# Patient Record
Sex: MALE | Race: BLACK OR AFRICAN AMERICAN | NOT HISPANIC OR LATINO | Employment: OTHER | URBAN - METROPOLITAN AREA
[De-identification: names, ages, dates, MRNs, and addresses within clinical notes are randomized per-mention and may not be internally consistent; named-entity substitution may affect disease eponyms.]

---

## 2024-03-26 ENCOUNTER — HOSPITAL ENCOUNTER (INPATIENT)
Facility: HOSPITAL | Age: 74
LOS: 35 days | Discharge: NURSING, REHAB OR LONG-TERM CARE | DRG: 955 | End: 2024-04-30
Attending: STUDENT IN AN ORGANIZED HEALTH CARE EDUCATION/TRAINING PROGRAM | Admitting: SURGERY
Payer: MEDICARE

## 2024-03-26 DIAGNOSIS — R60.9 SWELLING: ICD-10-CM

## 2024-03-26 DIAGNOSIS — S06.5XAA SUBDURAL HEMATOMA: ICD-10-CM

## 2024-03-26 DIAGNOSIS — S42.001A CLOSED DISPLACED FRACTURE OF RIGHT CLAVICLE, UNSPECIFIED PART OF CLAVICLE, INITIAL ENCOUNTER: ICD-10-CM

## 2024-03-26 DIAGNOSIS — S32.009A CLOSED FRACTURE OF TRANSVERSE PROCESS OF LUMBAR VERTEBRA, INITIAL ENCOUNTER: ICD-10-CM

## 2024-03-26 DIAGNOSIS — R00.0 TACHYCARDIA: ICD-10-CM

## 2024-03-26 DIAGNOSIS — E27.49 ADRENAL HEMORRHAGE: ICD-10-CM

## 2024-03-26 DIAGNOSIS — R79.89 ELEVATED TROPONIN: ICD-10-CM

## 2024-03-26 DIAGNOSIS — I62.00 SUBDURAL BLEEDING: ICD-10-CM

## 2024-03-26 DIAGNOSIS — S42.017A CLOSED NONDISPLACED FRACTURE OF STERNAL END OF RIGHT CLAVICLE, INITIAL ENCOUNTER: Primary | ICD-10-CM

## 2024-03-26 DIAGNOSIS — S22.41XA CLOSED FRACTURE OF MULTIPLE RIBS OF RIGHT SIDE, INITIAL ENCOUNTER: ICD-10-CM

## 2024-03-26 DIAGNOSIS — I10 HTN (HYPERTENSION): ICD-10-CM

## 2024-03-26 DIAGNOSIS — I60.9 SAH (SUBARACHNOID HEMORRHAGE): ICD-10-CM

## 2024-03-26 DIAGNOSIS — S06.5X0A TRAUMATIC SUBDURAL HEMATOMA WITHOUT LOSS OF CONSCIOUSNESS, INITIAL ENCOUNTER: ICD-10-CM

## 2024-03-26 DIAGNOSIS — S06.9XAA TBI (TRAUMATIC BRAIN INJURY): ICD-10-CM

## 2024-03-26 DIAGNOSIS — J93.9 PNEUMOTHORAX, UNSPECIFIED TYPE: ICD-10-CM

## 2024-03-26 LAB
ALBUMIN SERPL-MCNC: 3.9 G/DL (ref 3.4–4.8)
ALBUMIN/GLOB SERPL: 1.2 RATIO (ref 1.1–2)
ALP SERPL-CCNC: 65 UNIT/L (ref 40–150)
ALT SERPL-CCNC: 98 UNIT/L (ref 0–55)
AMPHET UR QL SCN: NEGATIVE
APPEARANCE UR: CLEAR
APTT PPP: 27.5 SECONDS (ref 23.2–33.7)
AST SERPL-CCNC: 163 UNIT/L (ref 5–34)
BACTERIA #/AREA URNS AUTO: ABNORMAL /HPF
BARBITURATE SCN PRESENT UR: NEGATIVE
BASOPHILS # BLD AUTO: 0.08 X10(3)/MCL
BASOPHILS NFR BLD AUTO: 0.5 %
BENZODIAZ UR QL SCN: NEGATIVE
BILIRUB SERPL-MCNC: 0.4 MG/DL
BILIRUB UR QL STRIP.AUTO: NEGATIVE
BUN SERPL-MCNC: 21.2 MG/DL (ref 8.4–25.7)
CALCIUM SERPL-MCNC: 9.2 MG/DL (ref 8.8–10)
CANNABINOIDS UR QL SCN: NEGATIVE
CHLORIDE SERPL-SCNC: 106 MMOL/L (ref 98–107)
CO2 SERPL-SCNC: 21 MMOL/L (ref 23–31)
COCAINE UR QL SCN: NEGATIVE
COLOR UR AUTO: ABNORMAL
CREAT SERPL-MCNC: 0.86 MG/DL (ref 0.73–1.18)
EOSINOPHIL # BLD AUTO: 0.05 X10(3)/MCL (ref 0–0.9)
EOSINOPHIL NFR BLD AUTO: 0.3 %
ERYTHROCYTE [DISTWIDTH] IN BLOOD BY AUTOMATED COUNT: 14.7 % (ref 11.5–17)
ETHANOL SERPL-MCNC: <10 MG/DL
FENTANYL UR QL SCN: POSITIVE
GFR SERPLBLD CREATININE-BSD FMLA CKD-EPI: >60 MLS/MIN/1.73/M2
GLOBULIN SER-MCNC: 3.3 GM/DL (ref 2.4–3.5)
GLUCOSE SERPL-MCNC: 160 MG/DL (ref 82–115)
GLUCOSE UR QL STRIP.AUTO: NORMAL
HCT VFR BLD AUTO: 39.1 % (ref 42–52)
HGB BLD-MCNC: 12.4 G/DL (ref 14–18)
IMM GRANULOCYTES # BLD AUTO: 0.08 X10(3)/MCL (ref 0–0.04)
IMM GRANULOCYTES NFR BLD AUTO: 0.5 %
INR PPP: 1.2
KETONES UR QL STRIP.AUTO: NEGATIVE
LACTATE SERPL-SCNC: 2.7 MMOL/L (ref 0.5–2.2)
LEUKOCYTE ESTERASE UR QL STRIP.AUTO: NEGATIVE
LYMPHOCYTES # BLD AUTO: 3.31 X10(3)/MCL (ref 0.6–4.6)
LYMPHOCYTES NFR BLD AUTO: 21.8 %
MCH RBC QN AUTO: 29.7 PG (ref 27–31)
MCHC RBC AUTO-ENTMCNC: 31.7 G/DL (ref 33–36)
MCV RBC AUTO: 93.8 FL (ref 80–94)
MDMA UR QL SCN: NEGATIVE
MONOCYTES # BLD AUTO: 1.08 X10(3)/MCL (ref 0.1–1.3)
MONOCYTES NFR BLD AUTO: 7.1 %
MUCOUS THREADS URNS QL MICRO: ABNORMAL /LPF
NEUTROPHILS # BLD AUTO: 10.55 X10(3)/MCL (ref 2.1–9.2)
NEUTROPHILS NFR BLD AUTO: 69.8 %
NITRITE UR QL STRIP.AUTO: NEGATIVE
NRBC BLD AUTO-RTO: 0 %
OPIATES UR QL SCN: POSITIVE
PCP UR QL: NEGATIVE
PH UR STRIP.AUTO: 5.5 [PH]
PH UR: 5.5 [PH] (ref 3–11)
PLATELET # BLD AUTO: 236 X10(3)/MCL (ref 130–400)
PMV BLD AUTO: 9.4 FL (ref 7.4–10.4)
POTASSIUM SERPL-SCNC: 3.3 MMOL/L (ref 3.5–5.1)
PROT SERPL-MCNC: 7.2 GM/DL (ref 5.8–7.6)
PROT UR QL STRIP.AUTO: ABNORMAL
PROTHROMBIN TIME: 15.5 SECONDS (ref 12.5–14.5)
RBC # BLD AUTO: 4.17 X10(6)/MCL (ref 4.7–6.1)
RBC #/AREA URNS AUTO: ABNORMAL /HPF
RBC UR QL AUTO: ABNORMAL
SODIUM SERPL-SCNC: 139 MMOL/L (ref 136–145)
SP GR UR STRIP.AUTO: 1.03 (ref 1–1.03)
SPECIFIC GRAVITY, URINE AUTO (.000) (OHS): 1.03 (ref 1–1.03)
SQUAMOUS #/AREA URNS LPF: ABNORMAL /HPF
UROBILINOGEN UR STRIP-ACNC: NORMAL
WBC # SPEC AUTO: 15.15 X10(3)/MCL (ref 4.5–11.5)
WBC #/AREA URNS AUTO: ABNORMAL /HPF

## 2024-03-26 PROCEDURE — 63600175 PHARM REV CODE 636 W HCPCS: Performed by: SURGERY

## 2024-03-26 PROCEDURE — 63600175 PHARM REV CODE 636 W HCPCS

## 2024-03-26 PROCEDURE — 80053 COMPREHEN METABOLIC PANEL: CPT | Performed by: SURGERY

## 2024-03-26 PROCEDURE — 25000003 PHARM REV CODE 250: Performed by: SURGERY

## 2024-03-26 PROCEDURE — 0HQ0XZZ REPAIR SCALP SKIN, EXTERNAL APPROACH: ICD-10-PCS | Performed by: SURGERY

## 2024-03-26 PROCEDURE — G0390 TRAUMA RESPONS W/HOSP CRITI: HCPCS

## 2024-03-26 PROCEDURE — 25500020 PHARM REV CODE 255: Performed by: SURGERY

## 2024-03-26 PROCEDURE — 85610 PROTHROMBIN TIME: CPT | Performed by: SURGERY

## 2024-03-26 PROCEDURE — 25000003 PHARM REV CODE 250: Performed by: NURSE PRACTITIONER

## 2024-03-26 PROCEDURE — 96375 TX/PRO/DX INJ NEW DRUG ADDON: CPT

## 2024-03-26 PROCEDURE — 82077 ASSAY SPEC XCP UR&BREATH IA: CPT | Performed by: SURGERY

## 2024-03-26 PROCEDURE — 81001 URINALYSIS AUTO W/SCOPE: CPT | Mod: XB | Performed by: SURGERY

## 2024-03-26 PROCEDURE — 3E0234Z INTRODUCTION OF SERUM, TOXOID AND VACCINE INTO MUSCLE, PERCUTANEOUS APPROACH: ICD-10-PCS | Performed by: SURGERY

## 2024-03-26 PROCEDURE — 51702 INSERT TEMP BLADDER CATH: CPT

## 2024-03-26 PROCEDURE — 90715 TDAP VACCINE 7 YRS/> IM: CPT | Performed by: SURGERY

## 2024-03-26 PROCEDURE — 90471 IMMUNIZATION ADMIN: CPT | Performed by: SURGERY

## 2024-03-26 PROCEDURE — 85730 THROMBOPLASTIN TIME PARTIAL: CPT | Performed by: SURGERY

## 2024-03-26 PROCEDURE — 63600175 PHARM REV CODE 636 W HCPCS: Performed by: NURSE PRACTITIONER

## 2024-03-26 PROCEDURE — 80307 DRUG TEST PRSMV CHEM ANLYZR: CPT | Performed by: SURGERY

## 2024-03-26 PROCEDURE — 85025 COMPLETE CBC W/AUTO DIFF WBC: CPT | Performed by: SURGERY

## 2024-03-26 PROCEDURE — 93005 ELECTROCARDIOGRAM TRACING: CPT

## 2024-03-26 PROCEDURE — 20800000 HC ICU TRAUMA

## 2024-03-26 PROCEDURE — 86850 RBC ANTIBODY SCREEN: CPT | Performed by: SURGERY

## 2024-03-26 PROCEDURE — 83605 ASSAY OF LACTIC ACID: CPT | Performed by: SURGERY

## 2024-03-26 PROCEDURE — 99285 EMERGENCY DEPT VISIT HI MDM: CPT | Mod: 25

## 2024-03-26 PROCEDURE — 96374 THER/PROPH/DIAG INJ IV PUSH: CPT

## 2024-03-26 PROCEDURE — 25000003 PHARM REV CODE 250

## 2024-03-26 RX ORDER — BISACODYL 10 MG/1
10 SUPPOSITORY RECTAL DAILY PRN
Status: DISCONTINUED | OUTPATIENT
Start: 2024-03-26 | End: 2024-04-30 | Stop reason: HOSPADM

## 2024-03-26 RX ORDER — ACETAMINOPHEN 325 MG/1
650 TABLET ORAL EVERY 4 HOURS
Status: DISPENSED | OUTPATIENT
Start: 2024-03-26 | End: 2024-03-31

## 2024-03-26 RX ORDER — LABETALOL HYDROCHLORIDE 5 MG/ML
10 INJECTION, SOLUTION INTRAVENOUS EVERY 6 HOURS PRN
Status: DISCONTINUED | OUTPATIENT
Start: 2024-03-26 | End: 2024-04-30 | Stop reason: HOSPADM

## 2024-03-26 RX ORDER — POLYETHYLENE GLYCOL 3350 17 G/17G
17 POWDER, FOR SOLUTION ORAL 2 TIMES DAILY
Status: DISCONTINUED | OUTPATIENT
Start: 2024-03-26 | End: 2024-04-01

## 2024-03-26 RX ORDER — LEVETIRACETAM 10 MG/ML
INJECTION INTRAVASCULAR
Status: COMPLETED
Start: 2024-03-26 | End: 2024-03-26

## 2024-03-26 RX ORDER — LEVETIRACETAM 500 MG/1
500 TABLET ORAL 2 TIMES DAILY
Status: DISCONTINUED | OUTPATIENT
Start: 2024-03-27 | End: 2024-03-26

## 2024-03-26 RX ORDER — FAMOTIDINE 10 MG/ML
20 INJECTION INTRAVENOUS 2 TIMES DAILY
Status: DISCONTINUED | OUTPATIENT
Start: 2024-03-27 | End: 2024-04-03

## 2024-03-26 RX ORDER — CEFAZOLIN SODIUM 1 G/3ML
INJECTION, POWDER, FOR SOLUTION INTRAMUSCULAR; INTRAVENOUS
Status: COMPLETED
Start: 2024-03-26 | End: 2024-03-26

## 2024-03-26 RX ORDER — LEVETIRACETAM 10 MG/ML
1000 INJECTION INTRAVASCULAR ONCE
Status: COMPLETED | OUTPATIENT
Start: 2024-03-26 | End: 2024-03-26

## 2024-03-26 RX ORDER — DOCUSATE SODIUM 100 MG/1
100 CAPSULE, LIQUID FILLED ORAL 2 TIMES DAILY
Status: DISCONTINUED | OUTPATIENT
Start: 2024-03-26 | End: 2024-04-01

## 2024-03-26 RX ORDER — MORPHINE SULFATE 4 MG/ML
2 INJECTION, SOLUTION INTRAMUSCULAR; INTRAVENOUS
Status: ACTIVE | OUTPATIENT
Start: 2024-03-26 | End: 2024-03-29

## 2024-03-26 RX ORDER — METHOCARBAMOL 100 MG/ML
500 INJECTION, SOLUTION INTRAMUSCULAR; INTRAVENOUS EVERY 8 HOURS
Status: DISCONTINUED | OUTPATIENT
Start: 2024-03-27 | End: 2024-03-26

## 2024-03-26 RX ORDER — FAMOTIDINE 20 MG/1
20 TABLET, FILM COATED ORAL 2 TIMES DAILY
Status: DISCONTINUED | OUTPATIENT
Start: 2024-03-26 | End: 2024-03-26

## 2024-03-26 RX ORDER — HYDRALAZINE HYDROCHLORIDE 20 MG/ML
10 INJECTION INTRAMUSCULAR; INTRAVENOUS EVERY 6 HOURS PRN
Status: DISCONTINUED | OUTPATIENT
Start: 2024-03-26 | End: 2024-04-30 | Stop reason: HOSPADM

## 2024-03-26 RX ORDER — ONDANSETRON HYDROCHLORIDE 2 MG/ML
INJECTION, SOLUTION INTRAVENOUS CODE/TRAUMA/SEDATION MEDICATION
Status: COMPLETED | OUTPATIENT
Start: 2024-03-26 | End: 2024-03-26

## 2024-03-26 RX ORDER — ONDANSETRON HYDROCHLORIDE 2 MG/ML
INJECTION, SOLUTION INTRAVENOUS
Status: DISPENSED
Start: 2024-03-26 | End: 2024-03-27

## 2024-03-26 RX ORDER — METHOCARBAMOL 500 MG/1
500 TABLET, FILM COATED ORAL EVERY 8 HOURS
Status: DISCONTINUED | OUTPATIENT
Start: 2024-03-26 | End: 2024-03-26

## 2024-03-26 RX ORDER — OXYCODONE HYDROCHLORIDE 5 MG/1
5 TABLET ORAL EVERY 4 HOURS PRN
Status: DISCONTINUED | OUTPATIENT
Start: 2024-03-26 | End: 2024-03-28

## 2024-03-26 RX ORDER — METHOCARBAMOL 100 MG/ML
500 INJECTION, SOLUTION INTRAMUSCULAR; INTRAVENOUS EVERY 8 HOURS
Status: COMPLETED | OUTPATIENT
Start: 2024-03-26 | End: 2024-03-29

## 2024-03-26 RX ORDER — TALC
6 POWDER (GRAM) TOPICAL NIGHTLY PRN
Status: DISCONTINUED | OUTPATIENT
Start: 2024-03-26 | End: 2024-04-01

## 2024-03-26 RX ORDER — MUPIROCIN 20 MG/G
OINTMENT TOPICAL 2 TIMES DAILY
Status: COMPLETED | OUTPATIENT
Start: 2024-03-26 | End: 2024-03-31

## 2024-03-26 RX ORDER — SODIUM CHLORIDE 9 MG/ML
INJECTION, SOLUTION INTRAVENOUS CONTINUOUS
Status: DISCONTINUED | OUTPATIENT
Start: 2024-03-26 | End: 2024-03-27

## 2024-03-26 RX ADMIN — IOPAMIDOL 100 ML: 755 INJECTION, SOLUTION INTRAVENOUS at 10:03

## 2024-03-26 RX ADMIN — LEVETIRACETAM INJECTION 1000 MG: 10 INJECTION INTRAVENOUS at 09:03

## 2024-03-26 RX ADMIN — SODIUM CHLORIDE: 9 INJECTION, SOLUTION INTRAVENOUS at 10:03

## 2024-03-26 RX ADMIN — ONDANSETRON 4 MG: 2 INJECTION INTRAMUSCULAR; INTRAVENOUS at 09:03

## 2024-03-26 RX ADMIN — METHOCARBAMOL 500 MG: 100 INJECTION INTRAMUSCULAR; INTRAVENOUS at 11:03

## 2024-03-26 RX ADMIN — MUPIROCIN: 20 OINTMENT TOPICAL at 10:03

## 2024-03-26 RX ADMIN — TETANUS TOXOID, REDUCED DIPHTHERIA TOXOID AND ACELLULAR PERTUSSIS VACCINE, ADSORBED 0.5 ML: 5; 2.5; 8; 8; 2.5 SUSPENSION INTRAMUSCULAR at 09:03

## 2024-03-26 RX ADMIN — CEFAZOLIN 2 G: 330 INJECTION, POWDER, FOR SOLUTION INTRAMUSCULAR; INTRAVENOUS at 09:03

## 2024-03-26 RX ADMIN — LEVETIRACETAM 1000 MG: 10 INJECTION INTRAVASCULAR at 09:03

## 2024-03-26 RX ADMIN — OXYCODONE HYDROCHLORIDE 5 MG: 5 TABLET ORAL at 11:03

## 2024-03-26 NOTE — LETTER
__________ accompanied their Brother/ Father/ Grandfather to the emergency department on 3/26/2024. They may return to work on 3/29/2024.    If you have any questions or concerns, please don't hesitate to call.    Trauma ICU RN  [phone]

## 2024-03-26 NOTE — LETTER
March 29, 2024                 [facility] [address] [place] [address]  Phone: [phone] March 29, 2024     Patient: Anayeli Taylor   YOB: 1950       To Whom It May Concern:    Anayeli Taylor was admitted to the hospital on 3/26/2024  9:14 PM and is currently still a patient as of 3/29/2024.  If you have any questions or concerns, please don't hesitate to call at [phone].      Sincerely,        Indira Beauchamp, RN  [facility]   ICU

## 2024-03-26 NOTE — LETTER
March 29, 2024                 [facility] [address] [place] [address]  Phone: [phone]   March 29, 2024     Patient: Anayeli Taylor   YOB: 1950       To Whom It May Concern:    Anayeli Taylor was admitted to the hospital on 3/26/2024  9:14 PM and is still currently a patient as of 3/29/2024.  If you have any questions or concerns, please don't hesitate to call at [phone].      Sincerely,        Indira Beauchamp, RN  [facility] [place] [facility]  ICU

## 2024-03-26 NOTE — Clinical Note
Diagnosis: TBI (traumatic brain injury) [996105]   Future Attending Provider: AURORA MARSH [[id number]]   Admit to which facility:: [facility] [[id number]]   Requested Bed Type: Bariatric [4]   Reason for IP Medical Treatment  (Clinical interventions that can only be accomplished in the IP setting? ) :: TBI   I certify that Inpatient services for greater than or equal to 2 midnights are medically necessary:: Yes   Plans for Post-Acute care--if anticipated (pick the single best option):: A. No post acute care anticipated at this time

## 2024-03-27 ENCOUNTER — ANESTHESIA EVENT (OUTPATIENT)
Dept: SURGERY | Facility: HOSPITAL | Age: 74
DRG: 955 | End: 2024-03-27
Payer: MEDICARE

## 2024-03-27 ENCOUNTER — ANESTHESIA (OUTPATIENT)
Dept: SURGERY | Facility: HOSPITAL | Age: 74
DRG: 955 | End: 2024-03-27
Payer: MEDICARE

## 2024-03-27 PROBLEM — S06.5XAA TRAUMATIC SUBDURAL HEMATOMA (SDH): Status: ACTIVE | Noted: 2024-03-27

## 2024-03-27 PROBLEM — S42.017A CLOSED NONDISPLACED FRACTURE OF STERNAL END OF RIGHT CLAVICLE: Status: ACTIVE | Noted: 2024-03-27

## 2024-03-27 PROBLEM — S22.49XA CLOSED FRACTURE OF MULTIPLE RIBS: Status: ACTIVE | Noted: 2024-03-27

## 2024-03-27 PROBLEM — J96.01 ACUTE HYPOXEMIC RESPIRATORY FAILURE: Status: ACTIVE | Noted: 2024-03-27

## 2024-03-27 PROBLEM — J69.0 ASPIRATION PNEUMONIA DUE TO GASTRIC SECRETIONS: Status: ACTIVE | Noted: 2024-03-27

## 2024-03-27 LAB
ABO + RH BLD: NORMAL
ABO + RH BLD: NORMAL
ABORH RETYPE: NORMAL
ALBUMIN SERPL-MCNC: 3.5 G/DL (ref 3.4–4.8)
ALBUMIN SERPL-MCNC: 3.7 G/DL (ref 3.4–4.8)
ALBUMIN/GLOB SERPL: 1.3 RATIO (ref 1.1–2)
ALBUMIN/GLOB SERPL: 1.3 RATIO (ref 1.1–2)
ALLENS TEST BLOOD GAS (OHS): ABNORMAL
ALLENS TEST BLOOD GAS (OHS): YES
ALP SERPL-CCNC: 58 UNIT/L (ref 40–150)
ALP SERPL-CCNC: 61 UNIT/L (ref 40–150)
ALT SERPL-CCNC: 66 UNIT/L (ref 0–55)
ALT SERPL-CCNC: 90 UNIT/L (ref 0–55)
AST SERPL-CCNC: 135 UNIT/L (ref 5–34)
AST SERPL-CCNC: 99 UNIT/L (ref 5–34)
BASE EXCESS BLD CALC-SCNC: -0.8 MMOL/L (ref -2–2)
BASE EXCESS BLD CALC-SCNC: 2.2 MMOL/L (ref -2–2)
BASOPHILS # BLD AUTO: 0.01 X10(3)/MCL
BASOPHILS # BLD AUTO: 0.04 X10(3)/MCL
BASOPHILS NFR BLD AUTO: 0.1 %
BASOPHILS NFR BLD AUTO: 0.3 %
BILIRUB SERPL-MCNC: 0.4 MG/DL
BILIRUB SERPL-MCNC: 0.4 MG/DL
BLD PROD TYP BPU: NORMAL
BLD PROD TYP BPU: NORMAL
BLOOD GAS SAMPLE TYPE (OHS): ABNORMAL
BLOOD GAS SAMPLE TYPE (OHS): ABNORMAL
BLOOD UNIT EXPIRATION DATE: NORMAL
BLOOD UNIT EXPIRATION DATE: NORMAL
BLOOD UNIT TYPE CODE: 6200
BLOOD UNIT TYPE CODE: 6200
BUN SERPL-MCNC: 19.2 MG/DL (ref 8.4–25.7)
BUN SERPL-MCNC: 20.4 MG/DL (ref 8.4–25.7)
CA-I BLD-SCNC: 1 MMOL/L (ref 1.12–1.23)
CA-I BLD-SCNC: 1.09 MMOL/L (ref 1.12–1.23)
CALCIUM SERPL-MCNC: 8.6 MG/DL (ref 8.8–10)
CALCIUM SERPL-MCNC: 8.9 MG/DL (ref 8.8–10)
CHLORIDE SERPL-SCNC: 104 MMOL/L (ref 98–107)
CHLORIDE SERPL-SCNC: 109 MMOL/L (ref 98–107)
CO2 BLDA-SCNC: 25.6 MMOL/L
CO2 BLDA-SCNC: 26.3 MMOL/L
CO2 SERPL-SCNC: 20 MMOL/L (ref 23–31)
CO2 SERPL-SCNC: 23 MMOL/L (ref 23–31)
COHGB MFR BLDA: 1.1 % (ref 0.5–1.5)
CREAT SERPL-MCNC: 0.84 MG/DL (ref 0.73–1.18)
CREAT SERPL-MCNC: 0.87 MG/DL (ref 0.73–1.18)
CROSSMATCH INTERPRETATION: NORMAL
CROSSMATCH INTERPRETATION: NORMAL
DISPENSE STATUS: NORMAL
DISPENSE STATUS: NORMAL
DRAWN BY BLOOD GAS (OHS): ABNORMAL
DRAWN BY BLOOD GAS (OHS): ABNORMAL
EOSINOPHIL # BLD AUTO: 0 X10(3)/MCL (ref 0–0.9)
EOSINOPHIL # BLD AUTO: 0.01 X10(3)/MCL (ref 0–0.9)
EOSINOPHIL NFR BLD AUTO: 0 %
EOSINOPHIL NFR BLD AUTO: 0.1 %
ERYTHROCYTE [DISTWIDTH] IN BLOOD BY AUTOMATED COUNT: 14.6 % (ref 11.5–17)
ERYTHROCYTE [DISTWIDTH] IN BLOOD BY AUTOMATED COUNT: 14.7 % (ref 11.5–17)
GFR SERPLBLD CREATININE-BSD FMLA CKD-EPI: >60 MLS/MIN/1.73/M2
GFR SERPLBLD CREATININE-BSD FMLA CKD-EPI: >60 MLS/MIN/1.73/M2
GLOBULIN SER-MCNC: 2.7 GM/DL (ref 2.4–3.5)
GLOBULIN SER-MCNC: 2.9 GM/DL (ref 2.4–3.5)
GLUCOSE SERPL-MCNC: 166 MG/DL (ref 82–115)
GLUCOSE SERPL-MCNC: 212 MG/DL (ref 82–115)
GROUP & RH: NORMAL
HCO3 BLDA-SCNC: 24.3 MMOL/L (ref 22–26)
HCO3 BLDA-SCNC: 25.3 MMOL/L (ref 22–26)
HCT VFR BLD AUTO: 29.1 % (ref 42–52)
HCT VFR BLD AUTO: 34.9 % (ref 42–52)
HGB BLD-MCNC: 11.7 G/DL (ref 14–18)
HGB BLD-MCNC: 9.4 G/DL (ref 14–18)
IMM GRANULOCYTES # BLD AUTO: 0.07 X10(3)/MCL (ref 0–0.04)
IMM GRANULOCYTES # BLD AUTO: 0.09 X10(3)/MCL (ref 0–0.04)
IMM GRANULOCYTES NFR BLD AUTO: 0.4 %
IMM GRANULOCYTES NFR BLD AUTO: 0.6 %
INDIRECT COOMBS: NORMAL
INHALED O2 CONCENTRATION: 50 %
INHALED O2 CONCENTRATION: 80 %
INR PPP: 1.2
ITIME (SEC) (OHS): 0.7 SEC
LACTATE SERPL-SCNC: 1.3 MMOL/L (ref 0.5–2.2)
LACTATE SERPL-SCNC: 2.1 MMOL/L (ref 0.5–2.2)
LACTATE SERPL-SCNC: 2.5 MMOL/L (ref 0.5–2.2)
LACTATE SERPL-SCNC: 3.1 MMOL/L (ref 0.5–2.2)
LYMPHOCYTES # BLD AUTO: 1.04 X10(3)/MCL (ref 0.6–4.6)
LYMPHOCYTES # BLD AUTO: 1.08 X10(3)/MCL (ref 0.6–4.6)
LYMPHOCYTES NFR BLD AUTO: 6.2 %
LYMPHOCYTES NFR BLD AUTO: 6.8 %
MCH RBC QN AUTO: 29.7 PG (ref 27–31)
MCH RBC QN AUTO: 30.5 PG (ref 27–31)
MCHC RBC AUTO-ENTMCNC: 32.3 G/DL (ref 33–36)
MCHC RBC AUTO-ENTMCNC: 33.5 G/DL (ref 33–36)
MCV RBC AUTO: 91.1 FL (ref 80–94)
MCV RBC AUTO: 92.1 FL (ref 80–94)
MECH RR (OHS): 24 B/MIN
MECH RR (OHS): 26 B/MIN
METHGB MFR BLDA: 1.3 % (ref 0.4–1.5)
MODE (OHS): AC
MODE (OHS): AC
MONOCYTES # BLD AUTO: 0.91 X10(3)/MCL (ref 0.1–1.3)
MONOCYTES # BLD AUTO: 1.19 X10(3)/MCL (ref 0.1–1.3)
MONOCYTES NFR BLD AUTO: 5.8 %
MONOCYTES NFR BLD AUTO: 7.1 %
NEUTROPHILS # BLD AUTO: 13.7 X10(3)/MCL (ref 2.1–9.2)
NEUTROPHILS # BLD AUTO: 14.38 X10(3)/MCL (ref 2.1–9.2)
NEUTROPHILS NFR BLD AUTO: 86.1 %
NEUTROPHILS NFR BLD AUTO: 86.5 %
NRBC BLD AUTO-RTO: 0 %
NRBC BLD AUTO-RTO: 0 %
O2 HB BLOOD GAS (OHS): 97.4 % (ref 94–97)
OHS QRS DURATION: 88 MS
OHS QTC CALCULATION: 462 MS
OXYGEN DEVICE BLOOD GAS (OHS): ABNORMAL
OXYGEN DEVICE BLOOD GAS (OHS): ABNORMAL
OXYHGB MFR BLDA: 9.9 G/DL (ref 12–16)
PCO2 BLDA: 34 MMHG (ref 35–45)
PCO2 BLDA: 41 MMHG (ref 35–45)
PEEP RESPIRATORY: 5 CMH2O
PEEP RESPIRATORY: 6 CMH2O
PH BLDA: 7.38 [PH] (ref 7.35–7.45)
PH BLDA: 7.48 [PH] (ref 7.35–7.45)
PLATELET # BLD AUTO: 210 X10(3)/MCL (ref 130–400)
PLATELET # BLD AUTO: 244 X10(3)/MCL (ref 130–400)
PMV BLD AUTO: 9.3 FL (ref 7.4–10.4)
PMV BLD AUTO: 9.6 FL (ref 7.4–10.4)
PO2 BLDA: 214 MMHG (ref 80–100)
PO2 BLDA: 284 MMHG (ref 80–100)
POTASSIUM BLOOD GAS (OHS): 3.1 MMOL/L (ref 3.5–5)
POTASSIUM BLOOD GAS (OHS): 3.6 MMOL/L (ref 3.5–5)
POTASSIUM SERPL-SCNC: 3.2 MMOL/L (ref 3.5–5.1)
POTASSIUM SERPL-SCNC: 3.6 MMOL/L (ref 3.5–5.1)
PROT SERPL-MCNC: 6.2 GM/DL (ref 5.8–7.6)
PROT SERPL-MCNC: 6.6 GM/DL (ref 5.8–7.6)
PROTHROMBIN TIME: 15.4 SECONDS (ref 12.5–14.5)
RBC # BLD AUTO: 3.16 X10(6)/MCL (ref 4.7–6.1)
RBC # BLD AUTO: 3.83 X10(6)/MCL (ref 4.7–6.1)
SAMPLE SITE BLOOD GAS (OHS): ABNORMAL
SAMPLE SITE BLOOD GAS (OHS): ABNORMAL
SAO2 % BLDA: 99.8 %
SAO2 % BLDA: 99.9 %
SODIUM BLOOD GAS (OHS): 133 MMOL/L (ref 137–145)
SODIUM BLOOD GAS (OHS): 141 MMOL/L (ref 137–145)
SODIUM SERPL-SCNC: 138 MMOL/L (ref 136–145)
SODIUM SERPL-SCNC: 140 MMOL/L (ref 136–145)
SODIUM SERPL-SCNC: 140 MMOL/L (ref 136–145)
SODIUM SERPL-SCNC: 145 MMOL/L (ref 136–145)
SODIUM SERPL-SCNC: 147 MMOL/L (ref 136–145)
SPECIMEN OUTDATE: NORMAL
SPONT+MECH VT ON VENT: 450 ML
SPONT+MECH VT ON VENT: 450 ML
UNIT NUMBER: NORMAL
UNIT NUMBER: NORMAL
WBC # SPEC AUTO: 15.82 X10(3)/MCL (ref 4.5–11.5)
WBC # SPEC AUTO: 16.7 X10(3)/MCL (ref 4.5–11.5)

## 2024-03-27 PROCEDURE — D9220A PRA ANESTHESIA: Mod: CRNA,,,

## 2024-03-27 PROCEDURE — 99900035 HC TECH TIME PER 15 MIN (STAT)

## 2024-03-27 PROCEDURE — 61312 CRNEC/CRNOT STTL XDRL/SDRL: CPT | Mod: ,,, | Performed by: STUDENT IN AN ORGANIZED HEALTH CARE EDUCATION/TRAINING PROGRAM

## 2024-03-27 PROCEDURE — 80053 COMPREHEN METABOLIC PANEL: CPT | Performed by: NURSE PRACTITIONER

## 2024-03-27 PROCEDURE — 00C40ZZ EXTIRPATION OF MATTER FROM INTRACRANIAL SUBDURAL SPACE, OPEN APPROACH: ICD-10-PCS | Performed by: STUDENT IN AN ORGANIZED HEALTH CARE EDUCATION/TRAINING PROGRAM

## 2024-03-27 PROCEDURE — 37000008 HC ANESTHESIA 1ST 15 MINUTES: Performed by: STUDENT IN AN ORGANIZED HEALTH CARE EDUCATION/TRAINING PROGRAM

## 2024-03-27 PROCEDURE — 25000003 PHARM REV CODE 250: Performed by: STUDENT IN AN ORGANIZED HEALTH CARE EDUCATION/TRAINING PROGRAM

## 2024-03-27 PROCEDURE — 85610 PROTHROMBIN TIME: CPT | Performed by: NURSE PRACTITIONER

## 2024-03-27 PROCEDURE — 25000003 PHARM REV CODE 250: Performed by: SURGERY

## 2024-03-27 PROCEDURE — 83605 ASSAY OF LACTIC ACID: CPT | Performed by: SURGERY

## 2024-03-27 PROCEDURE — 85025 COMPLETE CBC W/AUTO DIFF WBC: CPT | Performed by: NURSE PRACTITIONER

## 2024-03-27 PROCEDURE — 37000009 HC ANESTHESIA EA ADD 15 MINS: Performed by: STUDENT IN AN ORGANIZED HEALTH CARE EDUCATION/TRAINING PROGRAM

## 2024-03-27 PROCEDURE — 36000711: Performed by: STUDENT IN AN ORGANIZED HEALTH CARE EDUCATION/TRAINING PROGRAM

## 2024-03-27 PROCEDURE — 36430 TRANSFUSION BLD/BLD COMPNT: CPT

## 2024-03-27 PROCEDURE — 63600175 PHARM REV CODE 636 W HCPCS

## 2024-03-27 PROCEDURE — 27100171 HC OXYGEN HIGH FLOW UP TO 24 HOURS

## 2024-03-27 PROCEDURE — 63600175 PHARM REV CODE 636 W HCPCS: Performed by: NURSE PRACTITIONER

## 2024-03-27 PROCEDURE — D9220A PRA ANESTHESIA: Mod: ANES,,, | Performed by: ANESTHESIOLOGY

## 2024-03-27 PROCEDURE — 36556 INSERT NON-TUNNEL CV CATH: CPT | Mod: 59,,, | Performed by: ANESTHESIOLOGY

## 2024-03-27 PROCEDURE — 63600175 PHARM REV CODE 636 W HCPCS: Performed by: SURGERY

## 2024-03-27 PROCEDURE — 99223 1ST HOSP IP/OBS HIGH 75: CPT | Mod: ,,, | Performed by: STUDENT IN AN ORGANIZED HEALTH CARE EDUCATION/TRAINING PROGRAM

## 2024-03-27 PROCEDURE — 83605 ASSAY OF LACTIC ACID: CPT | Performed by: NURSE PRACTITIONER

## 2024-03-27 PROCEDURE — 20800000 HC ICU TRAUMA

## 2024-03-27 PROCEDURE — C1713 ANCHOR/SCREW BN/BN,TIS/BN: HCPCS | Performed by: STUDENT IN AN ORGANIZED HEALTH CARE EDUCATION/TRAINING PROGRAM

## 2024-03-27 PROCEDURE — 76937 US GUIDE VASCULAR ACCESS: CPT | Mod: 26,,, | Performed by: ANESTHESIOLOGY

## 2024-03-27 PROCEDURE — 94003 VENT MGMT INPAT SUBQ DAY: CPT

## 2024-03-27 PROCEDURE — 30233R1 TRANSFUSION OF NONAUTOLOGOUS PLATELETS INTO PERIPHERAL VEIN, PERCUTANEOUS APPROACH: ICD-10-PCS | Performed by: SURGERY

## 2024-03-27 PROCEDURE — 25500020 PHARM REV CODE 255: Performed by: NURSE PRACTITIONER

## 2024-03-27 PROCEDURE — 99223 1ST HOSP IP/OBS HIGH 75: CPT | Mod: ,,, | Performed by: NURSE PRACTITIONER

## 2024-03-27 PROCEDURE — 02HV33Z INSERTION OF INFUSION DEVICE INTO SUPERIOR VENA CAVA, PERCUTANEOUS APPROACH: ICD-10-PCS | Performed by: STUDENT IN AN ORGANIZED HEALTH CARE EDUCATION/TRAINING PROGRAM

## 2024-03-27 PROCEDURE — 36600 WITHDRAWAL OF ARTERIAL BLOOD: CPT

## 2024-03-27 PROCEDURE — 25000003 PHARM REV CODE 250

## 2024-03-27 PROCEDURE — 84295 ASSAY OF SERUM SODIUM: CPT | Performed by: NURSE PRACTITIONER

## 2024-03-27 PROCEDURE — 36000710: Performed by: STUDENT IN AN ORGANIZED HEALTH CARE EDUCATION/TRAINING PROGRAM

## 2024-03-27 PROCEDURE — 63600175 PHARM REV CODE 636 W HCPCS: Performed by: STUDENT IN AN ORGANIZED HEALTH CARE EDUCATION/TRAINING PROGRAM

## 2024-03-27 PROCEDURE — C1762 CONN TISS, HUMAN(INC FASCIA): HCPCS | Performed by: STUDENT IN AN ORGANIZED HEALTH CARE EDUCATION/TRAINING PROGRAM

## 2024-03-27 PROCEDURE — 36620 INSERTION CATHETER ARTERY: CPT | Mod: 59,,, | Performed by: ANESTHESIOLOGY

## 2024-03-27 PROCEDURE — 99900026 HC AIRWAY MAINTENANCE (STAT)

## 2024-03-27 PROCEDURE — 82803 BLOOD GASES ANY COMBINATION: CPT

## 2024-03-27 PROCEDURE — 99900031 HC PATIENT EDUCATION (STAT)

## 2024-03-27 PROCEDURE — P9035 PLATELET PHERES LEUKOREDUCED: HCPCS | Performed by: NURSE PRACTITIONER

## 2024-03-27 PROCEDURE — 00U20KZ SUPPLEMENT DURA MATER WITH NONAUTOLOGOUS TISSUE SUBSTITUTE, OPEN APPROACH: ICD-10-PCS | Performed by: STUDENT IN AN ORGANIZED HEALTH CARE EDUCATION/TRAINING PROGRAM

## 2024-03-27 PROCEDURE — 94760 N-INVAS EAR/PLS OXIMETRY 1: CPT | Mod: XB

## 2024-03-27 PROCEDURE — 27200966 HC CLOSED SUCTION SYSTEM

## 2024-03-27 PROCEDURE — 37799 UNLISTED PX VASCULAR SURGERY: CPT

## 2024-03-27 PROCEDURE — 25000003 PHARM REV CODE 250: Performed by: NURSE PRACTITIONER

## 2024-03-27 PROCEDURE — A4216 STERILE WATER/SALINE, 10 ML: HCPCS | Performed by: NURSE PRACTITIONER

## 2024-03-27 PROCEDURE — 27201423 OPTIME MED/SURG SUP & DEVICES STERILE SUPPLY: Performed by: STUDENT IN AN ORGANIZED HEALTH CARE EDUCATION/TRAINING PROGRAM

## 2024-03-27 PROCEDURE — 5A1945Z RESPIRATORY VENTILATION, 24-96 CONSECUTIVE HOURS: ICD-10-PCS | Performed by: SURGERY

## 2024-03-27 PROCEDURE — 0BH17EZ INSERTION OF ENDOTRACHEAL AIRWAY INTO TRACHEA, VIA NATURAL OR ARTIFICIAL OPENING: ICD-10-PCS | Performed by: SURGERY

## 2024-03-27 DEVICE — SCREW SD 1.5X4MM TI CROSS PIN: Type: IMPLANTABLE DEVICE | Site: PARIETAL | Status: FUNCTIONAL

## 2024-03-27 DEVICE — COVER UN3 BURRHOLE 14MM TAB: Type: IMPLANTABLE DEVICE | Site: PARIETAL | Status: FUNCTIONAL

## 2024-03-27 DEVICE — COLLAGEN DURAL REGENERATION MEMBRANE 5IN X 7IN (12.5CM X 17.5CM)
Type: IMPLANTABLE DEVICE | Site: PARIETAL | Status: FUNCTIONAL
Brand: DURAMATRIX-ONLAY PLUS

## 2024-03-27 RX ORDER — CEFAZOLIN SODIUM 1 G/3ML
INJECTION, POWDER, FOR SOLUTION INTRAMUSCULAR; INTRAVENOUS
Status: DISCONTINUED | OUTPATIENT
Start: 2024-03-27 | End: 2024-03-27 | Stop reason: HOSPADM

## 2024-03-27 RX ORDER — CEFAZOLIN SODIUM 2 G/50ML
2 SOLUTION INTRAVENOUS
Status: DISCONTINUED | OUTPATIENT
Start: 2024-03-27 | End: 2024-03-27

## 2024-03-27 RX ORDER — PROPOFOL 10 MG/ML
0-50 INJECTION, EMULSION INTRAVENOUS CONTINUOUS
Status: DISCONTINUED | OUTPATIENT
Start: 2024-03-27 | End: 2024-03-27

## 2024-03-27 RX ORDER — 3% SODIUM CHLORIDE 3 G/100ML
50 INJECTION, SOLUTION INTRAVENOUS CONTINUOUS
Status: DISCONTINUED | OUTPATIENT
Start: 2024-03-27 | End: 2024-03-28

## 2024-03-27 RX ORDER — BUPIVACAINE HYDROCHLORIDE AND EPINEPHRINE 2.5; 5 MG/ML; UG/ML
INJECTION, SOLUTION EPIDURAL; INFILTRATION; INTRACAUDAL; PERINEURAL
Status: DISCONTINUED | OUTPATIENT
Start: 2024-03-27 | End: 2024-03-27 | Stop reason: HOSPADM

## 2024-03-27 RX ORDER — HYDROCODONE BITARTRATE AND ACETAMINOPHEN 7.5; 325 MG/1; MG/1
1 TABLET ORAL EVERY 6 HOURS PRN
Status: ON HOLD | COMMUNITY
End: 2024-04-30

## 2024-03-27 RX ORDER — LIDOCAINE HYDROCHLORIDE 20 MG/ML
INJECTION, SOLUTION EPIDURAL; INFILTRATION; INTRACAUDAL; PERINEURAL
Status: DISCONTINUED | OUTPATIENT
Start: 2024-03-27 | End: 2024-03-27

## 2024-03-27 RX ORDER — HYDROCODONE BITARTRATE AND ACETAMINOPHEN 500; 5 MG/1; MG/1
TABLET ORAL
Status: DISCONTINUED | OUTPATIENT
Start: 2024-03-27 | End: 2024-03-30

## 2024-03-27 RX ORDER — PROPOFOL 10 MG/ML
VIAL (ML) INTRAVENOUS
Status: DISCONTINUED | OUTPATIENT
Start: 2024-03-27 | End: 2024-03-27

## 2024-03-27 RX ORDER — MANNITOL 250 MG/ML
25 INJECTION, SOLUTION INTRAVENOUS ONCE
Status: COMPLETED | OUTPATIENT
Start: 2024-03-27 | End: 2024-03-27

## 2024-03-27 RX ORDER — ONDANSETRON HYDROCHLORIDE 2 MG/ML
4 INJECTION, SOLUTION INTRAVENOUS EVERY 6 HOURS PRN
Status: DISCONTINUED | OUTPATIENT
Start: 2024-03-27 | End: 2024-04-30 | Stop reason: HOSPADM

## 2024-03-27 RX ORDER — FENTANYL CITRATE 50 UG/ML
50 INJECTION, SOLUTION INTRAMUSCULAR; INTRAVENOUS
Status: DISCONTINUED | OUTPATIENT
Start: 2024-03-27 | End: 2024-03-27

## 2024-03-27 RX ORDER — SENNOSIDES 8.6 MG/1
8.6 TABLET ORAL DAILY
Status: DISCONTINUED | OUTPATIENT
Start: 2024-03-27 | End: 2024-04-01

## 2024-03-27 RX ORDER — SODIUM CHLORIDE 0.9 % (FLUSH) 0.9 %
10 SYRINGE (ML) INJECTION EVERY 6 HOURS
Status: DISCONTINUED | OUTPATIENT
Start: 2024-03-27 | End: 2024-03-29

## 2024-03-27 RX ORDER — ONDANSETRON HYDROCHLORIDE 2 MG/ML
INJECTION, SOLUTION INTRAVENOUS
Status: DISCONTINUED | OUTPATIENT
Start: 2024-03-27 | End: 2024-03-27

## 2024-03-27 RX ORDER — ROCURONIUM BROMIDE 10 MG/ML
INJECTION, SOLUTION INTRAVENOUS
Status: DISCONTINUED | OUTPATIENT
Start: 2024-03-27 | End: 2024-03-27

## 2024-03-27 RX ORDER — PROPOFOL 10 MG/ML
INJECTION, EMULSION INTRAVENOUS
Status: COMPLETED
Start: 2024-03-27 | End: 2024-03-27

## 2024-03-27 RX ORDER — BACITRACIN 500 [USP'U]/G
OINTMENT TOPICAL 3 TIMES DAILY
Status: DISCONTINUED | OUTPATIENT
Start: 2024-03-27 | End: 2024-04-17

## 2024-03-27 RX ORDER — SODIUM CHLORIDE 0.9 % (FLUSH) 0.9 %
10 SYRINGE (ML) INJECTION
Status: DISCONTINUED | OUTPATIENT
Start: 2024-03-27 | End: 2024-03-29

## 2024-03-27 RX ORDER — BACITRACIN 500 [USP'U]/G
OINTMENT TOPICAL
Status: DISCONTINUED | OUTPATIENT
Start: 2024-03-27 | End: 2024-03-27 | Stop reason: HOSPADM

## 2024-03-27 RX ORDER — LIDOCAINE 50 MG/G
1 PATCH TOPICAL
Status: DISCONTINUED | OUTPATIENT
Start: 2024-03-27 | End: 2024-03-29

## 2024-03-27 RX ORDER — ETOMIDATE 2 MG/ML
INJECTION INTRAVENOUS CODE/TRAUMA/SEDATION MEDICATION
Status: DISCONTINUED | OUTPATIENT
Start: 2024-03-27 | End: 2024-03-31

## 2024-03-27 RX ORDER — FENTANYL CITRATE 50 UG/ML
INJECTION, SOLUTION INTRAMUSCULAR; INTRAVENOUS CODE/TRAUMA/SEDATION MEDICATION
Status: DISCONTINUED | OUTPATIENT
Start: 2024-03-27 | End: 2024-03-31

## 2024-03-27 RX ORDER — FENTANYL CITRATE 50 UG/ML
50 INJECTION, SOLUTION INTRAMUSCULAR; INTRAVENOUS
Status: DISCONTINUED | OUTPATIENT
Start: 2024-03-27 | End: 2024-03-29

## 2024-03-27 RX ORDER — FENTANYL CITRATE-0.9 % NACL/PF 10 MCG/ML
0-250 PLASTIC BAG, INJECTION (ML) INTRAVENOUS CONTINUOUS
Status: DISCONTINUED | OUTPATIENT
Start: 2024-03-27 | End: 2024-03-29

## 2024-03-27 RX ORDER — SILDENAFIL 50 MG/1
50 TABLET, FILM COATED ORAL DAILY PRN
Status: ON HOLD | COMMUNITY
End: 2024-04-30

## 2024-03-27 RX ORDER — PHENYLEPHRINE HCL IN 0.9% NACL 1 MG/10 ML
SYRINGE (ML) INTRAVENOUS
Status: DISCONTINUED | OUTPATIENT
Start: 2024-03-27 | End: 2024-03-27

## 2024-03-27 RX ORDER — CEFAZOLIN SODIUM 2 G/50ML
2 SOLUTION INTRAVENOUS
Status: COMPLETED | OUTPATIENT
Start: 2024-03-27 | End: 2024-03-28

## 2024-03-27 RX ORDER — LEVETIRACETAM 10 MG/ML
1000 INJECTION INTRAVASCULAR ONCE
Status: COMPLETED | OUTPATIENT
Start: 2024-03-27 | End: 2024-03-27

## 2024-03-27 RX ORDER — DEXAMETHASONE SODIUM PHOSPHATE 4 MG/ML
INJECTION, SOLUTION INTRA-ARTICULAR; INTRALESIONAL; INTRAMUSCULAR; INTRAVENOUS; SOFT TISSUE
Status: DISCONTINUED | OUTPATIENT
Start: 2024-03-27 | End: 2024-03-27

## 2024-03-27 RX ORDER — CEFAZOLIN SODIUM 1 G/3ML
INJECTION, POWDER, FOR SOLUTION INTRAMUSCULAR; INTRAVENOUS
Status: DISCONTINUED | OUTPATIENT
Start: 2024-03-27 | End: 2024-03-27

## 2024-03-27 RX ORDER — HYDROMORPHONE HYDROCHLORIDE 2 MG/ML
INJECTION, SOLUTION INTRAMUSCULAR; INTRAVENOUS; SUBCUTANEOUS
Status: DISCONTINUED | OUTPATIENT
Start: 2024-03-27 | End: 2024-03-27

## 2024-03-27 RX ORDER — DEXMEDETOMIDINE HYDROCHLORIDE 4 UG/ML
0-1.4 INJECTION, SOLUTION INTRAVENOUS CONTINUOUS
Status: DISCONTINUED | OUTPATIENT
Start: 2024-03-27 | End: 2024-04-02

## 2024-03-27 RX ORDER — OLMESARTAN MEDOXOMIL AND HYDROCHLOROTHIAZIDE 20/12.5 20; 12.5 MG/1; MG/1
1 TABLET ORAL DAILY
Status: ON HOLD | COMMUNITY
End: 2024-04-30

## 2024-03-27 RX ORDER — SODIUM CHLORIDE 9 MG/ML
INJECTION, SOLUTION INTRAVENOUS CONTINUOUS
Status: DISCONTINUED | OUTPATIENT
Start: 2024-03-27 | End: 2024-03-29

## 2024-03-27 RX ORDER — FENTANYL CITRATE 50 UG/ML
INJECTION, SOLUTION INTRAMUSCULAR; INTRAVENOUS
Status: COMPLETED
Start: 2024-03-27 | End: 2024-03-27

## 2024-03-27 RX ADMIN — MANNITOL 25 G: 250 INJECTION, SOLUTION INTRAVENOUS at 01:03

## 2024-03-27 RX ADMIN — LEVETIRACETAM 500 MG: 100 INJECTION, SOLUTION INTRAVENOUS at 08:03

## 2024-03-27 RX ADMIN — FAMOTIDINE 20 MG: 10 INJECTION, SOLUTION INTRAVENOUS at 08:03

## 2024-03-27 RX ADMIN — LEVETIRACETAM INJECTION 1000 MG: 10 INJECTION INTRAVENOUS at 01:03

## 2024-03-27 RX ADMIN — SODIUM CHLORIDE: 9 INJECTION, SOLUTION INTRAVENOUS at 02:03

## 2024-03-27 RX ADMIN — MUPIROCIN: 20 OINTMENT TOPICAL at 08:03

## 2024-03-27 RX ADMIN — CEFAZOLIN SODIUM 2 G: 2 SOLUTION INTRAVENOUS at 04:03

## 2024-03-27 RX ADMIN — SODIUM CHLORIDE: 9 INJECTION, SOLUTION INTRAVENOUS at 05:03

## 2024-03-27 RX ADMIN — IOPAMIDOL 60 ML: 755 INJECTION, SOLUTION INTRAVENOUS at 01:03

## 2024-03-27 RX ADMIN — POLYETHYLENE GLYCOL 3350 17 G: 17 POWDER, FOR SOLUTION ORAL at 08:03

## 2024-03-27 RX ADMIN — DEXMEDETOMIDINE HYDROCHLORIDE 1.4 MCG/KG/HR: 400 INJECTION INTRAVENOUS at 03:03

## 2024-03-27 RX ADMIN — HYDRALAZINE HYDROCHLORIDE 10 MG: 20 INJECTION INTRAMUSCULAR; INTRAVENOUS at 05:03

## 2024-03-27 RX ADMIN — ROCURONIUM BROMIDE 50 MG: 10 SOLUTION INTRAVENOUS at 02:03

## 2024-03-27 RX ADMIN — FENTANYL CITRATE 100 MCG: 50 INJECTION, SOLUTION INTRAMUSCULAR; INTRAVENOUS at 01:03

## 2024-03-27 RX ADMIN — LIDOCAINE HYDROCHLORIDE 40 MG: 20 INJECTION, SOLUTION EPIDURAL; INFILTRATION; INTRACAUDAL; PERINEURAL at 02:03

## 2024-03-27 RX ADMIN — SODIUM CHLORIDE 50 ML/HR: 3 INJECTION, SOLUTION INTRAVENOUS at 12:03

## 2024-03-27 RX ADMIN — METHOCARBAMOL 500 MG: 100 INJECTION INTRAMUSCULAR; INTRAVENOUS at 09:03

## 2024-03-27 RX ADMIN — METHOCARBAMOL 500 MG: 100 INJECTION INTRAMUSCULAR; INTRAVENOUS at 05:03

## 2024-03-27 RX ADMIN — FENTANYL CITRATE 50 MCG: 50 INJECTION, SOLUTION INTRAMUSCULAR; INTRAVENOUS at 07:03

## 2024-03-27 RX ADMIN — ROCURONIUM BROMIDE 20 MG: 10 SOLUTION INTRAVENOUS at 03:03

## 2024-03-27 RX ADMIN — Medication 25 MCG/HR: at 08:03

## 2024-03-27 RX ADMIN — HYDROMORPHONE HYDROCHLORIDE 1 MG: 2 INJECTION, SOLUTION INTRAMUSCULAR; INTRAVENOUS; SUBCUTANEOUS at 02:03

## 2024-03-27 RX ADMIN — FENTANYL CITRATE 50 MCG: 50 INJECTION, SOLUTION INTRAMUSCULAR; INTRAVENOUS at 04:03

## 2024-03-27 RX ADMIN — BACITRACIN: 500 OINTMENT TOPICAL at 02:03

## 2024-03-27 RX ADMIN — LIDOCAINE 1 PATCH: 50 PATCH CUTANEOUS at 12:03

## 2024-03-27 RX ADMIN — LEVETIRACETAM 500 MG: 100 INJECTION, SOLUTION INTRAVENOUS at 02:03

## 2024-03-27 RX ADMIN — HYDROMORPHONE HYDROCHLORIDE 1 MG: 2 INJECTION, SOLUTION INTRAMUSCULAR; INTRAVENOUS; SUBCUTANEOUS at 03:03

## 2024-03-27 RX ADMIN — CEFAZOLIN SODIUM 2 G: 2 SOLUTION INTRAVENOUS at 09:03

## 2024-03-27 RX ADMIN — DOCUSATE SODIUM 100 MG: 100 CAPSULE, LIQUID FILLED ORAL at 08:03

## 2024-03-27 RX ADMIN — ACETAMINOPHEN 650 MG: 325 TABLET, FILM COATED ORAL at 09:03

## 2024-03-27 RX ADMIN — ONDANSETRON 4 MG: 2 INJECTION INTRAMUSCULAR; INTRAVENOUS at 03:03

## 2024-03-27 RX ADMIN — ETOMIDATE 20 MG: 2 INJECTION INTRAVENOUS at 01:03

## 2024-03-27 RX ADMIN — ACETAMINOPHEN 650 MG: 325 TABLET, FILM COATED ORAL at 05:03

## 2024-03-27 RX ADMIN — MUPIROCIN: 20 OINTMENT TOPICAL at 09:03

## 2024-03-27 RX ADMIN — BACITRACIN: 500 OINTMENT TOPICAL at 08:03

## 2024-03-27 RX ADMIN — BACITRACIN: 500 OINTMENT TOPICAL at 12:03

## 2024-03-27 RX ADMIN — Medication 10 ML: at 12:03

## 2024-03-27 RX ADMIN — METHOCARBAMOL 500 MG: 100 INJECTION INTRAMUSCULAR; INTRAVENOUS at 01:03

## 2024-03-27 RX ADMIN — PHENYLEPHRINE HYDROCHLORIDE 40 MCG/MIN: 10 INJECTION INTRAVENOUS at 03:03

## 2024-03-27 RX ADMIN — Medication 10 ML: at 05:03

## 2024-03-27 RX ADMIN — CEFAZOLIN 2 G: 330 INJECTION, POWDER, FOR SOLUTION INTRAMUSCULAR; INTRAVENOUS at 02:03

## 2024-03-27 RX ADMIN — DEXAMETHASONE SODIUM PHOSPHATE 4 MG: 4 INJECTION, SOLUTION INTRA-ARTICULAR; INTRALESIONAL; INTRAMUSCULAR; INTRAVENOUS; SOFT TISSUE at 02:03

## 2024-03-27 RX ADMIN — Medication 100 MCG: at 02:03

## 2024-03-27 RX ADMIN — PROPOFOL 50 MG: 10 INJECTION, EMULSION INTRAVENOUS at 02:03

## 2024-03-27 RX ADMIN — SODIUM CHLORIDE 50 ML/HR: 3 INJECTION, SOLUTION INTRAVENOUS at 01:03

## 2024-03-27 RX ADMIN — ACETAMINOPHEN 650 MG: 325 TABLET, FILM COATED ORAL at 01:03

## 2024-03-27 RX ADMIN — ONDANSETRON 4 MG: 2 INJECTION INTRAMUSCULAR; INTRAVENOUS at 01:03

## 2024-03-27 RX ADMIN — ROCURONIUM BROMIDE 20 MG: 10 SOLUTION INTRAVENOUS at 02:03
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
## 2024-03-27 NOTE — NURSING
Nurses Note -- 4 Eyes      3/27/2024   4:59 PM      Skin assessed during: Daily Assessment      [] No Altered Skin Integrity Present    [x]Prevention Measures Documented      [x] Yes- Altered Skin Integrity Present or Discovered   [] LDA Added if Not in Epic (Describe Wound)   [] New Altered Skin Integrity was Present on Admit and Documented in LDA   [] Wound Image Taken    Wound Care Consulted? No    Attending Nurse:  Rufina Dawson RN/Staff Member:  GAGANDEEP Richard

## 2024-03-27 NOTE — ANESTHESIA PROCEDURE NOTES
Central Line    Diagnosis: Intracranial hemmorhage  Patient location during procedure: done in OR  Procedure start time: 3/27/2024 4:15 AM  Timeout: 3/27/2024 4:15 AM  Procedure end time: 3/27/2024 4:15 AM    Staffing  Authorizing Provider: Karan Woody MD  Performing Provider: Karan Woody MD    Staffing  Performed: anesthesiologist   Performed by: Karan Woody MD  Authorized by: Karan Woody MD    Anesthesiologist was present at the time of the procedure.  Preanesthetic Checklist  Completed: patient identified, IV checked, site marked, risks and benefits discussed, surgical consent, monitors and equipment checked, pre-op evaluation, timeout performed and anesthesia consent given  Indication   Indication: hemodynamic monitoring, vascular access, med administration     Anesthesia   general anesthesia    Central Line   Skin Prep: skin prepped with ChloraPrep, skin prep agent completely dried prior to procedure  Sterile Barriers Followed: Yes    All five maximal barriers used- gloves, gown, cap, mask, and large sterile sheet    hand hygiene performed prior to central venous catheter insertion  Location: right internal jugular.   Catheter type: triple lumen  Catheter Size: 7 Fr  Inserted Catheter Length: 16 cm  Ultrasound: vascular probe with ultrasound   Vessel Caliber: medium, patent, compressibility normal  Needle advanced into vessel with real time Ultrasound guidance.  Guidewire confirmed in vessel.  Image recorded and saved.  sterile gel and probe cover used in ultrasound-guided central venous catheter insertion  Manometry: none  Insertion Attempts: 2   Securement:line sutured, chlorhexidine patch, sterile dressing applied and blood return through all ports    Post-Procedure    Adverse Events:none      Guidewire Guidewire removed intact. Guidewire removed intact, verified with nurse.  Additional Notes  Pt came to OR with R hemo/pneumothorax.          ICU will be getting CXR's

## 2024-03-27 NOTE — PROCEDURES
Emergency Endotracheal Intubation Procedure Note    Date: 3/27/2024  Time: 1:20 am  Indication:  Acute respiratory distress/ Altered mental status/airway protection  Attending:  Dr. Kinney    The patient was in the supine position. IV meds given, including fentanyl and etomidate. The patient was initially ventilated using an ambu bag. The glidescope was used and inserted into the oropharynx with Grade 1 view of the vocal cords. Thick secretions were noted and were suctioned off. A 7.5-Divehi endotracheal tube was inserted and visualized going through the vocal cords. The stylette was removed and ETT balloon was inflated.  Colorimetric change was visualized on the CO2 capnometer. Breath sounds were heard in both lung fields equally. ETT secured. The endotracheal tube was placed at 23 cm, measured at the teeth.  CXR ordered.  Pt otherwise tolerated the procedure well.

## 2024-03-27 NOTE — PROGRESS NOTES
[facility] [address] [place] [address]  Neurosurgery  Progress Note    Subjective:     Interval History:     POD #0: Craniotomy, For Subdural Hematoma Evacuation-Right    Patient is intubated and sedated in the ICU. Hemovac has no drainage output. Sodium is 140 this AM.       Post-Op Info:  Procedure(s) (LRB):  CRANIOTOMY, FOR SUBDURAL HEMATOMA EVACUATION-RIGHT (Right)  Insertion,central venous access device (Right)   Day of Surgery      Medications:  Continuous Infusions:   sodium chloride 0.9% Stopped (03/27/24 0048)    propofoL      sodium chloride 3% HYPERTONIC 50 mL/hr (03/27/24 1100)     Scheduled Meds:   acetaminophen  650 mg Oral Q4H    bacitracin   Topical (Top) TID    ceFAZolin (Ancef) IV (PEDS and ADULTS)  2 g Intravenous Q8H    docusate sodium  100 mg Oral BID    famotidine (PF)  20 mg Intravenous BID    levETIRAcetam (Keppra) IV (PEDS and ADULTS)  500 mg Intravenous Q12H    LIDOcaine  1 patch Transdermal Q24H    methocarbamoL  500 mg Intravenous Q8H    mupirocin   Nasal BID    polyethylene glycol  17 g Oral BID    senna  8.6 mg Oral Daily    sodium chloride 0.9%  10 mL Intravenous Q6H     PRN Meds:0.9%  NaCl infusion (for blood administration), 0.9%  NaCl infusion (for blood administration), bisacodyL, etomidate, fentaNYL, fentaNYL, hydrALAZINE, labetaloL, melatonin, morphine, ondansetron, oxyCODONE, Flushing PICC/Midline Protocol **AND** sodium chloride 0.9% **AND** sodium chloride 0.9%     Review of Systems  Objective:     Weight: 55.2 kg (121 lb 11.1 oz)  Body mass index is 19.64 kg/m².  Vital Signs (Most Recent):  Temp: (!) 100.5 °F (38.1 °C) (03/27/24 0800)  Pulse: 102 (03/27/24 1100)  Resp: 10 (03/27/24 0915)  BP: 95/62 (03/27/24 1100)  SpO2: 100 % (03/27/24 1100) Vital Signs (24h Range):  Temp:  [97.2 °F (36.2 °C)-100.5 °F (38.1 °C)] 100.5 °F (38.1 °C)  Pulse:  [] 102  Resp:  [9-96] 10  SpO2:  [85 %-100 %] 100 %  BP: ()/() 95/62  Arterial Line BP: (105-169)/() 105/45  "    Date 03/27/24 0700 - 03/28/24 0659   Shift 3684-2505 7444-9133 5489-9695 24 Hour Total   INTAKE   I.V.(mL/kg) 225.3(4.1)   225.3(4.1)   IV Piggyback 150.3   150.3   Shift Total(mL/kg) 375.6(6.8)   375.6(6.8)   OUTPUT   Urine(mL/kg/hr) 290   290   Shift Total(mL/kg) 290(5.3)   290(5.3)   Weight (kg) 55.2 55.2 55.2 55.2              Vent Mode: A/C  Oxygen Concentration (%):  [50-70] 50  Resp Rate Total:  [24 br/min-26 br/min] 26 br/min  Vt Set:  [450 mL] 450 mL  PEEP/CPAP:  [5 cmH20-6 cmH20] 5 cmH20  Mean Airway Pressure:  [10 rdH01-71 cmH20] 10 cmH20             NG/OG Tube 03/27/24 0124 orogastric 18 Fr. Center mouth (Active)   Distal Tube Length (cm) 65 03/27/24 0800   Tolerance no signs/symptoms of discomfort 03/27/24 0800   Securement secured to commercial device 03/27/24 0800   Clamp Status/Tolerance unclamped;no abdominal discomfort;no abdominal distention;no emesis;no nausea;no residual;no restlessness 03/27/24 0800   Suction Setting/Drainage Method suction at;low;intermittent setting 03/27/24 0800   Insertion Site Appearance no redness, warmth, tenderness, skin breakdown, drainage 03/27/24 0800   Tube Output(mL)(Include Discarded Residual) 50 mL 03/27/24 0539            Urethral Catheter 03/26/24 2304 16 Fr. (Active)   Site Assessment Intact;Clean;Dry 03/27/24 0800   Collection Container Urimeter 03/27/24 0800   Securement Method secured to top of thigh w/ adhesive device 03/27/24 0800   Catheter Care Performed yes 03/27/24 0800   Reason for Continuing Urinary Catheterization Critically ill in ICU and requiring hourly monitoring of intake/output 03/27/24 0800   CAUTI Prevention Bundle Securement Device in place with 1" slack;Intact seal between catheter & drainage tubing;Drainage bag/urimeter off the floor;Sheeting clip in use;No dependent loops or kinks;Drainage bag/urimeter not overfilled (<2/3 full);Drainage bag/urimeter below bladder 03/27/24 0800   Output (mL) 90 mL 03/27/24 1000            " Drain/Device  03/27/24 0700 frontal region collapsible closed device (Active)   Insertion Site dressing intact;clean and dry 03/27/24 0800   Drainage Characteristics/Odor Sanguineous 03/27/24 0800   Drainage Amount Scant 03/27/24 0800       Neurosurgery Physical Exam:  Intubated and sedated  Pupils are 3mm, reactive  Unable to follow commands  Pain withdrawal from BLE   Surgical dressing is clean, dry, intact  Minimal CSF serosanguinous drainage in hemovac tubing    Significant Labs:  Recent Labs   Lab 03/26/24 2140 03/27/24  0017 03/27/24  0601    138 140  140   K 3.3* 3.2* 3.6   CO2 21* 23 20*   BUN 21.2 20.4 19.2   CREATININE 0.86 0.84 0.87   CALCIUM 9.2 8.9 8.6*     Recent Labs   Lab 03/26/24 2140 03/27/24  0017 03/27/24  0601   WBC 15.15* 16.70* 15.82*   HGB 12.4* 11.7* 9.4*   HCT 39.1* 34.9* 29.1*    210 244     Recent Labs   Lab 03/26/24 2203 03/27/24  0601   INR 1.2 1.2     Microbiology Results (last 7 days)       ** No results found for the last 168 hours. **            Significant Diagnostics:    3/27/2024 CT Head W/O Contrast: Postoperative changes following right-sided craniotomy for evacuation of subdural hemorrhage with decreased mass effect.      Assessment/Plan:     Active Diagnoses:    Diagnosis Date Noted POA    PRINCIPAL PROBLEM:  Traumatic subdural hematoma (SDH) [S06.5XAA] 03/27/2024 Yes    Acute hypoxemic respiratory failure [J96.01] 03/27/2024 No    Aspiration pneumonia due to gastric secretions [J69.0] 03/27/2024 No    Closed fracture of multiple ribs [S22.49XA] 03/27/2024 Yes      Problems Resolved During this Admission:     Plan:   Remain in TICU  Q1H Neuro checks, wean sedation as tolerated  HOB 30 degrees  Hemovac to full suction, monitor output  Ancef 24H  Keppra 500mg BID x 7 days  3% Saline, Na goal of 145-155  Q6H Na level  Fall precaution  SCDs      SYMONE [facility]  Neurosurgery  [facility] [address] [facility]
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
## 2024-03-27 NOTE — ANESTHESIA POSTPROCEDURE EVALUATION
Anesthesia Post Evaluation    Patient: Anayeli Taylor    Procedure(s) Performed: Procedure(s) (LRB):  CRANIOTOMY, FOR SUBDURAL HEMATOMA EVACUATION-RIGHT (Right)  Insertion,central venous access device (Right)    Final Anesthesia Type: general      Patient location during evaluation: ICU  Patient participation: No - Unable to Participate, Intubation  Level of consciousness: sedated  Post-procedure vital signs: reviewed and stable  Pain management: adequate  Airway patency: patent  BECKIE mitigation strategies: Multimodal analgesia  PONV status at discharge: No PONV  Anesthetic complications: no      Cardiovascular status: blood pressure returned to baseline and hemodynamically stable  Respiratory status: ETT and ventilator  Hydration status: euvolemic  Follow-up not needed.          Vitals Value Taken Time   BP 92/52 03/27/24 1631   Temp 37.8 °C (100.1 °F) 03/27/24 1200   Pulse 91 03/27/24 1632   Resp 24 03/27/24 1602   SpO2 100 % 03/27/24 1632   Vitals shown include unvalidated device data.      No case tracking events are documented in the log.      Pain/Shailesh Score: Pain Rating Prior to Med Admin: 0 (3/27/2024  1:23 PM)  Pain Rating Post Med Admin: 0 (3/27/2024  2:23 PM)

## 2024-03-27 NOTE — BRIEF OP NOTE
[facility] - [facility] [place] [facility]  Brief Operative Note    SUMMARY     Surgery Date: 3/27/2024     Surgeon(s) and Role:     * Davonte Resendiz MD - Primary    Assisting Surgeon: None    Pre-op Diagnosis:  Subdural hematoma [S06.5XAA]    Post-op Diagnosis:  Post-Op Diagnosis Codes:     * Subdural hematoma [S06.5XAA]    Procedure(s) (LRB):  CRANIOTOMY, FOR SUBDURAL HEMATOMA EVACUATION-RIGHT (Right)  Insertion,central venous access device (Right)    Anesthesia: General    Implants:  Implant Name Type Inv. Item Serial No.  Lot No. LRB No. Used Action   DURAMATRIX ONLAY PLUS 5X7 - [id number]  DURAMATRIX ONLAY PLUS 5X7  [employer] [id number] Right 1 Implanted   SCREW SD 1.5X4MM TI CROSS PIN - [id number] Screw SCREW SD 1.5X4MM TI CROSS PIN  [employer].  Right 8 Implanted   COVER UN3 BURRHOLE 14MM TAB - [id number] Plate COVER UN3 BURRHOLE 14MM TAB  [employer]OMARA.  Right 4 Implanted     Operative Findings:  Acute subdural hematoma was evacuated  No active bleeding identified  Minimally pulsatile brain    Estimated Blood Loss: 200 mL    Estimated Blood Loss has been documented.         Specimens: none    PLAN  - Back to trauma ICU  - Q1h neuro checks, wean sedation as tolerated  - CT head 6am  - HOB 30 degrees  - Medium hemovac to full suction  - Ancef 24h  - Keppra 500mg BID x 7 days  - 3% saline, Na goal 145 - 155  - SCDs    Davonte De La Paz MD  Neurosurgery

## 2024-03-27 NOTE — ED NOTES
Bed: 32  Expected date:   Expected time:   Means of arrival:   Comments:  Trauma   Impression: S/P Cataract Extraction by phacoemulsification with IOL placement; LRI OS - 1 Day. Presence of intraocular lens  Z96.1. Excellent post op course   Post operative instructions reviewed - Plan: Pt doing well RTC as scheduled --Advised patient to use artificial tears for comfort.

## 2024-03-27 NOTE — NURSING
Nurses Note -- 4 Eyes      3/27/2024   2:15 AM      Skin assessed during: Admit      [] No Altered Skin Integrity Present    [x]Prevention Measures Documented      [x] Yes- Altered Skin Integrity Present or Discovered   [x] LDA Added if Not in Epic (Describe Wound)   [] New Altered Skin Integrity was Present on Admit and Documented in LDA   [] Wound Image Taken    Wound Care Consulted? Yes    Attending Nurse:  Graciela Dawson RN/Staff Member:  chema
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
## 2024-03-27 NOTE — ANESTHESIA PROCEDURE NOTES
Arterial    Diagnosis: SAH    Patient location during procedure: done in OR    Staffing  Authorizing Provider: Karan Woody MD  Performing Provider: Karan Woody MD    Staffing  Performed by: Karan Woody MD  Authorized by: Karan Woody MD    Anesthesiologist was present at the time of the procedure.    Preanesthetic Checklist  Completed: patient identified, IV checked, site marked, risks and benefits discussed, surgical consent, monitors and equipment checked, pre-op evaluation, timeout performed and anesthesia consent givenArterial  Skin Prep: chlorhexidine gluconate  Local Infiltration: lidocaine  Orientation: right  Location: radial    Catheter Size: 20 G  Catheter placement by Anatomical landmarks. Heme positive aspiration all ports. Insertion Attempts: 1  Assessment  Dressing: secured with tape and tegaderm  Patient: Tolerated well

## 2024-03-27 NOTE — LOPA/MORA/SWTA/AOC/AEB
[facility] ([facility])  Notification of Referral  [facility] Contact # [phone]        Thank you for the referral of this patient to determine suitability for organ, tissue, and eye donation.  A chart review has been conducted (date):2024 at (time) 9:54 AM.    ? Potential candidate for organ donation - BENNIE following patient. Any changes in patients condition, discussion of withdrawing the vent or brain death exams, or family mention of donation immediately call [phone]. Refer all organ referrals within 1 hour of meeting the clinical triger of a patient with a neurological, anoxic, or life threatening injury and ONE of the following:    * GCS </= 8    * Loss of 2 or more brain stem reflexes    * Hypothermic Protocol Initiated    * Withdrawal of support discussion regardless of GCS    * Family mention of Donation    ? Potential for candidate for tissue and eye donation- call BENNIE at [phone] within 2 hours of death for screening as a potential tissue and/or eye donor.      ? Potential candidate for eye donation - call BENNIE at [phone] within 2 hours of death for screening as a potential eye donor.    ? NOT a candidate for organ/tissue/eye donation- call BENNIE at [phone] within 2 hours of death to report the time of death.    ? Potential candidate for donation/ Referral Closed- Any changes in patients condition, GCS of 5 or less, discussion of withdrawing the vent, brain death exams, or family mention of donation immediately call [phone].      Screened by: Jossue Lagos    [facility] Referral Number:  [id number]    Suitable for:  [x]Organ  [x] Tissue  [x] Eye  []Not suitable for Donation    Rule out Reason:     Patient Name: Osmany Taylor                   73 y.o. male  Patient MRN: [id number]  : 1950  DOD:  TOD:  Cause of Death:     [x]Vented Patient  [facility] representative to approach family if appropriate  []DNR status obtained Referral of critical care patients  when family initiates Do Not Resuscitate  []Cardiac Death   Clinical Support Center to approach family via telephone if appropriate  []Donor Registry  Patient is listed in the Donor Registry    Completed by: Jossue Lagos
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
## 2024-03-27 NOTE — TRAUMA COM
Patient with acute change in mentation and emesis. Dr. Kinney at bedside. To CT for repeat. Platelets ordered. CT noted worsening of bleed. Dr De La Paz contacted at 0106 to inform. Once images were available, he notified of operative plan at 0121. 2u Plts, Mannitol and 3% administered    Margarita Mueller [facility]   Trauma/Acute Care Surgery  [facility]  C: [phone]

## 2024-03-27 NOTE — TRANSFER OF CARE
"Anesthesia Transfer of Care Note    Patient: Osmany Taylor    Procedure(s) Performed: Procedure(s) (LRB):  CRANIOTOMY, FOR SUBDURAL HEMATOMA EVACUATION-RIGHT (Right)  Insertion,central venous access device (Right)    Patient location: ICU    Anesthesia Type: general    Transport from OR: Transported from OR intubated on 100% O2 by AMBU with adequate controlled ventilation. Upon arrival to PACU/ICU, patient attached to ventilator and auscultated to confirm bilateral breath sounds and adequate TV. Continuous SpO2 monitoring in transport. Continuous ECG monitoring in transport. Continuos invasive BP monitoring in transport    Post pain: adequate analgesia    Post assessment: no apparent anesthetic complications    Post vital signs: stable    Level of consciousness: sedated    Nausea/Vomiting: no nausea/vomiting    Complications: none    Transfer of care protocol was followedComments: Detailed report with handoff to licensed provider complete      Last vitals: Visit Vitals  BP (!) 98/57   Pulse 80   Temp 36.5 °C (97.7 °F) (Axillary)   Resp (!) 26   Ht 5' 6" (1.676 m)   Wt 55.2 kg (121 lb 11.1 oz)   SpO2 100%   BMI 19.64 kg/m²     "
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
## 2024-03-27 NOTE — CODE/ RAPID DOCUMENTATION
0013 taken off by dr. Bunn. Pt intubated by dr. Bunn. 7.5 @ 23 lip, og at 65 cxray to follow. Bp 113/81 hr sinus rhythm oxygen 100% vc [id number] peep 6 [id number]

## 2024-03-28 LAB
ALBUMIN SERPL-MCNC: 3.8 G/DL (ref 3.4–4.8)
ALBUMIN/GLOB SERPL: 1.3 RATIO (ref 1.1–2)
ALLENS TEST BLOOD GAS (OHS): ABNORMAL
ALLENS TEST BLOOD GAS (OHS): ABNORMAL
ALP SERPL-CCNC: 55 UNIT/L (ref 40–150)
ALT SERPL-CCNC: 88 UNIT/L (ref 0–55)
ANION GAP SERPL CALC-SCNC: 4 MEQ/L
AST SERPL-CCNC: 141 UNIT/L (ref 5–34)
BASE EXCESS BLD CALC-SCNC: 1 MMOL/L (ref -2–2)
BASE EXCESS BLD CALC-SCNC: 2 MMOL/L (ref -2–2)
BASOPHILS # BLD AUTO: 0.01 X10(3)/MCL
BASOPHILS # BLD AUTO: 0.03 X10(3)/MCL
BASOPHILS NFR BLD AUTO: 0.1 %
BASOPHILS NFR BLD AUTO: 0.2 %
BILIRUB SERPL-MCNC: 0.3 MG/DL
BLOOD GAS SAMPLE TYPE (OHS): ABNORMAL
BLOOD GAS SAMPLE TYPE (OHS): ABNORMAL
BUN SERPL-MCNC: 16.6 MG/DL (ref 8.4–25.7)
BUN SERPL-MCNC: 21.6 MG/DL (ref 8.4–25.7)
CA-I BLD-SCNC: 1.07 MMOL/L (ref 1.12–1.23)
CA-I BLD-SCNC: 1.09 MMOL/L (ref 1.12–1.23)
CALCIUM SERPL-MCNC: 7.9 MG/DL (ref 8.8–10)
CALCIUM SERPL-MCNC: 9.3 MG/DL (ref 8.8–10)
CHLORIDE SERPL-SCNC: 105 MMOL/L (ref 98–107)
CHLORIDE SERPL-SCNC: 124 MMOL/L (ref 98–107)
CO2 BLDA-SCNC: 26.1 MMOL/L
CO2 BLDA-SCNC: 27.7 MMOL/L
CO2 SERPL-SCNC: 16 MMOL/L (ref 23–31)
CO2 SERPL-SCNC: 24 MMOL/L (ref 23–31)
COHGB MFR BLDA: 1.4 % (ref 0.5–1.5)
COHGB MFR BLDA: 1.6 % (ref 0.5–1.5)
CPAP BLOOD GAS (OHS): 5 CM H2O
CREAT SERPL-MCNC: 0.75 MG/DL (ref 0.73–1.18)
CREAT SERPL-MCNC: 0.88 MG/DL (ref 0.73–1.18)
CREAT/UREA NIT SERPL: 22
CRP SERPL-MCNC: 1.6 MG/L
DRAWN BY BLOOD GAS (OHS): ABNORMAL
DRAWN BY BLOOD GAS (OHS): ABNORMAL
EOSINOPHIL # BLD AUTO: 0 X10(3)/MCL (ref 0–0.9)
EOSINOPHIL # BLD AUTO: 0 X10(3)/MCL (ref 0–0.9)
EOSINOPHIL NFR BLD AUTO: 0 %
EOSINOPHIL NFR BLD AUTO: 0 %
ERYTHROCYTE [DISTWIDTH] IN BLOOD BY AUTOMATED COUNT: 15.3 % (ref 11.5–17)
ERYTHROCYTE [DISTWIDTH] IN BLOOD BY AUTOMATED COUNT: 15.6 % (ref 11.5–17)
GFR SERPLBLD CREATININE-BSD FMLA CKD-EPI: >60 MLS/MIN/1.73/M2
GFR SERPLBLD CREATININE-BSD FMLA CKD-EPI: >60 MLS/MIN/1.73/M2
GLOBULIN SER-MCNC: 2.9 GM/DL (ref 2.4–3.5)
GLUCOSE SERPL-MCNC: 120 MG/DL (ref 82–115)
GLUCOSE SERPL-MCNC: 154 MG/DL (ref 82–115)
HCO3 BLDA-SCNC: 25 MMOL/L (ref 22–26)
HCO3 BLDA-SCNC: 26.5 MMOL/L (ref 22–26)
HCT VFR BLD AUTO: 22 % (ref 42–52)
HCT VFR BLD AUTO: 23.6 % (ref 42–52)
HGB BLD-MCNC: 7 G/DL (ref 14–18)
HGB BLD-MCNC: 7.6 G/DL (ref 14–18)
IMM GRANULOCYTES # BLD AUTO: 0.03 X10(3)/MCL (ref 0–0.04)
IMM GRANULOCYTES # BLD AUTO: 0.04 X10(3)/MCL (ref 0–0.04)
IMM GRANULOCYTES NFR BLD AUTO: 0.2 %
IMM GRANULOCYTES NFR BLD AUTO: 0.3 %
INHALED O2 CONCENTRATION: 30 %
INHALED O2 CONCENTRATION: 30 %
LYMPHOCYTES # BLD AUTO: 0.82 X10(3)/MCL (ref 0.6–4.6)
LYMPHOCYTES # BLD AUTO: 1.25 X10(3)/MCL (ref 0.6–4.6)
LYMPHOCYTES NFR BLD AUTO: 7 %
LYMPHOCYTES NFR BLD AUTO: 9.7 %
MAGNESIUM SERPL-MCNC: 2.1 MG/DL (ref 1.6–2.6)
MCH RBC QN AUTO: 30 PG (ref 27–31)
MCH RBC QN AUTO: 30.2 PG (ref 27–31)
MCHC RBC AUTO-ENTMCNC: 31.8 G/DL (ref 33–36)
MCHC RBC AUTO-ENTMCNC: 32.2 G/DL (ref 33–36)
MCV RBC AUTO: 93.7 FL (ref 80–94)
MCV RBC AUTO: 94.4 FL (ref 80–94)
MECH RR (OHS): 20 B/MIN
METHGB MFR BLDA: 0.8 % (ref 0.4–1.5)
METHGB MFR BLDA: 1 % (ref 0.4–1.5)
MODE (OHS): ABNORMAL
MODE (OHS): AC
MONOCYTES # BLD AUTO: 1.16 X10(3)/MCL (ref 0.1–1.3)
MONOCYTES # BLD AUTO: 1.28 X10(3)/MCL (ref 0.1–1.3)
MONOCYTES NFR BLD AUTO: 10 %
MONOCYTES NFR BLD AUTO: 9.9 %
NEUTROPHILS # BLD AUTO: 10.24 X10(3)/MCL (ref 2.1–9.2)
NEUTROPHILS # BLD AUTO: 9.72 X10(3)/MCL (ref 2.1–9.2)
NEUTROPHILS NFR BLD AUTO: 79.9 %
NEUTROPHILS NFR BLD AUTO: 82.7 %
NRBC BLD AUTO-RTO: 0 %
NRBC BLD AUTO-RTO: 0 %
O2 HB BLOOD GAS (OHS): 96.7 % (ref 94–97)
O2 HB BLOOD GAS (OHS): 97.3 % (ref 94–97)
OXYGEN DEVICE BLOOD GAS (OHS): ABNORMAL
OXYGEN DEVICE BLOOD GAS (OHS): ABNORMAL
OXYHGB MFR BLDA: 7.7 G/DL (ref 12–16)
OXYHGB MFR BLDA: 7.8 G/DL (ref 12–16)
PCO2 BLDA: 36 MMHG (ref 35–45)
PCO2 BLDA: 40 MMHG (ref 35–45)
PEEP RESPIRATORY: 5 CMH2O
PH BLDA: 7.43 [PH] (ref 7.35–7.45)
PH BLDA: 7.45 [PH] (ref 7.35–7.45)
PHOSPHATE SERPL-MCNC: 1.6 MG/DL (ref 2.3–4.7)
PLATELET # BLD AUTO: 164 X10(3)/MCL (ref 130–400)
PLATELET # BLD AUTO: 183 X10(3)/MCL (ref 130–400)
PMV BLD AUTO: 9.9 FL (ref 7.4–10.4)
PMV BLD AUTO: 9.9 FL (ref 7.4–10.4)
PO2 BLDA: 117 MMHG (ref 80–100)
PO2 BLDA: 94 MMHG (ref 80–100)
POTASSIUM BLOOD GAS (OHS): 3.4 MMOL/L (ref 3.5–5)
POTASSIUM BLOOD GAS (OHS): 3.4 MMOL/L (ref 3.5–5)
POTASSIUM SERPL-SCNC: 3.5 MMOL/L (ref 3.5–5.1)
POTASSIUM SERPL-SCNC: 3.6 MMOL/L (ref 3.5–5.1)
PREALB SERPL-MCNC: 14.3 MG/DL (ref 16–42)
PROT SERPL-MCNC: 6.7 GM/DL (ref 5.8–7.6)
PS (OHS): 10 CMH2O
RBC # BLD AUTO: 2.33 X10(6)/MCL (ref 4.7–6.1)
RBC # BLD AUTO: 2.52 X10(6)/MCL (ref 4.7–6.1)
SAMPLE SITE BLOOD GAS (OHS): ABNORMAL
SAMPLE SITE BLOOD GAS (OHS): ABNORMAL
SAO2 % BLDA: 97.5 %
SAO2 % BLDA: 98.7 %
SODIUM BLOOD GAS (OHS): 146 MMOL/L (ref 137–145)
SODIUM BLOOD GAS (OHS): 151 MMOL/L (ref 137–145)
SODIUM SERPL-SCNC: 139 MMOL/L (ref 136–145)
SODIUM SERPL-SCNC: 150 MMOL/L (ref 136–145)
SODIUM SERPL-SCNC: 152 MMOL/L (ref 136–145)
SODIUM SERPL-SCNC: 153 MMOL/L (ref 136–145)
SPONT RR (OHS): 16 B/MIN
SPONT+MECH VT ON VENT: 450 ML
WBC # SPEC AUTO: 11.75 X10(3)/MCL (ref 4.5–11.5)
WBC # SPEC AUTO: 12.83 X10(3)/MCL (ref 4.5–11.5)

## 2024-03-28 PROCEDURE — 99900026 HC AIRWAY MAINTENANCE (STAT)

## 2024-03-28 PROCEDURE — 20800000 HC ICU TRAUMA

## 2024-03-28 PROCEDURE — 94760 N-INVAS EAR/PLS OXIMETRY 1: CPT | Mod: XB

## 2024-03-28 PROCEDURE — 99024 POSTOP FOLLOW-UP VISIT: CPT | Mod: ,,, | Performed by: STUDENT IN AN ORGANIZED HEALTH CARE EDUCATION/TRAINING PROGRAM

## 2024-03-28 PROCEDURE — 27100171 HC OXYGEN HIGH FLOW UP TO 24 HOURS

## 2024-03-28 PROCEDURE — 84134 ASSAY OF PREALBUMIN: CPT | Performed by: NURSE PRACTITIONER

## 2024-03-28 PROCEDURE — 84100 ASSAY OF PHOSPHORUS: CPT | Performed by: SURGERY

## 2024-03-28 PROCEDURE — 84295 ASSAY OF SERUM SODIUM: CPT | Performed by: NURSE PRACTITIONER

## 2024-03-28 PROCEDURE — 85025 COMPLETE CBC W/AUTO DIFF WBC: CPT | Performed by: NURSE PRACTITIONER

## 2024-03-28 PROCEDURE — 63600175 PHARM REV CODE 636 W HCPCS: Performed by: NURSE PRACTITIONER

## 2024-03-28 PROCEDURE — 80053 COMPREHEN METABOLIC PANEL: CPT | Performed by: NURSE PRACTITIONER

## 2024-03-28 PROCEDURE — 82803 BLOOD GASES ANY COMBINATION: CPT

## 2024-03-28 PROCEDURE — 85025 COMPLETE CBC W/AUTO DIFF WBC: CPT | Performed by: SURGERY

## 2024-03-28 PROCEDURE — 63600175 PHARM REV CODE 636 W HCPCS: Performed by: STUDENT IN AN ORGANIZED HEALTH CARE EDUCATION/TRAINING PROGRAM

## 2024-03-28 PROCEDURE — 86140 C-REACTIVE PROTEIN: CPT | Performed by: NURSE PRACTITIONER

## 2024-03-28 PROCEDURE — 37799 UNLISTED PX VASCULAR SURGERY: CPT

## 2024-03-28 PROCEDURE — 83735 ASSAY OF MAGNESIUM: CPT | Performed by: SURGERY

## 2024-03-28 PROCEDURE — 25000003 PHARM REV CODE 250: Performed by: NURSE PRACTITIONER

## 2024-03-28 PROCEDURE — 25000003 PHARM REV CODE 250: Performed by: SURGERY

## 2024-03-28 PROCEDURE — 94003 VENT MGMT INPAT SUBQ DAY: CPT

## 2024-03-28 PROCEDURE — A4216 STERILE WATER/SALINE, 10 ML: HCPCS | Performed by: NURSE PRACTITIONER

## 2024-03-28 PROCEDURE — 94150 VITAL CAPACITY TEST: CPT

## 2024-03-28 PROCEDURE — 27200966 HC CLOSED SUCTION SYSTEM

## 2024-03-28 PROCEDURE — 63600175 PHARM REV CODE 636 W HCPCS: Performed by: SURGERY

## 2024-03-28 PROCEDURE — 94010 BREATHING CAPACITY TEST: CPT

## 2024-03-28 PROCEDURE — 99900035 HC TECH TIME PER 15 MIN (STAT)

## 2024-03-28 PROCEDURE — 99900031 HC PATIENT EDUCATION (STAT)

## 2024-03-28 RX ORDER — OXYCODONE HYDROCHLORIDE 5 MG/1
5 TABLET ORAL EVERY 4 HOURS PRN
Status: DISCONTINUED | OUTPATIENT
Start: 2024-03-28 | End: 2024-03-28

## 2024-03-28 RX ORDER — MAGNESIUM SULFATE HEPTAHYDRATE 40 MG/ML
4 INJECTION, SOLUTION INTRAVENOUS
Status: DISCONTINUED | OUTPATIENT
Start: 2024-03-28 | End: 2024-04-03

## 2024-03-28 RX ORDER — POTASSIUM CHLORIDE 20 MEQ/1
20 TABLET, EXTENDED RELEASE ORAL ONCE
Status: DISCONTINUED | OUTPATIENT
Start: 2024-03-28 | End: 2024-03-28

## 2024-03-28 RX ORDER — POTASSIUM CHLORIDE 7.45 MG/ML
40 INJECTION INTRAVENOUS
Status: DISCONTINUED | OUTPATIENT
Start: 2024-03-28 | End: 2024-04-03

## 2024-03-28 RX ORDER — CALCIUM GLUCONATE 20 MG/ML
2 INJECTION, SOLUTION INTRAVENOUS
Status: DISCONTINUED | OUTPATIENT
Start: 2024-03-28 | End: 2024-04-03

## 2024-03-28 RX ORDER — CALCIUM GLUCONATE 20 MG/ML
1 INJECTION, SOLUTION INTRAVENOUS
Status: DISCONTINUED | OUTPATIENT
Start: 2024-03-28 | End: 2024-04-03

## 2024-03-28 RX ORDER — POTASSIUM CHLORIDE 7.45 MG/ML
10 INJECTION INTRAVENOUS
Status: DISCONTINUED | OUTPATIENT
Start: 2024-03-28 | End: 2024-04-03

## 2024-03-28 RX ORDER — MAGNESIUM SULFATE HEPTAHYDRATE 40 MG/ML
2 INJECTION, SOLUTION INTRAVENOUS
Status: DISCONTINUED | OUTPATIENT
Start: 2024-03-28 | End: 2024-04-03

## 2024-03-28 RX ORDER — OXYCODONE HYDROCHLORIDE 5 MG/1
5 TABLET ORAL EVERY 6 HOURS
Status: DISCONTINUED | OUTPATIENT
Start: 2024-03-28 | End: 2024-03-29

## 2024-03-28 RX ORDER — CALCIUM GLUCONATE 20 MG/ML
3 INJECTION, SOLUTION INTRAVENOUS
Status: DISCONTINUED | OUTPATIENT
Start: 2024-03-28 | End: 2024-04-03

## 2024-03-28 RX ORDER — GABAPENTIN 300 MG/1
300 CAPSULE ORAL 3 TIMES DAILY
Status: DISCONTINUED | OUTPATIENT
Start: 2024-03-28 | End: 2024-03-29

## 2024-03-28 RX ADMIN — FAMOTIDINE 20 MG: 10 INJECTION, SOLUTION INTRAVENOUS at 08:03

## 2024-03-28 RX ADMIN — OXYCODONE HYDROCHLORIDE 5 MG: 5 TABLET ORAL at 06:03

## 2024-03-28 RX ADMIN — GABAPENTIN 300 MG: 300 CAPSULE ORAL at 08:03

## 2024-03-28 RX ADMIN — POLYETHYLENE GLYCOL 3350 17 G: 17 POWDER, FOR SOLUTION ORAL at 08:03

## 2024-03-28 RX ADMIN — THERA TABS 1 TABLET: TAB at 04:03

## 2024-03-28 RX ADMIN — OXYCODONE HYDROCHLORIDE 5 MG: 5 TABLET ORAL at 11:03

## 2024-03-28 RX ADMIN — BACITRACIN: 500 OINTMENT TOPICAL at 08:03

## 2024-03-28 RX ADMIN — CEFAZOLIN SODIUM 2 G: 2 SOLUTION INTRAVENOUS at 12:03

## 2024-03-28 RX ADMIN — Medication 100 MCG/HR: at 08:03

## 2024-03-28 RX ADMIN — LEVETIRACETAM 500 MG: 100 INJECTION, SOLUTION INTRAVENOUS at 08:03

## 2024-03-28 RX ADMIN — Medication 10 ML: at 12:03

## 2024-03-28 RX ADMIN — MUPIROCIN: 20 OINTMENT TOPICAL at 08:03

## 2024-03-28 RX ADMIN — CALCIUM GLUCONATE 1 G: 20 INJECTION, SOLUTION INTRAVENOUS at 09:03

## 2024-03-28 RX ADMIN — LIDOCAINE 1 PATCH: 50 PATCH CUTANEOUS at 01:03

## 2024-03-28 RX ADMIN — BACITRACIN: 500 OINTMENT TOPICAL at 03:03

## 2024-03-28 RX ADMIN — DOCUSATE SODIUM 100 MG: 100 CAPSULE, LIQUID FILLED ORAL at 08:03

## 2024-03-28 RX ADMIN — METHOCARBAMOL 500 MG: 100 INJECTION INTRAMUSCULAR; INTRAVENOUS at 06:03

## 2024-03-28 RX ADMIN — ACETAMINOPHEN 650 MG: 325 TABLET, FILM COATED ORAL at 09:03

## 2024-03-28 RX ADMIN — METHOCARBAMOL 500 MG: 100 INJECTION INTRAMUSCULAR; INTRAVENOUS at 01:03

## 2024-03-28 RX ADMIN — ACETAMINOPHEN 650 MG: 325 TABLET, FILM COATED ORAL at 06:03

## 2024-03-28 RX ADMIN — Medication 10 ML: at 06:03

## 2024-03-28 RX ADMIN — FENTANYL CITRATE 50 MCG: 50 INJECTION, SOLUTION INTRAMUSCULAR; INTRAVENOUS at 08:03

## 2024-03-28 RX ADMIN — OXYCODONE HYDROCHLORIDE 5 MG: 5 TABLET ORAL at 01:03

## 2024-03-28 RX ADMIN — POTASSIUM PHOSPHATE, MONOBASIC AND POTASSIUM PHOSPHATE, DIBASIC 15 MMOL: 224; 236 INJECTION, SOLUTION, CONCENTRATE INTRAVENOUS at 10:03

## 2024-03-28 RX ADMIN — ACETAMINOPHEN 650 MG: 325 TABLET, FILM COATED ORAL at 01:03

## 2024-03-28 RX ADMIN — Medication 10 ML: at 11:03

## 2024-03-28 RX ADMIN — SODIUM CHLORIDE 50 ML/HR: 3 INJECTION, SOLUTION INTRAVENOUS at 12:03

## 2024-03-28 RX ADMIN — Medication 10 ML: at 01:03

## 2024-03-28 RX ADMIN — SENNOSIDES 8.6 MG: 8.6 TABLET, FILM COATED ORAL at 08:03

## 2024-03-28 RX ADMIN — METHOCARBAMOL 500 MG: 100 INJECTION INTRAMUSCULAR; INTRAVENOUS at 09:03

## 2024-03-28 NOTE — PROGRESS NOTES
Hemovac drain removed.  4-0 Prolene suture in placed.  No further drainage.  Patient tolerated the removal of the hemovac drain.
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
## 2024-03-28 NOTE — PROGRESS NOTES
[facility] - [address] [place] [address]  Neurosurgery  Progress Note    Subjective:     Interval History:    POD #1: Craniotomy, for subdural hematoma evacuation- right    Patient remains intubated, but off sedation this AM. Patient opens eyes and tracks. Hemovac put out a total of 50mL of CSF serosanguinous drainage in the past 12 hours. Sodium is 153 this AM while being on 3% saline.        Post-Op Info:  Procedure(s) (LRB):  CRANIOTOMY, FOR SUBDURAL HEMATOMA EVACUATION-RIGHT (Right)  Insertion,central venous access device (Right)   1 Day Post-Op      Medications:  Continuous Infusions:   sodium chloride 0.9% 50 mL/hr at 03/28/24 0900    dexmedeTOMIDine (Precedex) infusion (titrating) Stopped (03/27/24 2049)    fentanyl 45 mcg/hr (03/28/24 0900)     Scheduled Meds:   acetaminophen  650 mg Oral Q4H    bacitracin   Topical (Top) TID    docusate sodium  100 mg Oral BID    famotidine (PF)  20 mg Intravenous BID    levETIRAcetam (Keppra) IV (PEDS and ADULTS)  500 mg Intravenous Q12H    LIDOcaine  1 patch Transdermal Q24H    methocarbamoL  500 mg Intravenous Q8H    mupirocin   Nasal BID    oxyCODONE  5 mg Oral Q6H    polyethylene glycol  17 g Oral BID    potassium phosphate IVPB  15 mmol Intravenous Once    senna  8.6 mg Oral Daily    sodium chloride 0.9%  10 mL Intravenous Q6H     PRN Meds:0.9%  NaCl infusion (for blood administration), 0.9%  NaCl infusion (for blood administration), bisacodyL, calcium gluconate IVPB, calcium gluconate IVPB, calcium gluconate IVPB, etomidate, fentaNYL, fentaNYL, hydrALAZINE, labetaloL, magnesium sulfate IVPB, magnesium sulfate IVPB, melatonin, morphine, ondansetron, potassium chloride **AND** potassium chloride, Flushing PICC/Midline Protocol **AND** sodium chloride 0.9% **AND** sodium chloride 0.9%, sodium phosphate 15 mmol in dextrose 5 % (D5W) 250 mL IVPB     Review of Systems  Objective:     Weight: 55.2 kg (121 lb 11.1 oz)  Body mass index is 19.65 kg/m².  Vital Signs (Most  "Recent):  Temp: 99.4 °F (37.4 °C) (03/28/24 0715)  Pulse: 103 (03/28/24 0915)  Resp: 14 (03/28/24 0900)  BP: 132/70 (03/28/24 0915)  SpO2: 99 % (03/28/24 0915) Vital Signs (24h Range):  Temp:  [37.3 °F (2.9 °C)-100.1 °F (37.8 °C)] 99.4 °F (37.4 °C)  Pulse:  [] 103  Resp:  [14-26] 14  SpO2:  [80 %-100 %] 99 %  BP: ()/(48-79) 132/70  Arterial Line BP: ()/() 121/39     Date 03/28/24 0700 - 03/29/24 0659   Shift 6808-8310 3868-2474 9151-9638 24 Hour Total   INTAKE   I.V.(mL/kg) 191.1(3.5)   191.1(3.5)   IV Piggyback 100   100   Shift Total(mL/kg) 291.1(5.3)   291.1(5.3)   OUTPUT   Urine(mL/kg/hr) 145   145   Shift Total(mL/kg) 145(2.6)   145(2.6)   Weight (kg) 55.2 55.2 55.2 55.2              Vent Mode: A/C  Oxygen Concentration (%):  [30-50] 30  Resp Rate Total:  [24 br/min-26 br/min] 24 br/min  Vt Set:  [450 mL] 450 mL  PEEP/CPAP:  [5 cmH20] 5 cmH20  Mean Airway Pressure:  [9 cmH20] 9 cmH20             NG/OG Tube 03/28/24 0914 nasogastric 16 Fr. Left nostril (Active)            Urethral Catheter 03/26/24 2304 16 Fr. (Active)   Site Assessment Clean;Intact;Dry 03/28/24 0703   Collection Container Urimeter 03/28/24 0703   Securement Method secured to top of thigh w/ adhesive device 03/28/24 0703   Catheter Care Performed yes 03/28/24 0703   Reason for Continuing Urinary Catheterization Critically ill in ICU and requiring hourly monitoring of intake/output 03/28/24 0703   CAUTI Prevention Bundle Securement Device in place with 1" slack;Intact seal between catheter & drainage tubing;Drainage bag/urimeter off the floor;Sheeting clip in use;No dependent loops or kinks;Drainage bag/urimeter not overfilled (<2/3 full);Drainage bag/urimeter below bladder 03/28/24 0703   Output (mL) 40 mL 03/28/24 0806            Drain/Device  03/27/24 0700 frontal region collapsible closed device (Active)   Insertion Site clean and dry;dressing intact 03/28/24 0703   Drainage Characteristics/Odor Sanguineous 03/28/24 " 0703   Drainage Amount Small 03/28/24 0703   General Output (mL) 60 03/28/24 0450       Neurosurgery Physical Exam:  Open eyes spontaneously  Right  on command  Move all extremities  Surgical dressing is clean, dry, intact  Hemovac: 50mL CSF serosanguinous drainage in the past 12 hours      Significant Labs:  Recent Labs   Lab 03/27/24  0017 03/27/24  0601 03/27/24  1213 03/27/24  2340 03/28/24  0054 03/28/24  0611    140  140   < > 150* 139 153*   K 3.2* 3.6  --   --  3.5  --    CO2 23 20*  --   --  16*  --    BUN 20.4 19.2  --   --  21.6  --    CREATININE 0.84 0.87  --   --  0.88  --    CALCIUM 8.9 8.6*  --   --  9.3  --     < > = values in this interval not displayed.     Recent Labs   Lab 03/27/24  0017 03/27/24  0601 03/28/24  0056   WBC 16.70* 15.82* 12.83*   HGB 11.7* 9.4* 7.6*   HCT 34.9* 29.1* 23.6*    244 183     Recent Labs   Lab 03/26/24  2203 03/27/24  0601   INR 1.2 1.2     Microbiology Results (last 7 days)       ** No results found for the last 168 hours. **              Assessment/Plan:     Active Diagnoses:    Diagnosis Date Noted POA    PRINCIPAL PROBLEM:  Traumatic subdural hematoma (SDH) [S06.5XAA] 03/27/2024 Yes    Acute hypoxemic respiratory failure [J96.01] 03/27/2024 No    Aspiration pneumonia due to gastric secretions [J69.0] 03/27/2024 No    Closed fracture of multiple ribs [S22.49XA] 03/27/2024 Yes    Closed nondisplaced fracture of sternal end of right clavicle [S42.017A] 03/27/2024 Yes      Problems Resolved During this Admission:   Remain in TICU  Q1H Neuro checks  HOB 30 degrees  Hemovac to full suction, monitor output  3/28 Completed 24H Ancef  Keppra 500mg BID x 7 days  D/C 3% saline  Fall precaution  SCDs    ELIAZAR Delacruz- BC  Neurosurgery  [facility] - [address] [place] [address]
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
## 2024-03-29 PROBLEM — D62 ACUTE BLOOD LOSS ANEMIA: Status: ACTIVE | Noted: 2024-03-29

## 2024-03-29 LAB
ABO + RH BLD: NORMAL
ABO + RH BLD: NORMAL
ALBUMIN SERPL-MCNC: 2.6 G/DL (ref 3.4–4.8)
ALBUMIN/GLOB SERPL: 1.1 RATIO (ref 1.1–2)
ALLENS TEST BLOOD GAS (OHS): ABNORMAL
ALLENS TEST BLOOD GAS (OHS): ABNORMAL
ALP SERPL-CCNC: 44 UNIT/L (ref 40–150)
ALT SERPL-CCNC: 20 UNIT/L (ref 0–55)
ANION GAP SERPL CALC-SCNC: 5 MEQ/L
AST SERPL-CCNC: 24 UNIT/L (ref 5–34)
BASE EXCESS BLD CALC-SCNC: 2.6 MMOL/L (ref -2–2)
BASE EXCESS BLD CALC-SCNC: 3.1 MMOL/L (ref -2–2)
BASOPHILS # BLD AUTO: 0.02 X10(3)/MCL
BASOPHILS NFR BLD AUTO: 0.2 %
BILIRUB SERPL-MCNC: 0.4 MG/DL
BLD PROD TYP BPU: NORMAL
BLD PROD TYP BPU: NORMAL
BLOOD GAS SAMPLE TYPE (OHS): ABNORMAL
BLOOD GAS SAMPLE TYPE (OHS): ABNORMAL
BLOOD UNIT EXPIRATION DATE: NORMAL
BLOOD UNIT EXPIRATION DATE: NORMAL
BLOOD UNIT TYPE CODE: 5100
BLOOD UNIT TYPE CODE: 5100
BUN SERPL-MCNC: 17.1 MG/DL (ref 8.4–25.7)
BUN SERPL-MCNC: 18.1 MG/DL (ref 8.4–25.7)
CA-I BLD-SCNC: 1.12 MMOL/L (ref 1.12–1.23)
CA-I BLD-SCNC: 1.15 MMOL/L (ref 1.12–1.23)
CALCIUM SERPL-MCNC: 8.1 MG/DL (ref 8.8–10)
CALCIUM SERPL-MCNC: 8.2 MG/DL (ref 8.8–10)
CHLORIDE SERPL-SCNC: 123 MMOL/L (ref 98–107)
CHLORIDE SERPL-SCNC: 126 MMOL/L (ref 98–107)
CO2 BLDA-SCNC: 28 MMOL/L
CO2 BLDA-SCNC: 28.5 MMOL/L
CO2 SERPL-SCNC: 23 MMOL/L (ref 23–31)
CO2 SERPL-SCNC: 25 MMOL/L (ref 23–31)
COHGB MFR BLDA: 1.6 % (ref 0.5–1.5)
COHGB MFR BLDA: 2.2 % (ref 0.5–1.5)
CREAT SERPL-MCNC: 0.75 MG/DL (ref 0.73–1.18)
CREAT SERPL-MCNC: 0.78 MG/DL (ref 0.73–1.18)
CREAT/UREA NIT SERPL: 24
CROSSMATCH INTERPRETATION: NORMAL
CROSSMATCH INTERPRETATION: NORMAL
DISPENSE STATUS: NORMAL
DISPENSE STATUS: NORMAL
DRAWN BY BLOOD GAS (OHS): ABNORMAL
DRAWN BY BLOOD GAS (OHS): ABNORMAL
EOSINOPHIL # BLD AUTO: 0 X10(3)/MCL (ref 0–0.9)
EOSINOPHIL NFR BLD AUTO: 0 %
ERYTHROCYTE [DISTWIDTH] IN BLOOD BY AUTOMATED COUNT: 15.6 % (ref 11.5–17)
GFR SERPLBLD CREATININE-BSD FMLA CKD-EPI: >60 MLS/MIN/1.73/M2
GFR SERPLBLD CREATININE-BSD FMLA CKD-EPI: >60 MLS/MIN/1.73/M2
GLOBULIN SER-MCNC: 2.4 GM/DL (ref 2.4–3.5)
GLUCOSE SERPL-MCNC: 137 MG/DL (ref 82–115)
GLUCOSE SERPL-MCNC: 149 MG/DL (ref 82–115)
HCO3 BLDA-SCNC: 26.9 MMOL/L (ref 22–26)
HCO3 BLDA-SCNC: 27.2 MMOL/L (ref 22–26)
HCT VFR BLD AUTO: 20.9 % (ref 42–52)
HCT VFR BLD AUTO: 26.9 % (ref 42–52)
HGB BLD-MCNC: 6.4 G/DL (ref 14–18)
HGB BLD-MCNC: 8.7 G/DL (ref 14–18)
IMM GRANULOCYTES # BLD AUTO: 0.06 X10(3)/MCL (ref 0–0.04)
IMM GRANULOCYTES NFR BLD AUTO: 0.6 %
INHALED O2 CONCENTRATION: 30 %
INHALED O2 CONCENTRATION: 30 %
LYMPHOCYTES # BLD AUTO: 0.94 X10(3)/MCL (ref 0.6–4.6)
LYMPHOCYTES NFR BLD AUTO: 8.8 %
MAGNESIUM SERPL-MCNC: 2.3 MG/DL (ref 1.6–2.6)
MCH RBC QN AUTO: 29.5 PG (ref 27–31)
MCHC RBC AUTO-ENTMCNC: 30.6 G/DL (ref 33–36)
MCV RBC AUTO: 96.3 FL (ref 80–94)
MECH RR (OHS): 24 B/MIN
METHGB MFR BLDA: 0.5 % (ref 0.4–1.5)
METHGB MFR BLDA: 1.5 % (ref 0.4–1.5)
MODE (OHS): ABNORMAL
MODE (OHS): AC
MONOCYTES # BLD AUTO: 0.93 X10(3)/MCL (ref 0.1–1.3)
MONOCYTES NFR BLD AUTO: 8.7 %
NEUTROPHILS # BLD AUTO: 8.71 X10(3)/MCL (ref 2.1–9.2)
NEUTROPHILS NFR BLD AUTO: 81.7 %
NRBC BLD AUTO-RTO: 0 %
O2 HB BLOOD GAS (OHS): 96.2 % (ref 94–97)
O2 HB BLOOD GAS (OHS): 97.7 % (ref 94–97)
OXYGEN DEVICE BLOOD GAS (OHS): ABNORMAL
OXYGEN DEVICE BLOOD GAS (OHS): ABNORMAL
OXYHGB MFR BLDA: 8.5 G/DL (ref 12–16)
OXYHGB MFR BLDA: 9.2 G/DL (ref 12–16)
PCO2 BLDA: 37 MMHG (ref 35–45)
PCO2 BLDA: 41 MMHG (ref 35–45)
PEEP RESPIRATORY: 5 CMH2O
PEEP RESPIRATORY: 5 CMH2O
PH BLDA: 7.43 [PH] (ref 7.35–7.45)
PH BLDA: 7.47 [PH] (ref 7.35–7.45)
PHOSPHATE SERPL-MCNC: 1.4 MG/DL (ref 2.3–4.7)
PLATELET # BLD AUTO: 143 X10(3)/MCL (ref 130–400)
PMV BLD AUTO: 10 FL (ref 7.4–10.4)
PO2 BLDA: 123 MMHG (ref 80–100)
PO2 BLDA: 131 MMHG (ref 80–100)
POTASSIUM BLOOD GAS (OHS): 3.4 MMOL/L (ref 3.5–5)
POTASSIUM BLOOD GAS (OHS): 3.6 MMOL/L (ref 3.5–5)
POTASSIUM SERPL-SCNC: 3.5 MMOL/L (ref 3.5–5.1)
POTASSIUM SERPL-SCNC: 3.8 MMOL/L (ref 3.5–5.1)
PROT SERPL-MCNC: 5 GM/DL (ref 5.8–7.6)
PS (OHS): 10 CMH2O
RBC # BLD AUTO: 2.17 X10(6)/MCL (ref 4.7–6.1)
SAMPLE SITE BLOOD GAS (OHS): ABNORMAL
SAMPLE SITE BLOOD GAS (OHS): ABNORMAL
SAO2 % BLDA: 98.9 %
SAO2 % BLDA: 99.1 %
SODIUM BLOOD GAS (OHS): 148 MMOL/L (ref 137–145)
SODIUM BLOOD GAS (OHS): 152 MMOL/L (ref 137–145)
SODIUM SERPL-SCNC: 153 MMOL/L (ref 136–145)
SODIUM SERPL-SCNC: 156 MMOL/L (ref 136–145)
SPONT+MECH VT ON VENT: 450 ML
UNIT NUMBER: NORMAL
UNIT NUMBER: NORMAL
WBC # SPEC AUTO: 10.66 X10(3)/MCL (ref 4.5–11.5)

## 2024-03-29 PROCEDURE — 86923 COMPATIBILITY TEST ELECTRIC: CPT | Performed by: NURSE PRACTITIONER

## 2024-03-29 PROCEDURE — 25000003 PHARM REV CODE 250: Performed by: NURSE PRACTITIONER

## 2024-03-29 PROCEDURE — 63600175 PHARM REV CODE 636 W HCPCS: Performed by: NURSE PRACTITIONER

## 2024-03-29 PROCEDURE — 20800000 HC ICU TRAUMA

## 2024-03-29 PROCEDURE — 27100171 HC OXYGEN HIGH FLOW UP TO 24 HOURS

## 2024-03-29 PROCEDURE — 94760 N-INVAS EAR/PLS OXIMETRY 1: CPT | Mod: XB

## 2024-03-29 PROCEDURE — 25000242 PHARM REV CODE 250 ALT 637 W/ HCPCS: Performed by: SURGERY

## 2024-03-29 PROCEDURE — 83735 ASSAY OF MAGNESIUM: CPT | Performed by: NURSE PRACTITIONER

## 2024-03-29 PROCEDURE — 36430 TRANSFUSION BLD/BLD COMPNT: CPT

## 2024-03-29 PROCEDURE — 82803 BLOOD GASES ANY COMBINATION: CPT

## 2024-03-29 PROCEDURE — 25000003 PHARM REV CODE 250: Performed by: SURGERY

## 2024-03-29 PROCEDURE — 84100 ASSAY OF PHOSPHORUS: CPT | Performed by: NURSE PRACTITIONER

## 2024-03-29 PROCEDURE — P9016 RBC LEUKOCYTES REDUCED: HCPCS | Performed by: NURSE PRACTITIONER

## 2024-03-29 PROCEDURE — 63600175 PHARM REV CODE 636 W HCPCS: Performed by: SURGERY

## 2024-03-29 PROCEDURE — 37799 UNLISTED PX VASCULAR SURGERY: CPT

## 2024-03-29 PROCEDURE — 94010 BREATHING CAPACITY TEST: CPT

## 2024-03-29 PROCEDURE — 30233N1 TRANSFUSION OF NONAUTOLOGOUS RED BLOOD CELLS INTO PERIPHERAL VEIN, PERCUTANEOUS APPROACH: ICD-10-PCS | Performed by: SURGERY

## 2024-03-29 PROCEDURE — 99900035 HC TECH TIME PER 15 MIN (STAT)

## 2024-03-29 PROCEDURE — A4216 STERILE WATER/SALINE, 10 ML: HCPCS | Performed by: NURSE PRACTITIONER

## 2024-03-29 PROCEDURE — 80053 COMPREHEN METABOLIC PANEL: CPT | Performed by: NURSE PRACTITIONER

## 2024-03-29 PROCEDURE — 94640 AIRWAY INHALATION TREATMENT: CPT

## 2024-03-29 PROCEDURE — 85014 HEMATOCRIT: CPT | Performed by: SURGERY

## 2024-03-29 PROCEDURE — 85018 HEMOGLOBIN: CPT | Performed by: SURGERY

## 2024-03-29 PROCEDURE — 85025 COMPLETE CBC W/AUTO DIFF WBC: CPT | Performed by: NURSE PRACTITIONER

## 2024-03-29 RX ORDER — GUAIFENESIN 100 MG/5ML
200 SOLUTION ORAL EVERY 8 HOURS
Status: DISCONTINUED | OUTPATIENT
Start: 2024-03-29 | End: 2024-03-31

## 2024-03-29 RX ORDER — ENOXAPARIN SODIUM 100 MG/ML
30 INJECTION SUBCUTANEOUS EVERY 12 HOURS
Status: DISCONTINUED | OUTPATIENT
Start: 2024-03-30 | End: 2024-04-01

## 2024-03-29 RX ORDER — IPRATROPIUM BROMIDE AND ALBUTEROL SULFATE 2.5; .5 MG/3ML; MG/3ML
3 SOLUTION RESPIRATORY (INHALATION) EVERY 6 HOURS
Status: DISCONTINUED | OUTPATIENT
Start: 2024-03-29 | End: 2024-04-15

## 2024-03-29 RX ORDER — OXYCODONE HYDROCHLORIDE 5 MG/1
5 TABLET ORAL EVERY 6 HOURS PRN
Status: DISCONTINUED | OUTPATIENT
Start: 2024-03-29 | End: 2024-03-31

## 2024-03-29 RX ORDER — TRAMADOL HYDROCHLORIDE 50 MG/1
50 TABLET ORAL EVERY 6 HOURS PRN
Status: DISCONTINUED | OUTPATIENT
Start: 2024-03-29 | End: 2024-04-01

## 2024-03-29 RX ORDER — TRAMADOL HYDROCHLORIDE 50 MG/1
50 TABLET ORAL EVERY 12 HOURS
Status: DISCONTINUED | OUTPATIENT
Start: 2024-03-29 | End: 2024-03-29

## 2024-03-29 RX ORDER — HYDROCODONE BITARTRATE AND ACETAMINOPHEN 500; 5 MG/1; MG/1
TABLET ORAL
Status: DISCONTINUED | OUTPATIENT
Start: 2024-03-29 | End: 2024-03-30

## 2024-03-29 RX ORDER — SODIUM CHLORIDE, SODIUM LACTATE, POTASSIUM CHLORIDE, CALCIUM CHLORIDE 600; 310; 30; 20 MG/100ML; MG/100ML; MG/100ML; MG/100ML
INJECTION, SOLUTION INTRAVENOUS CONTINUOUS
Status: DISCONTINUED | OUTPATIENT
Start: 2024-03-29 | End: 2024-03-29

## 2024-03-29 RX ORDER — PROPRANOLOL HYDROCHLORIDE 10 MG/1
10 TABLET ORAL 2 TIMES DAILY
Status: DISCONTINUED | OUTPATIENT
Start: 2024-03-29 | End: 2024-03-30

## 2024-03-29 RX ORDER — LIDOCAINE 50 MG/G
2 PATCH TOPICAL
Status: DISCONTINUED | OUTPATIENT
Start: 2024-03-29 | End: 2024-04-29

## 2024-03-29 RX ORDER — FENTANYL CITRATE 50 UG/ML
50 INJECTION, SOLUTION INTRAMUSCULAR; INTRAVENOUS
Status: DISCONTINUED | OUTPATIENT
Start: 2024-03-29 | End: 2024-03-31

## 2024-03-29 RX ORDER — FERROUS SULFATE 300 MG/5ML
300 LIQUID (ML) ORAL DAILY
Status: DISPENSED | OUTPATIENT
Start: 2024-03-29 | End: 2024-04-01

## 2024-03-29 RX ADMIN — GABAPENTIN 400 MG: 300 CAPSULE ORAL at 02:03

## 2024-03-29 RX ADMIN — GABAPENTIN 300 MG: 300 CAPSULE ORAL at 08:03

## 2024-03-29 RX ADMIN — METHOCARBAMOL 500 MG: 100 INJECTION INTRAMUSCULAR; INTRAVENOUS at 02:03

## 2024-03-29 RX ADMIN — SODIUM CHLORIDE, POTASSIUM CHLORIDE, SODIUM LACTATE AND CALCIUM CHLORIDE: 600; 310; 30; 20 INJECTION, SOLUTION INTRAVENOUS at 09:03

## 2024-03-29 RX ADMIN — Medication 10 ML: at 06:03

## 2024-03-29 RX ADMIN — ACETAMINOPHEN 650 MG: 325 TABLET, FILM COATED ORAL at 06:03

## 2024-03-29 RX ADMIN — POTASSIUM PHOSPHATE, MONOBASIC AND POTASSIUM PHOSPHATE, DIBASIC 20 MMOL: 224; 236 INJECTION, SOLUTION, CONCENTRATE INTRAVENOUS at 05:03

## 2024-03-29 RX ADMIN — ACETAMINOPHEN 650 MG: 325 TABLET, FILM COATED ORAL at 09:03

## 2024-03-29 RX ADMIN — SENNOSIDES 8.6 MG: 8.6 TABLET, FILM COATED ORAL at 08:03

## 2024-03-29 RX ADMIN — GUAIFENESIN 200 MG: 200 SOLUTION ORAL at 09:03

## 2024-03-29 RX ADMIN — DOCUSATE SODIUM 100 MG: 100 CAPSULE, LIQUID FILLED ORAL at 08:03

## 2024-03-29 RX ADMIN — METHOCARBAMOL 500 MG: 100 INJECTION INTRAMUSCULAR; INTRAVENOUS at 05:03

## 2024-03-29 RX ADMIN — BACITRACIN: 500 OINTMENT TOPICAL at 08:03

## 2024-03-29 RX ADMIN — THERA TABS 1 TABLET: TAB at 08:03

## 2024-03-29 RX ADMIN — BACITRACIN: 500 OINTMENT TOPICAL at 02:03

## 2024-03-29 RX ADMIN — OXYCODONE HYDROCHLORIDE 5 MG: 5 TABLET ORAL at 08:03

## 2024-03-29 RX ADMIN — LIDOCAINE 5% 2 PATCH: 700 PATCH TOPICAL at 12:03

## 2024-03-29 RX ADMIN — Medication 10 ML: at 12:03

## 2024-03-29 RX ADMIN — ACETAMINOPHEN 650 MG: 325 TABLET, FILM COATED ORAL at 01:03

## 2024-03-29 RX ADMIN — LEVETIRACETAM 500 MG: 100 INJECTION, SOLUTION INTRAVENOUS at 08:03

## 2024-03-29 RX ADMIN — HYDRALAZINE HYDROCHLORIDE 10 MG: 20 INJECTION INTRAMUSCULAR; INTRAVENOUS at 03:03

## 2024-03-29 RX ADMIN — OXYCODONE HYDROCHLORIDE 5 MG: 5 TABLET ORAL at 05:03

## 2024-03-29 RX ADMIN — ACETAMINOPHEN 650 MG: 325 TABLET, FILM COATED ORAL at 05:03

## 2024-03-29 RX ADMIN — POLYETHYLENE GLYCOL 3350 17 G: 17 POWDER, FOR SOLUTION ORAL at 08:03

## 2024-03-29 RX ADMIN — ACETAMINOPHEN 650 MG: 325 TABLET, FILM COATED ORAL at 02:03

## 2024-03-29 RX ADMIN — PROPRANOLOL HYDROCHLORIDE 10 MG: 10 TABLET ORAL at 09:03

## 2024-03-29 RX ADMIN — MUPIROCIN: 20 OINTMENT TOPICAL at 08:03

## 2024-03-29 RX ADMIN — OXYCODONE HYDROCHLORIDE 5 MG: 5 TABLET ORAL at 12:03

## 2024-03-29 RX ADMIN — IPRATROPIUM BROMIDE AND ALBUTEROL SULFATE 3 ML: 2.5; .5 SOLUTION RESPIRATORY (INHALATION) at 07:03

## 2024-03-29 RX ADMIN — FAMOTIDINE 20 MG: 10 INJECTION, SOLUTION INTRAVENOUS at 08:03

## 2024-03-29 RX ADMIN — IPRATROPIUM BROMIDE AND ALBUTEROL SULFATE 3 ML: 2.5; .5 SOLUTION RESPIRATORY (INHALATION) at 01:03

## 2024-03-29 RX ADMIN — FERROUS SULFATE 300 MG: 300 SOLUTION ORAL at 02:03

## 2024-03-29 RX ADMIN — PROPRANOLOL HYDROCHLORIDE 10 MG: 10 TABLET ORAL at 08:03

## 2024-03-29 RX ADMIN — FENTANYL CITRATE 50 MCG: 50 INJECTION, SOLUTION INTRAMUSCULAR; INTRAVENOUS at 05:03

## 2024-03-29 RX ADMIN — LEVETIRACETAM 500 MG: 100 INJECTION, SOLUTION INTRAVENOUS at 09:03

## 2024-03-29 RX ADMIN — GABAPENTIN 400 MG: 300 CAPSULE ORAL at 08:03

## 2024-03-29 NOTE — NURSING
Nurses Note -- 4 Eyes      3/29/2024   3:08 AM      Skin assessed during: Daily Assessment      [] No Altered Skin Integrity Present    [x]Prevention Measures Documented      [x] Yes- Altered Skin Integrity Present or Discovered   [] LDA Added if Not in Epic (Describe Wound)   [] New Altered Skin Integrity was Present on Admit and Documented in LDA   [] Wound Image Taken    Wound Care Consulted? No    Attending Nurse:  Jeaneth Dawson RN/Staff Member:  GAGANDEEP Beal

## 2024-03-29 NOTE — PLAN OF CARE
Problem: Adult Inpatient Plan of Care  Goal: Plan of Care Review  Outcome: Ongoing, Progressing  Goal: Patient-Specific Goal (Individualized)  Outcome: Ongoing, Progressing  Goal: Absence of Hospital-Acquired Illness or Injury  Outcome: Ongoing, Progressing  Goal: Optimal Comfort and Wellbeing  Outcome: Ongoing, Progressing  Goal: Readiness for Transition of Care  Outcome: Ongoing, Progressing     Problem: Infection  Goal: Absence of Infection Signs and Symptoms  Outcome: Ongoing, Progressing     Problem: Impaired Wound Healing  Goal: Optimal Wound Healing  Outcome: Ongoing, Progressing     Problem: Fall Injury Risk  Goal: Absence of Fall and Fall-Related Injury  Outcome: Ongoing, Progressing     Problem: Restraint, Nonbehavioral (Nonviolent)  Goal: Absence of Harm or Injury  Outcome: Ongoing, Progressing

## 2024-03-29 NOTE — CONSULTS
POD2 R craniotomy for acute SDH  Intubated, off sedation  OE spontaneously, FC x 4  Incision CDI  - Continue serial neurological exams, Q2H neuro checks  - WTE as able  - SBP < 160  - Keppra 500 BID x 7 days  - No activity restrictions  - SCDs, OK for Matt De La Paz MD  Neurosurgery
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
## 2024-03-29 NOTE — NURSING
Nurses Note -- 4 Eyes      3/29/2024   5:30 PM      Skin assessed during: Daily Assessment      [x] No Altered Skin Integrity Present    [x]Prevention Measures Documented      [] Yes- Altered Skin Integrity Present or Discovered   [] LDA Added if Not in Epic (Describe Wound)   [] New Altered Skin Integrity was Present on Admit and Documented in LDA   [] Wound Image Taken    Wound Care Consulted? No    Attending Nurse:  GAGANDEEP Jacobson    Second RN/Staff Member:  GAGANDEEP Walsh

## 2024-03-30 LAB
ALBUMIN SERPL-MCNC: 2.7 G/DL (ref 3.4–4.8)
ALBUMIN/GLOB SERPL: 1 RATIO (ref 1.1–2)
ALLENS TEST BLOOD GAS (OHS): YES
ALP SERPL-CCNC: 58 UNIT/L (ref 40–150)
ALT SERPL-CCNC: 17 UNIT/L (ref 0–55)
ANION GAP SERPL CALC-SCNC: 9 MEQ/L
AST SERPL-CCNC: 25 UNIT/L (ref 5–34)
BASE EXCESS BLD CALC-SCNC: 4.2 MMOL/L (ref -2–2)
BASOPHILS # BLD AUTO: 0.04 X10(3)/MCL
BASOPHILS NFR BLD AUTO: 0.3 %
BILIRUB SERPL-MCNC: 0.7 MG/DL
BLOOD GAS SAMPLE TYPE (OHS): ABNORMAL
BLOOD GAS SAMPLE TYPE (OHS): ABNORMAL
BUN SERPL-MCNC: 17 MG/DL (ref 8.4–25.7)
BUN SERPL-MCNC: 19.4 MG/DL (ref 8.4–25.7)
CA-I BLD-SCNC: 1.07 MMOL/L (ref 1.12–1.23)
CA-I BLD-SCNC: 1.2 MMOL/L (ref 1.12–1.23)
CALCIUM SERPL-MCNC: 8.3 MG/DL (ref 8.8–10)
CALCIUM SERPL-MCNC: 8.7 MG/DL (ref 8.8–10)
CHLORIDE SERPL-SCNC: 120 MMOL/L (ref 98–107)
CHLORIDE SERPL-SCNC: 120 MMOL/L (ref 98–107)
CO2 BLDA-SCNC: 30.6 MMOL/L
CO2 SERPL-SCNC: 21 MMOL/L (ref 23–31)
CO2 SERPL-SCNC: 25 MMOL/L (ref 23–31)
COHGB MFR BLDA: 2.1 % (ref 0.5–1.5)
CREAT SERPL-MCNC: 0.69 MG/DL (ref 0.73–1.18)
CREAT SERPL-MCNC: 0.77 MG/DL (ref 0.73–1.18)
CREAT/UREA NIT SERPL: 25
DRAWN BY BLOOD GAS (OHS): ABNORMAL
DRAWN BY BLOOD GAS (OHS): ABNORMAL
EOSINOPHIL # BLD AUTO: 0.03 X10(3)/MCL (ref 0–0.9)
EOSINOPHIL NFR BLD AUTO: 0.2 %
ERYTHROCYTE [DISTWIDTH] IN BLOOD BY AUTOMATED COUNT: 16.1 % (ref 11.5–17)
GFR SERPLBLD CREATININE-BSD FMLA CKD-EPI: >60 MLS/MIN/1.73/M2
GFR SERPLBLD CREATININE-BSD FMLA CKD-EPI: >60 MLS/MIN/1.73/M2
GLOBULIN SER-MCNC: 2.8 GM/DL (ref 2.4–3.5)
GLUCOSE SERPL-MCNC: 125 MG/DL (ref 82–115)
GLUCOSE SERPL-MCNC: 174 MG/DL (ref 82–115)
HCO3 BLDA-SCNC: 29.2 MMOL/L (ref 22–26)
HCT VFR BLD AUTO: 30.9 % (ref 42–52)
HGB BLD-MCNC: 9.7 G/DL (ref 14–18)
IMM GRANULOCYTES # BLD AUTO: 0.09 X10(3)/MCL (ref 0–0.04)
IMM GRANULOCYTES NFR BLD AUTO: 0.7 %
LPM (OHS): 3
LYMPHOCYTES # BLD AUTO: 1.75 X10(3)/MCL (ref 0.6–4.6)
LYMPHOCYTES NFR BLD AUTO: 13.7 %
MAGNESIUM SERPL-MCNC: 2.2 MG/DL (ref 1.6–2.6)
MAGNESIUM SERPL-MCNC: 2.3 MG/DL (ref 1.6–2.6)
MCH RBC QN AUTO: 30.5 PG (ref 27–31)
MCHC RBC AUTO-ENTMCNC: 31.4 G/DL (ref 33–36)
MCV RBC AUTO: 97.2 FL (ref 80–94)
METHGB MFR BLDA: 1.4 % (ref 0.4–1.5)
MONOCYTES # BLD AUTO: 0.85 X10(3)/MCL (ref 0.1–1.3)
MONOCYTES NFR BLD AUTO: 6.6 %
NEUTROPHILS # BLD AUTO: 10.04 X10(3)/MCL (ref 2.1–9.2)
NEUTROPHILS NFR BLD AUTO: 78.5 %
NRBC BLD AUTO-RTO: 0 %
O2 HB BLOOD GAS (OHS): 95.1 % (ref 94–97)
OXYGEN DEVICE BLOOD GAS (OHS): ABNORMAL
OXYHGB MFR BLDA: 8.6 G/DL (ref 12–16)
PCO2 BLDA: 45 MMHG (ref 35–45)
PCO2 BLDA: ABNORMAL MM[HG]
PH BLDA: 7.42 [PH] (ref 7.35–7.45)
PH BLDA: ABNORMAL [PH]
PHOSPHATE SERPL-MCNC: 1 MG/DL (ref 2.3–4.7)
PHOSPHATE SERPL-MCNC: 1.2 MG/DL (ref 2.3–4.7)
PLATELET # BLD AUTO: 152 X10(3)/MCL (ref 130–400)
PLATELETS.RETICULATED NFR BLD AUTO: 3.7 % (ref 0.9–11.2)
PLT APH: NORMAL
PMV BLD AUTO: 9.5 FL (ref 7.4–10.4)
PO2 BLDA: 81 MMHG (ref 80–100)
PO2 BLDA: ABNORMAL MM[HG]
POTASSIUM BLOOD GAS (OHS): 3.6 MMOL/L (ref 3.5–5)
POTASSIUM SERPL-SCNC: 3.9 MMOL/L (ref 3.5–5.1)
POTASSIUM SERPL-SCNC: 3.9 MMOL/L (ref 3.5–5.1)
PROT SERPL-MCNC: 5.5 GM/DL (ref 5.8–7.6)
RBC # BLD AUTO: 3.18 X10(6)/MCL (ref 4.7–6.1)
SAMPLE SITE BLOOD GAS (OHS): ABNORMAL
SAMPLE SITE BLOOD GAS (OHS): ABNORMAL
SAO2 % BLDA: 96.1 %
SODIUM BLOOD GAS (OHS): 150 MMOL/L (ref 137–145)
SODIUM SERPL-SCNC: 150 MMOL/L (ref 136–145)
SODIUM SERPL-SCNC: 154 MMOL/L (ref 136–145)
WBC # SPEC AUTO: 12.8 X10(3)/MCL (ref 4.5–11.5)

## 2024-03-30 PROCEDURE — 82803 BLOOD GASES ANY COMBINATION: CPT

## 2024-03-30 PROCEDURE — 80053 COMPREHEN METABOLIC PANEL: CPT | Performed by: NURSE PRACTITIONER

## 2024-03-30 PROCEDURE — 25000003 PHARM REV CODE 250: Performed by: SURGERY

## 2024-03-30 PROCEDURE — 63600175 PHARM REV CODE 636 W HCPCS: Performed by: SURGERY

## 2024-03-30 PROCEDURE — 85025 COMPLETE CBC W/AUTO DIFF WBC: CPT | Performed by: NURSE PRACTITIONER

## 2024-03-30 PROCEDURE — 84100 ASSAY OF PHOSPHORUS: CPT | Performed by: NURSE PRACTITIONER

## 2024-03-30 PROCEDURE — 93005 ELECTROCARDIOGRAM TRACING: CPT

## 2024-03-30 PROCEDURE — 99024 POSTOP FOLLOW-UP VISIT: CPT | Mod: ,,, | Performed by: NEUROLOGICAL SURGERY

## 2024-03-30 PROCEDURE — 99900031 HC PATIENT EDUCATION (STAT)

## 2024-03-30 PROCEDURE — 25000003 PHARM REV CODE 250

## 2024-03-30 PROCEDURE — 93010 ELECTROCARDIOGRAM REPORT: CPT | Mod: ,,, | Performed by: INTERNAL MEDICINE

## 2024-03-30 PROCEDURE — 97167 OT EVAL HIGH COMPLEX 60 MIN: CPT

## 2024-03-30 PROCEDURE — 94760 N-INVAS EAR/PLS OXIMETRY 1: CPT

## 2024-03-30 PROCEDURE — 94664 DEMO&/EVAL PT USE INHALER: CPT

## 2024-03-30 PROCEDURE — 37799 UNLISTED PX VASCULAR SURGERY: CPT

## 2024-03-30 PROCEDURE — 63600175 PHARM REV CODE 636 W HCPCS: Performed by: NURSE PRACTITIONER

## 2024-03-30 PROCEDURE — 97163 PT EVAL HIGH COMPLEX 45 MIN: CPT

## 2024-03-30 PROCEDURE — 92610 EVALUATE SWALLOWING FUNCTION: CPT

## 2024-03-30 PROCEDURE — 94667 MNPJ CHEST WALL 1ST: CPT

## 2024-03-30 PROCEDURE — 25000003 PHARM REV CODE 250: Performed by: NURSE PRACTITIONER

## 2024-03-30 PROCEDURE — 83735 ASSAY OF MAGNESIUM: CPT | Performed by: NURSE PRACTITIONER

## 2024-03-30 PROCEDURE — 27000646 HC AEROBIKA DEVICE

## 2024-03-30 PROCEDURE — 94640 AIRWAY INHALATION TREATMENT: CPT

## 2024-03-30 PROCEDURE — 20800000 HC ICU TRAUMA

## 2024-03-30 PROCEDURE — 99900035 HC TECH TIME PER 15 MIN (STAT)

## 2024-03-30 PROCEDURE — 83735 ASSAY OF MAGNESIUM: CPT | Performed by: SURGERY

## 2024-03-30 PROCEDURE — 36600 WITHDRAWAL OF ARTERIAL BLOOD: CPT

## 2024-03-30 PROCEDURE — 84100 ASSAY OF PHOSPHORUS: CPT | Performed by: SURGERY

## 2024-03-30 PROCEDURE — 27000221 HC OXYGEN, UP TO 24 HOURS

## 2024-03-30 PROCEDURE — 25000242 PHARM REV CODE 250 ALT 637 W/ HCPCS: Performed by: SURGERY

## 2024-03-30 RX ORDER — MORPHINE SULFATE 4 MG/ML
1 INJECTION, SOLUTION INTRAMUSCULAR; INTRAVENOUS ONCE
Status: DISCONTINUED | OUTPATIENT
Start: 2024-03-30 | End: 2024-04-01

## 2024-03-30 RX ORDER — LORAZEPAM 1 MG/1
1 TABLET ORAL EVERY 6 HOURS PRN
Status: DISCONTINUED | OUTPATIENT
Start: 2024-03-30 | End: 2024-04-01

## 2024-03-30 RX ORDER — DOXAZOSIN 1 MG/1
1 TABLET ORAL DAILY
Status: DISCONTINUED | OUTPATIENT
Start: 2024-03-30 | End: 2024-04-15

## 2024-03-30 RX ORDER — PROPRANOLOL HYDROCHLORIDE 20 MG/1
20 TABLET ORAL 2 TIMES DAILY
Status: DISCONTINUED | OUTPATIENT
Start: 2024-03-30 | End: 2024-04-02

## 2024-03-30 RX ORDER — FUROSEMIDE 10 MG/ML
10 INJECTION INTRAMUSCULAR; INTRAVENOUS ONCE
Status: COMPLETED | OUTPATIENT
Start: 2024-03-30 | End: 2024-03-30

## 2024-03-30 RX ADMIN — OXYCODONE HYDROCHLORIDE 5 MG: 5 TABLET ORAL at 01:03

## 2024-03-30 RX ADMIN — THERA TABS 1 TABLET: TAB at 09:03

## 2024-03-30 RX ADMIN — BACITRACIN: 500 OINTMENT TOPICAL at 02:03

## 2024-03-30 RX ADMIN — LEVETIRACETAM 500 MG: 100 INJECTION, SOLUTION INTRAVENOUS at 09:03

## 2024-03-30 RX ADMIN — LORAZEPAM 1 MG: 1 TABLET ORAL at 03:03

## 2024-03-30 RX ADMIN — POTASSIUM PHOSPHATE, MONOBASIC AND POTASSIUM PHOSPHATE, DIBASIC 20 MMOL: 224; 236 INJECTION, SOLUTION, CONCENTRATE INTRAVENOUS at 04:03

## 2024-03-30 RX ADMIN — IPRATROPIUM BROMIDE AND ALBUTEROL SULFATE 3 ML: 2.5; .5 SOLUTION RESPIRATORY (INHALATION) at 07:03

## 2024-03-30 RX ADMIN — ACETAMINOPHEN 650 MG: 325 TABLET, FILM COATED ORAL at 09:03

## 2024-03-30 RX ADMIN — LEVETIRACETAM 500 MG: 100 INJECTION, SOLUTION INTRAVENOUS at 08:03

## 2024-03-30 RX ADMIN — ENOXAPARIN SODIUM 30 MG: 30 INJECTION SUBCUTANEOUS at 08:03

## 2024-03-30 RX ADMIN — PROPRANOLOL HYDROCHLORIDE 10 MG: 10 TABLET ORAL at 09:03

## 2024-03-30 RX ADMIN — MUPIROCIN: 20 OINTMENT TOPICAL at 08:03

## 2024-03-30 RX ADMIN — GUAIFENESIN 200 MG: 200 SOLUTION ORAL at 09:03

## 2024-03-30 RX ADMIN — FENTANYL CITRATE 50 MCG: 50 INJECTION, SOLUTION INTRAMUSCULAR; INTRAVENOUS at 12:03

## 2024-03-30 RX ADMIN — DOCUSATE SODIUM 100 MG: 100 CAPSULE, LIQUID FILLED ORAL at 09:03

## 2024-03-30 RX ADMIN — GABAPENTIN 400 MG: 300 CAPSULE ORAL at 08:03

## 2024-03-30 RX ADMIN — BACITRACIN: 500 OINTMENT TOPICAL at 09:03

## 2024-03-30 RX ADMIN — ACETAMINOPHEN 650 MG: 325 TABLET, FILM COATED ORAL at 01:03

## 2024-03-30 RX ADMIN — FAMOTIDINE 20 MG: 10 INJECTION, SOLUTION INTRAVENOUS at 08:03

## 2024-03-30 RX ADMIN — OXYCODONE HYDROCHLORIDE 5 MG: 5 TABLET ORAL at 03:03

## 2024-03-30 RX ADMIN — PROPRANOLOL HYDROCHLORIDE 20 MG: 20 TABLET ORAL at 01:03

## 2024-03-30 RX ADMIN — POLYETHYLENE GLYCOL 3350 17 G: 17 POWDER, FOR SOLUTION ORAL at 09:03

## 2024-03-30 RX ADMIN — LIDOCAINE 5% 2 PATCH: 700 PATCH TOPICAL at 11:03

## 2024-03-30 RX ADMIN — SODIUM PHOSPHATE, MONOBASIC, MONOHYDRATE AND SODIUM PHOSPHATE, DIBASIC, ANHYDROUS 15 MMOL: 142; 276 INJECTION, SOLUTION INTRAVENOUS at 03:03

## 2024-03-30 RX ADMIN — GUAIFENESIN 200 MG: 200 SOLUTION ORAL at 01:03

## 2024-03-30 RX ADMIN — POLYETHYLENE GLYCOL 3350 17 G: 17 POWDER, FOR SOLUTION ORAL at 08:03

## 2024-03-30 RX ADMIN — PROPRANOLOL HYDROCHLORIDE 20 MG: 20 TABLET ORAL at 08:03

## 2024-03-30 RX ADMIN — BACITRACIN: 500 OINTMENT TOPICAL at 08:03

## 2024-03-30 RX ADMIN — GABAPENTIN 400 MG: 300 CAPSULE ORAL at 09:03

## 2024-03-30 RX ADMIN — ACETAMINOPHEN 650 MG: 325 TABLET, FILM COATED ORAL at 05:03

## 2024-03-30 RX ADMIN — IPRATROPIUM BROMIDE AND ALBUTEROL SULFATE 3 ML: 2.5; .5 SOLUTION RESPIRATORY (INHALATION) at 01:03

## 2024-03-30 RX ADMIN — ENOXAPARIN SODIUM 30 MG: 30 INJECTION SUBCUTANEOUS at 09:03

## 2024-03-30 RX ADMIN — DOXAZOSIN 1 MG: 1 TABLET ORAL at 09:03

## 2024-03-30 RX ADMIN — FUROSEMIDE 10 MG: 10 INJECTION, SOLUTION INTRAMUSCULAR; INTRAVENOUS at 01:03

## 2024-03-30 RX ADMIN — SENNOSIDES 8.6 MG: 8.6 TABLET, FILM COATED ORAL at 09:03

## 2024-03-30 RX ADMIN — FAMOTIDINE 20 MG: 10 INJECTION, SOLUTION INTRAVENOUS at 09:03

## 2024-03-30 RX ADMIN — GUAIFENESIN 200 MG: 200 SOLUTION ORAL at 05:03

## 2024-03-30 RX ADMIN — MUPIROCIN: 20 OINTMENT TOPICAL at 09:03

## 2024-03-30 RX ADMIN — GABAPENTIN 400 MG: 300 CAPSULE ORAL at 03:03

## 2024-03-30 RX ADMIN — IPRATROPIUM BROMIDE AND ALBUTEROL SULFATE 3 ML: 2.5; .5 SOLUTION RESPIRATORY (INHALATION) at 12:03
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
## 2024-03-30 NOTE — PLAN OF CARE
Problem: Occupational Therapy  Goal: Occupational Therapy Goal  Description: LTG: Pt will perform basic ADLs and ADL t/fs with min A using LRAD by d/c.    STG: to be met in two weeks  1. Pt will follow >80% of simple one step commands  2. Pt will perform grooming EOB with min A.   3. Pt will perform sit<>stand with mod A in prep for ADL t/fs.  4. Pt will perform functional mobility to/from toilet with min A using LRAD.    Outcome: Ongoing, Progressing

## 2024-03-30 NOTE — PLAN OF CARE
Problem: Physical Therapy  Goal: Physical Therapy Goal  Description: Goals to be met by: 24     Patient will increase functional independence with mobility by performin. Supine to sit with MInimal Assistance  2. Sit to supine with MInimal Assistance  3. Sit to stand transfer with Minimal Assistance  4. Bed to chair transfer with Minimal Assistance using LRAD  5. Gait TBD  6. Sitting at edge of bed x20 minutes with Minimal Assistance  7. Pt to follow 100% of commands    Outcome: Ongoing, Progressing

## 2024-03-30 NOTE — NURSING
Nurses Note -- 4 Eyes      3/30/2024   12:19 AM      Skin assessed during: Daily Assessment      [] No Altered Skin Integrity Present    [x]Prevention Measures Documented      [x] Yes- Altered Skin Integrity Present or Discovered   [] LDA Added if Not in Epic (Describe Wound)   [] New Altered Skin Integrity was Present on Admit and Documented in LDA   [] Wound Image Taken    Wound Care Consulted? No    Attending Nurse:  Jeaneth Dawson RN/Staff Member:  GAGANDEEP Wang
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
## 2024-03-30 NOTE — NURSING
Nurses Note -- 4 Eyes      3/30/2024   2:33 PM      Skin assessed during: Daily Assessment      [] No Altered Skin Integrity Present    [x]Prevention Measures Documented      [x] Yes- Altered Skin Integrity Present or Discovered   [] LDA Added if Not in Epic (Describe Wound)   [] New Altered Skin Integrity was Present on Admit and Documented in LDA   [] Wound Image Taken    Wound Care Consulted? No    Attending Nurse:  Deana Dwason RN/Staff Member:  GAGANDEEP Ramey
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
## 2024-03-31 LAB
ALBUMIN SERPL-MCNC: 2.4 G/DL (ref 3.4–4.8)
ALBUMIN/GLOB SERPL: 0.7 RATIO (ref 1.1–2)
ALLENS TEST BLOOD GAS (OHS): YES
ALLENS TEST BLOOD GAS (OHS): YES
ALP SERPL-CCNC: 74 UNIT/L (ref 40–150)
ALT SERPL-CCNC: 18 UNIT/L (ref 0–55)
AST SERPL-CCNC: 29 UNIT/L (ref 5–34)
BASE EXCESS BLD CALC-SCNC: 6 MMOL/L (ref -2–2)
BASE EXCESS BLD CALC-SCNC: 8.8 MMOL/L
BASOPHILS # BLD AUTO: 0.02 X10(3)/MCL
BASOPHILS NFR BLD AUTO: 0.2 %
BILIRUB SERPL-MCNC: 0.6 MG/DL
BIPAP(E) BLOOD GAS (OHS): 8 CM H2O
BIPAP(I) BLOOD GAS (OHS): 12 CM H2O
BLOOD GAS SAMPLE TYPE (OHS): ABNORMAL
BLOOD GAS SAMPLE TYPE (OHS): ABNORMAL
BUN SERPL-MCNC: 20.5 MG/DL (ref 8.4–25.7)
CA-I BLD-SCNC: 1 MMOL/L (ref 1.12–1.23)
CA-I BLD-SCNC: 1.13 MMOL/L (ref 1.12–1.23)
CALCIUM SERPL-MCNC: 8.5 MG/DL (ref 8.8–10)
CHLORIDE SERPL-SCNC: 117 MMOL/L (ref 98–107)
CO2 BLDA-SCNC: 30.8 MMOL/L
CO2 BLDA-SCNC: 31.9 MMOL/L
CO2 SERPL-SCNC: 26 MMOL/L (ref 23–31)
COHGB MFR BLDA: 1.6 % (ref 0.5–1.5)
CREAT SERPL-MCNC: 0.7 MG/DL (ref 0.73–1.18)
DRAWN BY BLOOD GAS (OHS): ABNORMAL
DRAWN BY BLOOD GAS (OHS): ABNORMAL
EOSINOPHIL # BLD AUTO: 0.12 X10(3)/MCL (ref 0–0.9)
EOSINOPHIL NFR BLD AUTO: 1.3 %
ERYTHROCYTE [DISTWIDTH] IN BLOOD BY AUTOMATED COUNT: 15.4 % (ref 11.5–17)
GFR SERPLBLD CREATININE-BSD FMLA CKD-EPI: >60 MLS/MIN/1.73/M2
GLOBULIN SER-MCNC: 3.3 GM/DL (ref 2.4–3.5)
GLUCOSE SERPL-MCNC: 158 MG/DL (ref 82–115)
HCO3 BLDA-SCNC: 29.6 MMOL/L (ref 22–26)
HCO3 BLDA-SCNC: 30.9 MMOL/L (ref 22–26)
HCT VFR BLD AUTO: 27.6 % (ref 42–52)
HGB BLD-MCNC: 9 G/DL (ref 14–18)
IMM GRANULOCYTES # BLD AUTO: 0.06 X10(3)/MCL (ref 0–0.04)
IMM GRANULOCYTES NFR BLD AUTO: 0.6 %
INHALED O2 CONCENTRATION: 40 %
LPM (OHS): 1
LYMPHOCYTES # BLD AUTO: 1.52 X10(3)/MCL (ref 0.6–4.6)
LYMPHOCYTES NFR BLD AUTO: 15.9 %
MAGNESIUM SERPL-MCNC: 2.1 MG/DL (ref 1.6–2.6)
MCH RBC QN AUTO: 29.6 PG (ref 27–31)
MCHC RBC AUTO-ENTMCNC: 32.6 G/DL (ref 33–36)
MCV RBC AUTO: 90.8 FL (ref 80–94)
METHGB MFR BLDA: 1.2 % (ref 0.4–1.5)
MODE (OHS): ABNORMAL
MONOCYTES # BLD AUTO: 0.74 X10(3)/MCL (ref 0.1–1.3)
MONOCYTES NFR BLD AUTO: 7.7 %
NEUTROPHILS # BLD AUTO: 7.11 X10(3)/MCL (ref 2.1–9.2)
NEUTROPHILS NFR BLD AUTO: 74.3 %
NRBC BLD AUTO-RTO: 0 %
O2 HB BLOOD GAS (OHS): 96.6 % (ref 94–97)
OXYGEN DEVICE BLOOD GAS (OHS): ABNORMAL
OXYHGB MFR BLDA: 8.2 G/DL (ref 12–16)
PCO2 BLDA: 33 MMHG (ref 35–45)
PCO2 BLDA: 38 MMHG (ref 35–45)
PH BLDA: 7.5 [PH] (ref 7.35–7.45)
PH BLDA: 7.58 [PH] (ref 7.35–7.45)
PHOSPHATE SERPL-MCNC: 1.4 MG/DL (ref 2.3–4.7)
PHOSPHATE SERPL-MCNC: 2.4 MG/DL (ref 2.3–4.7)
PLATELET # BLD AUTO: 153 X10(3)/MCL (ref 130–400)
PMV BLD AUTO: 10.2 FL (ref 7.4–10.4)
PO2 BLDA: 117 MMHG (ref 80–100)
PO2 BLDA: 65 MMHG (ref 80–100)
POTASSIUM BLOOD GAS (OHS): 3.3 MMOL/L (ref 3.5–5)
POTASSIUM BLOOD GAS (OHS): 3.5 MMOL/L (ref 3.5–5)
POTASSIUM SERPL-SCNC: 3.5 MMOL/L (ref 3.5–5.1)
PROT SERPL-MCNC: 5.7 GM/DL (ref 5.8–7.6)
RBC # BLD AUTO: 3.04 X10(6)/MCL (ref 4.7–6.1)
SAMPLE SITE BLOOD GAS (OHS): ABNORMAL
SAMPLE SITE BLOOD GAS (OHS): ABNORMAL
SAO2 % BLDA: 95 %
SAO2 % BLDA: 98.9 %
SODIUM BLOOD GAS (OHS): 143 MMOL/L (ref 137–145)
SODIUM BLOOD GAS (OHS): 149 MMOL/L (ref 137–145)
SODIUM SERPL-SCNC: 151 MMOL/L (ref 136–145)
WBC # SPEC AUTO: 9.57 X10(3)/MCL (ref 4.5–11.5)

## 2024-03-31 PROCEDURE — 99900035 HC TECH TIME PER 15 MIN (STAT)

## 2024-03-31 PROCEDURE — 94799 UNLISTED PULMONARY SVC/PX: CPT

## 2024-03-31 PROCEDURE — 27000221 HC OXYGEN, UP TO 24 HOURS

## 2024-03-31 PROCEDURE — 84100 ASSAY OF PHOSPHORUS: CPT | Performed by: NURSE PRACTITIONER

## 2024-03-31 PROCEDURE — 27000249 HC VAPOTHERM CIRCUIT

## 2024-03-31 PROCEDURE — 37799 UNLISTED PX VASCULAR SURGERY: CPT

## 2024-03-31 PROCEDURE — 94640 AIRWAY INHALATION TREATMENT: CPT

## 2024-03-31 PROCEDURE — 94760 N-INVAS EAR/PLS OXIMETRY 1: CPT | Mod: XB

## 2024-03-31 PROCEDURE — 25000003 PHARM REV CODE 250

## 2024-03-31 PROCEDURE — 63600175 PHARM REV CODE 636 W HCPCS: Performed by: SURGERY

## 2024-03-31 PROCEDURE — 20800000 HC ICU TRAUMA

## 2024-03-31 PROCEDURE — 25000003 PHARM REV CODE 250: Performed by: SURGERY

## 2024-03-31 PROCEDURE — 99900031 HC PATIENT EDUCATION (STAT)

## 2024-03-31 PROCEDURE — 25000003 PHARM REV CODE 250: Performed by: STUDENT IN AN ORGANIZED HEALTH CARE EDUCATION/TRAINING PROGRAM

## 2024-03-31 PROCEDURE — 85025 COMPLETE CBC W/AUTO DIFF WBC: CPT | Performed by: NURSE PRACTITIONER

## 2024-03-31 PROCEDURE — 25000003 PHARM REV CODE 250: Performed by: NURSE PRACTITIONER

## 2024-03-31 PROCEDURE — 82803 BLOOD GASES ANY COMBINATION: CPT

## 2024-03-31 PROCEDURE — 80053 COMPREHEN METABOLIC PANEL: CPT | Performed by: NURSE PRACTITIONER

## 2024-03-31 PROCEDURE — 63600175 PHARM REV CODE 636 W HCPCS: Performed by: NURSE PRACTITIONER

## 2024-03-31 PROCEDURE — 27100092 HC HIGH FLOW DELIVERY CANNULA

## 2024-03-31 PROCEDURE — 36600 WITHDRAWAL OF ARTERIAL BLOOD: CPT

## 2024-03-31 PROCEDURE — 92526 ORAL FUNCTION THERAPY: CPT

## 2024-03-31 PROCEDURE — 25000242 PHARM REV CODE 250 ALT 637 W/ HCPCS: Performed by: SURGERY

## 2024-03-31 PROCEDURE — 27100171 HC OXYGEN HIGH FLOW UP TO 24 HOURS

## 2024-03-31 PROCEDURE — 84100 ASSAY OF PHOSPHORUS: CPT | Performed by: SURGERY

## 2024-03-31 PROCEDURE — 83735 ASSAY OF MAGNESIUM: CPT | Performed by: NURSE PRACTITIONER

## 2024-03-31 RX ORDER — BISACODYL 10 MG/1
10 SUPPOSITORY RECTAL ONCE
Status: COMPLETED | OUTPATIENT
Start: 2024-03-31 | End: 2024-03-31

## 2024-03-31 RX ORDER — GUAIFENESIN 100 MG/5ML
200 SOLUTION ORAL
Status: DISCONTINUED | OUTPATIENT
Start: 2024-03-31 | End: 2024-04-01

## 2024-03-31 RX ADMIN — CALCIUM GLUCONATE 1 G: 20 INJECTION, SOLUTION INTRAVENOUS at 05:03

## 2024-03-31 RX ADMIN — HYDRALAZINE HYDROCHLORIDE 10 MG: 20 INJECTION INTRAMUSCULAR; INTRAVENOUS at 12:03

## 2024-03-31 RX ADMIN — DOCUSATE SODIUM 100 MG: 100 CAPSULE, LIQUID FILLED ORAL at 08:03

## 2024-03-31 RX ADMIN — GABAPENTIN 400 MG: 300 CAPSULE ORAL at 08:03

## 2024-03-31 RX ADMIN — LEVETIRACETAM 500 MG: 100 INJECTION, SOLUTION INTRAVENOUS at 09:03

## 2024-03-31 RX ADMIN — ENOXAPARIN SODIUM 30 MG: 30 INJECTION SUBCUTANEOUS at 08:03

## 2024-03-31 RX ADMIN — ACETAMINOPHEN 650 MG: 325 TABLET, FILM COATED ORAL at 05:03

## 2024-03-31 RX ADMIN — ENOXAPARIN SODIUM 30 MG: 30 INJECTION SUBCUTANEOUS at 09:03

## 2024-03-31 RX ADMIN — FERROUS SULFATE 300 MG: 300 SOLUTION ORAL at 09:03

## 2024-03-31 RX ADMIN — GUAIFENESIN 200 MG: 200 SOLUTION ORAL at 05:03

## 2024-03-31 RX ADMIN — GUAIFENESIN 200 MG: 200 SOLUTION ORAL at 11:03

## 2024-03-31 RX ADMIN — FAMOTIDINE 20 MG: 10 INJECTION, SOLUTION INTRAVENOUS at 08:03

## 2024-03-31 RX ADMIN — PROPRANOLOL HYDROCHLORIDE 20 MG: 20 TABLET ORAL at 09:03

## 2024-03-31 RX ADMIN — BISACODYL 10 MG: 10 SUPPOSITORY RECTAL at 01:03

## 2024-03-31 RX ADMIN — GABAPENTIN 400 MG: 300 CAPSULE ORAL at 03:03

## 2024-03-31 RX ADMIN — DOCUSATE SODIUM 100 MG: 100 CAPSULE, LIQUID FILLED ORAL at 09:03

## 2024-03-31 RX ADMIN — IPRATROPIUM BROMIDE AND ALBUTEROL SULFATE 3 ML: 2.5; .5 SOLUTION RESPIRATORY (INHALATION) at 01:03

## 2024-03-31 RX ADMIN — DOXAZOSIN 1 MG: 1 TABLET ORAL at 09:03

## 2024-03-31 RX ADMIN — DEXMEDETOMIDINE HYDROCHLORIDE 0.4 MCG/KG/HR: 400 INJECTION INTRAVENOUS at 02:03

## 2024-03-31 RX ADMIN — LEVETIRACETAM 500 MG: 100 INJECTION, SOLUTION INTRAVENOUS at 08:03

## 2024-03-31 RX ADMIN — BACITRACIN: 500 OINTMENT TOPICAL at 08:03

## 2024-03-31 RX ADMIN — POTASSIUM PHOSPHATE, MONOBASIC AND POTASSIUM PHOSPHATE, DIBASIC 20 MMOL: 224; 236 INJECTION, SOLUTION, CONCENTRATE INTRAVENOUS at 05:03

## 2024-03-31 RX ADMIN — IPRATROPIUM BROMIDE AND ALBUTEROL SULFATE 3 ML: 2.5; .5 SOLUTION RESPIRATORY (INHALATION) at 09:03

## 2024-03-31 RX ADMIN — THERA TABS 1 TABLET: TAB at 09:03

## 2024-03-31 RX ADMIN — PROPRANOLOL HYDROCHLORIDE 20 MG: 20 TABLET ORAL at 08:03

## 2024-03-31 RX ADMIN — ACETAMINOPHEN 650 MG: 325 TABLET, FILM COATED ORAL at 09:03

## 2024-03-31 RX ADMIN — LIDOCAINE 5% 2 PATCH: 700 PATCH TOPICAL at 01:03

## 2024-03-31 RX ADMIN — CALCIUM GLUCONATE 2 G: 20 INJECTION, SOLUTION INTRAVENOUS at 05:03

## 2024-03-31 RX ADMIN — MUPIROCIN: 20 OINTMENT TOPICAL at 09:03

## 2024-03-31 RX ADMIN — BACITRACIN: 500 OINTMENT TOPICAL at 03:03

## 2024-03-31 RX ADMIN — BACITRACIN: 500 OINTMENT TOPICAL at 09:03

## 2024-03-31 RX ADMIN — FAMOTIDINE 20 MG: 10 INJECTION, SOLUTION INTRAVENOUS at 09:03

## 2024-03-31 RX ADMIN — POLYETHYLENE GLYCOL 3350 17 G: 17 POWDER, FOR SOLUTION ORAL at 09:03

## 2024-03-31 RX ADMIN — SENNOSIDES 8.6 MG: 8.6 TABLET, FILM COATED ORAL at 09:03

## 2024-03-31 RX ADMIN — ACETAMINOPHEN 650 MG: 325 TABLET, FILM COATED ORAL at 02:03

## 2024-03-31 RX ADMIN — POLYETHYLENE GLYCOL 3350 17 G: 17 POWDER, FOR SOLUTION ORAL at 08:03

## 2024-03-31 RX ADMIN — GABAPENTIN 400 MG: 300 CAPSULE ORAL at 09:03

## 2024-03-31 RX ADMIN — IPRATROPIUM BROMIDE AND ALBUTEROL SULFATE 3 ML: 2.5; .5 SOLUTION RESPIRATORY (INHALATION) at 07:03
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
## 2024-03-31 NOTE — PROGRESS NOTES
Orthopedics was asked to re-evaluate the patient secondary to concerns for the skin overlying the clavicle.    Skin is intact with no lesions.  There is no skin compromise or tenting.  There is no erythema or signs of infection.    He was in a sling and will continue this.

## 2024-04-01 ENCOUNTER — TELEPHONE (OUTPATIENT)
Dept: NEUROSURGERY | Facility: CLINIC | Age: 74
End: 2024-04-01
Payer: MEDICARE

## 2024-04-01 LAB
ALBUMIN SERPL-MCNC: 2.2 G/DL (ref 3.4–4.8)
ALBUMIN/GLOB SERPL: 0.6 RATIO (ref 1.1–2)
ALLENS TEST BLOOD GAS (OHS): YES
ALP SERPL-CCNC: 66 UNIT/L (ref 40–150)
ALT SERPL-CCNC: 17 UNIT/L (ref 0–55)
AST SERPL-CCNC: 23 UNIT/L (ref 5–34)
BASE EXCESS BLD CALC-SCNC: 7.1 MMOL/L (ref -2–2)
BASOPHILS # BLD AUTO: 0.01 X10(3)/MCL
BASOPHILS NFR BLD AUTO: 0.1 %
BILIRUB SERPL-MCNC: 0.6 MG/DL
BIPAP(E) BLOOD GAS (OHS): 8 CM H2O
BIPAP(I) BLOOD GAS (OHS): 12 CM H2O
BLOOD GAS SAMPLE TYPE (OHS): ABNORMAL
BUN SERPL-MCNC: 19.1 MG/DL (ref 8.4–25.7)
CA-I BLD-SCNC: 1.11 MMOL/L (ref 1.12–1.23)
CALCIUM SERPL-MCNC: 8.6 MG/DL (ref 8.8–10)
CHLORIDE SERPL-SCNC: 115 MMOL/L (ref 98–107)
CO2 BLDA-SCNC: 31.2 MMOL/L
CO2 SERPL-SCNC: 25 MMOL/L (ref 23–31)
COHGB MFR BLDA: 2.1 % (ref 0.5–1.5)
CREAT SERPL-MCNC: 0.69 MG/DL (ref 0.73–1.18)
CRP SERPL-MCNC: 257.6 MG/L
DRAWN BY BLOOD GAS (OHS): ABNORMAL
EOSINOPHIL # BLD AUTO: 0.09 X10(3)/MCL (ref 0–0.9)
EOSINOPHIL NFR BLD AUTO: 1 %
ERYTHROCYTE [DISTWIDTH] IN BLOOD BY AUTOMATED COUNT: 15.7 % (ref 11.5–17)
GFR SERPLBLD CREATININE-BSD FMLA CKD-EPI: >60 MLS/MIN/1.73/M2
GLOBULIN SER-MCNC: 3.4 GM/DL (ref 2.4–3.5)
GLUCOSE SERPL-MCNC: 158 MG/DL (ref 82–115)
HCO3 BLDA-SCNC: 30.1 MMOL/L (ref 22–26)
HCT VFR BLD AUTO: 25.6 % (ref 42–52)
HGB BLD-MCNC: 8.5 G/DL (ref 14–18)
IMM GRANULOCYTES # BLD AUTO: 0.04 X10(3)/MCL (ref 0–0.04)
IMM GRANULOCYTES NFR BLD AUTO: 0.4 %
INHALED O2 CONCENTRATION: 40 %
LYMPHOCYTES # BLD AUTO: 1.52 X10(3)/MCL (ref 0.6–4.6)
LYMPHOCYTES NFR BLD AUTO: 16.6 %
MAGNESIUM SERPL-MCNC: 2.2 MG/DL (ref 1.6–2.6)
MCH RBC QN AUTO: 30.6 PG (ref 27–31)
MCHC RBC AUTO-ENTMCNC: 33.2 G/DL (ref 33–36)
MCV RBC AUTO: 92.1 FL (ref 80–94)
METHGB MFR BLDA: 1.1 % (ref 0.4–1.5)
MODE (OHS): ABNORMAL
MONOCYTES # BLD AUTO: 0.86 X10(3)/MCL (ref 0.1–1.3)
MONOCYTES NFR BLD AUTO: 9.4 %
NEUTROPHILS # BLD AUTO: 6.65 X10(3)/MCL (ref 2.1–9.2)
NEUTROPHILS NFR BLD AUTO: 72.5 %
NRBC BLD AUTO-RTO: 0.2 %
O2 HB BLOOD GAS (OHS): 96.3 % (ref 94–97)
OXYHGB MFR BLDA: 11 G/DL (ref 12–16)
PCO2 BLDA: 36 MMHG (ref 35–45)
PH BLDA: 7.53 [PH] (ref 7.35–7.45)
PHOSPHATE SERPL-MCNC: 2.5 MG/DL (ref 2.3–4.7)
PLATELET # BLD AUTO: 143 X10(3)/MCL (ref 130–400)
PMV BLD AUTO: 10 FL (ref 7.4–10.4)
PO2 BLDA: 127 MMHG (ref 80–100)
POTASSIUM BLOOD GAS (OHS): 3.7 MMOL/L (ref 3.5–5)
POTASSIUM SERPL-SCNC: 3.7 MMOL/L (ref 3.5–5.1)
PREALB SERPL-MCNC: 8.2 MG/DL (ref 16–42)
PROT SERPL-MCNC: 5.6 GM/DL (ref 5.8–7.6)
RBC # BLD AUTO: 2.78 X10(6)/MCL (ref 4.7–6.1)
SAMPLE SITE BLOOD GAS (OHS): ABNORMAL
SAO2 % BLDA: 99.2 %
SODIUM BLOOD GAS (OHS): 145 MMOL/L (ref 137–145)
SODIUM SERPL-SCNC: 150 MMOL/L (ref 136–145)
WBC # SPEC AUTO: 9.17 X10(3)/MCL (ref 4.5–11.5)

## 2024-04-01 PROCEDURE — 80053 COMPREHEN METABOLIC PANEL: CPT | Performed by: NURSE PRACTITIONER

## 2024-04-01 PROCEDURE — 83735 ASSAY OF MAGNESIUM: CPT | Performed by: NURSE PRACTITIONER

## 2024-04-01 PROCEDURE — 94799 UNLISTED PULMONARY SVC/PX: CPT

## 2024-04-01 PROCEDURE — 25000242 PHARM REV CODE 250 ALT 637 W/ HCPCS: Performed by: SURGERY

## 2024-04-01 PROCEDURE — 97535 SELF CARE MNGMENT TRAINING: CPT

## 2024-04-01 PROCEDURE — 99900035 HC TECH TIME PER 15 MIN (STAT)

## 2024-04-01 PROCEDURE — 99900031 HC PATIENT EDUCATION (STAT)

## 2024-04-01 PROCEDURE — 25000003 PHARM REV CODE 250

## 2024-04-01 PROCEDURE — 84134 ASSAY OF PREALBUMIN: CPT | Performed by: NURSE PRACTITIONER

## 2024-04-01 PROCEDURE — 86140 C-REACTIVE PROTEIN: CPT | Performed by: NURSE PRACTITIONER

## 2024-04-01 PROCEDURE — 36600 WITHDRAWAL OF ARTERIAL BLOOD: CPT

## 2024-04-01 PROCEDURE — 25000003 PHARM REV CODE 250: Performed by: SURGERY

## 2024-04-01 PROCEDURE — 63600175 PHARM REV CODE 636 W HCPCS: Performed by: NURSE PRACTITIONER

## 2024-04-01 PROCEDURE — 85025 COMPLETE CBC W/AUTO DIFF WBC: CPT | Performed by: NURSE PRACTITIONER

## 2024-04-01 PROCEDURE — 97530 THERAPEUTIC ACTIVITIES: CPT

## 2024-04-01 PROCEDURE — 94660 CPAP INITIATION&MGMT: CPT

## 2024-04-01 PROCEDURE — 84100 ASSAY OF PHOSPHORUS: CPT | Performed by: NURSE PRACTITIONER

## 2024-04-01 PROCEDURE — 94760 N-INVAS EAR/PLS OXIMETRY 1: CPT

## 2024-04-01 PROCEDURE — 20800000 HC ICU TRAUMA

## 2024-04-01 PROCEDURE — 27100171 HC OXYGEN HIGH FLOW UP TO 24 HOURS

## 2024-04-01 PROCEDURE — 94640 AIRWAY INHALATION TREATMENT: CPT

## 2024-04-01 PROCEDURE — 94664 DEMO&/EVAL PT USE INHALER: CPT

## 2024-04-01 PROCEDURE — 37799 UNLISTED PX VASCULAR SURGERY: CPT

## 2024-04-01 PROCEDURE — 25000003 PHARM REV CODE 250: Performed by: NURSE PRACTITIONER

## 2024-04-01 PROCEDURE — 63600175 PHARM REV CODE 636 W HCPCS: Performed by: SURGERY

## 2024-04-01 RX ORDER — TRAMADOL HYDROCHLORIDE 50 MG/1
50 TABLET ORAL EVERY 6 HOURS PRN
Status: DISCONTINUED | OUTPATIENT
Start: 2024-04-01 | End: 2024-04-03

## 2024-04-01 RX ORDER — SENNOSIDES 8.6 MG/1
8.6 TABLET ORAL DAILY
Status: DISCONTINUED | OUTPATIENT
Start: 2024-04-02 | End: 2024-04-10

## 2024-04-01 RX ORDER — LORAZEPAM 1 MG/1
1 TABLET ORAL EVERY 6 HOURS PRN
Status: DISCONTINUED | OUTPATIENT
Start: 2024-04-01 | End: 2024-04-15

## 2024-04-01 RX ORDER — TALC
6 POWDER (GRAM) TOPICAL NIGHTLY PRN
Status: DISCONTINUED | OUTPATIENT
Start: 2024-04-01 | End: 2024-04-15

## 2024-04-01 RX ORDER — ENOXAPARIN SODIUM 100 MG/ML
40 INJECTION SUBCUTANEOUS EVERY 12 HOURS
Status: DISCONTINUED | OUTPATIENT
Start: 2024-04-01 | End: 2024-04-30 | Stop reason: HOSPADM

## 2024-04-01 RX ORDER — METHOCARBAMOL 500 MG/1
500 TABLET, FILM COATED ORAL 4 TIMES DAILY
Status: DISCONTINUED | OUTPATIENT
Start: 2024-04-01 | End: 2024-04-03

## 2024-04-01 RX ORDER — DOCUSATE SODIUM 50 MG/5ML
100 LIQUID ORAL 2 TIMES DAILY
Status: DISCONTINUED | OUTPATIENT
Start: 2024-04-01 | End: 2024-04-10

## 2024-04-01 RX ORDER — ACETAMINOPHEN 650 MG/20.3ML
650 LIQUID ORAL EVERY 6 HOURS
Status: COMPLETED | OUTPATIENT
Start: 2024-04-01 | End: 2024-04-02

## 2024-04-01 RX ORDER — POLYETHYLENE GLYCOL 3350 17 G/17G
17 POWDER, FOR SOLUTION ORAL 2 TIMES DAILY
Status: DISCONTINUED | OUTPATIENT
Start: 2024-04-02 | End: 2024-04-10

## 2024-04-01 RX ORDER — GUAIFENESIN 100 MG/5ML
400 SOLUTION ORAL
Status: COMPLETED | OUTPATIENT
Start: 2024-04-01 | End: 2024-04-01

## 2024-04-01 RX ORDER — CALCIUM GLUCONATE 20 MG/ML
1 INJECTION, SOLUTION INTRAVENOUS ONCE
Status: DISCONTINUED | OUTPATIENT
Start: 2024-04-01 | End: 2024-04-03

## 2024-04-01 RX ADMIN — METHOCARBAMOL 500 MG: 500 TABLET ORAL at 12:04

## 2024-04-01 RX ADMIN — FAMOTIDINE 20 MG: 10 INJECTION, SOLUTION INTRAVENOUS at 09:04

## 2024-04-01 RX ADMIN — BACITRACIN: 500 OINTMENT TOPICAL at 08:04

## 2024-04-01 RX ADMIN — ENOXAPARIN SODIUM 40 MG: 40 INJECTION SUBCUTANEOUS at 08:04

## 2024-04-01 RX ADMIN — DOXAZOSIN 1 MG: 1 TABLET ORAL at 09:04

## 2024-04-01 RX ADMIN — ACETAMINOPHEN 650 MG: 650 SOLUTION ORAL at 12:04

## 2024-04-01 RX ADMIN — THERA TABS 1 TABLET: TAB at 09:04

## 2024-04-01 RX ADMIN — IPRATROPIUM BROMIDE AND ALBUTEROL SULFATE 3 ML: 2.5; .5 SOLUTION RESPIRATORY (INHALATION) at 01:04

## 2024-04-01 RX ADMIN — ACETAMINOPHEN 650 MG: 650 SOLUTION ORAL at 05:04

## 2024-04-01 RX ADMIN — LEVETIRACETAM 500 MG: 100 INJECTION, SOLUTION INTRAVENOUS at 09:04

## 2024-04-01 RX ADMIN — DOCUSATE SODIUM 100 MG: 100 CAPSULE, LIQUID FILLED ORAL at 09:04

## 2024-04-01 RX ADMIN — LEVETIRACETAM 500 MG: 100 INJECTION, SOLUTION INTRAVENOUS at 08:04

## 2024-04-01 RX ADMIN — IPRATROPIUM BROMIDE AND ALBUTEROL SULFATE 3 ML: 2.5; .5 SOLUTION RESPIRATORY (INHALATION) at 02:04

## 2024-04-01 RX ADMIN — LIDOCAINE 5% 2 PATCH: 700 PATCH TOPICAL at 01:04

## 2024-04-01 RX ADMIN — BACITRACIN: 500 OINTMENT TOPICAL at 09:04

## 2024-04-01 RX ADMIN — SENNOSIDES 8.6 MG: 8.6 TABLET, FILM COATED ORAL at 09:04

## 2024-04-01 RX ADMIN — FAMOTIDINE 20 MG: 10 INJECTION, SOLUTION INTRAVENOUS at 08:04

## 2024-04-01 RX ADMIN — DOCUSATE SODIUM LIQUID 100 MG: 100 LIQUID ORAL at 10:04

## 2024-04-01 RX ADMIN — IPRATROPIUM BROMIDE AND ALBUTEROL SULFATE 3 ML: 2.5; .5 SOLUTION RESPIRATORY (INHALATION) at 07:04

## 2024-04-01 RX ADMIN — METHOCARBAMOL 500 MG: 500 TABLET ORAL at 05:04

## 2024-04-01 RX ADMIN — GUAIFENESIN 400 MG: 200 SOLUTION ORAL at 11:04

## 2024-04-01 RX ADMIN — DOCUSATE SODIUM 100 MG: 100 CAPSULE, LIQUID FILLED ORAL at 08:04

## 2024-04-01 RX ADMIN — GUAIFENESIN 400 MG: 200 SOLUTION ORAL at 12:04

## 2024-04-01 RX ADMIN — GABAPENTIN 400 MG: 300 CAPSULE ORAL at 09:04

## 2024-04-01 RX ADMIN — GUAIFENESIN 200 MG: 200 SOLUTION ORAL at 04:04

## 2024-04-01 RX ADMIN — GUAIFENESIN 400 MG: 200 SOLUTION ORAL at 05:04

## 2024-04-01 RX ADMIN — BACITRACIN: 500 OINTMENT TOPICAL at 03:04

## 2024-04-01 RX ADMIN — PROPRANOLOL HYDROCHLORIDE 20 MG: 20 TABLET ORAL at 08:04

## 2024-04-01 RX ADMIN — METHOCARBAMOL 500 MG: 500 TABLET ORAL at 08:04

## 2024-04-01 RX ADMIN — POLYETHYLENE GLYCOL 3350 17 G: 17 POWDER, FOR SOLUTION ORAL at 08:04

## 2024-04-01 RX ADMIN — PROPRANOLOL HYDROCHLORIDE 20 MG: 20 TABLET ORAL at 09:04

## 2024-04-01 RX ADMIN — POLYETHYLENE GLYCOL 3350 17 G: 17 POWDER, FOR SOLUTION ORAL at 09:04

## 2024-04-01 RX ADMIN — ENOXAPARIN SODIUM 30 MG: 30 INJECTION SUBCUTANEOUS at 09:04

## 2024-04-01 RX ADMIN — CALCIUM GLUCONATE 1 G: 20 INJECTION, SOLUTION INTRAVENOUS at 01:04
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
## 2024-04-01 NOTE — PLAN OF CARE
CPAP Bipap  Tube feed NG  Therapy recommending moderate intensity. RUE non wt bearing  Will follow progress and clarify if neuro rehab vs skilled is needed.  SSC aware.

## 2024-04-01 NOTE — PT/OT/SLP PROGRESS
Occupational Therapy   Treatment    Name: Anayeli Taylor  MRN: [id number]  Admitting Diagnosis:  Traumatic subdural hematoma (SDH)  5 Days Post-Op    Recommendations:     Recommended therapy intensity at discharge: Moderate Intensity Therapy   Discharge Equipment Recommendations:   (TBD)  Barriers to discharge:   (ongoing medical needs; severity of deficits)    Assessment:     Anayeli Taylor is a 73 y.o. male with a medical diagnosis of SDH s/p crani; acute respiratory failure requiring intubation now extubated, R pneumothorax, rib fxs, R medial clavicle and acromion fx (non-op), right adrenal hematoma. Performance deficits affecting function are weakness, impaired endurance, impaired self care skills, impaired functional mobility, gait instability, impaired balance, impaired cognition, decreased coordination, decreased upper extremity function, decreased lower extremity function, decreased safety awareness, orthopedic precautions. Pt following commands appropriately today, with slight delay. However, unable to perform chair transfer 2/2 excessive back and knee pain with sit <> stands and prolonged sitting EOB; required extra assist.     Rehab Prognosis:  Good; patient would benefit from acute skilled OT services to address these deficits and reach maximum level of function.       Plan:     Patient to be seen 5 x/week to address the above listed problems via self-care/home management, therapeutic activities, therapeutic exercises  Plan of Care Expires: 04/30/24  Plan of Care Reviewed with: patient    Subjective     Pain/Comfort:  Pain Rating 1: 10/10 (back pain and bilateral knee pain that's exacerbated by mobility)  Pain Addressed 1: Nurse notified, Cessation of Activity, Reposition    Objective:     Communicated with: NSG prior to session.  Patient found HOB elevated with NG tube, oxygen, blood pressure cuff, palmer catheter, peripheral IV upon OT entry to room.    General Precautions: Standard, fall (SBP < 160)     Orthopedic Precautions:RUE non weight bearing (pendulum ROM ok, full ROM distally)  Braces: UE Sling  Respiratory Status: Nasal cannula, flow 5 L/min  Vital Signs: 136/73 100% 97 bpm     Occupational Performance:     Bed Mobility:    Patient completed Rolling/Turning to Left with  total assistance  Patient completed Supine to Sit with total assistance and 2 persons  Patient completed Sit to Supine with total assistance and 2 persons   Sitting EOB with total assistance; severe posterior lean, appears to be purposeful as he is avoiding hip flexion; reports 10/10 back pain when sitting erect    Functional Mobility/Transfers:  Patient completed Sit <> Stand Transfer with total assistance and of 2 persons  with  hand-held assist ; kyphotic posture, reports bilateral knee pain in standing. Performed to prep Pt for transfers for ADLs  Functional Mobility: n/a    Therapeutic Positioning    OT interventions performed during the course of today's session in an effort to prevent and/or reduce acquired pressure injuries:   Education was provided on benefits of and recommendations for therapeutic positioning  Therapeutic positioning was provided at the conclusion of session to offload all bony prominences for the prevention and/or reduction of pressure injuries  Positioning recommendations were communicated to care team       Patient Education:  Patient provided with verbal education education regarding OT role/goals/POC, fall prevention, safety awareness, Discharge/DME recommendations, pressure ulcer prevention, home exercise program , and RUE precautions .  Additional teaching is warranted.      Patient left with bed in chair position with all lines intact, call button in reach, wedge under R side, bed alarm on, and NSG notified.    GOALS:   Multidisciplinary Problems       Occupational Therapy Goals          Problem: Occupational Therapy    Goal Priority Disciplines Outcome Interventions   Occupational Therapy Goal     OT,  PT/OT Ongoing, Progressing    Description: LTG: Pt will perform basic ADLs and ADL t/fs with min A using LRAD by d/c.    STG: to be met in two weeks  1. Pt will follow >80% of simple one step commands  2. Pt will perform grooming EOB with min A.   3. Pt will perform sit<>stand with mod A in prep for ADL t/fs.  4. Pt will perform functional mobility to/from toilet with min A using LRAD.                         Time Tracking:     OT Date of Treatment:    OT Start Time: 0835  OT Stop Time: 0858  OT Total Time (min): 23 min    Billable Minutes:Self Care/Home Management 2    OT/LAUREEN: OT     Number of LAUREEN visits since last OT visit: 1 4/1/2024
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
## 2024-04-01 NOTE — TELEPHONE ENCOUNTER
The patient is still inpatient. He will need a F/U with one of the [facility] in 2 weeks with a CT head 2 days prior. I will await D/C.   spouse/PH w/ 3 steps in and 1 flight of steps inside

## 2024-04-01 NOTE — PT/OT/SLP PROGRESS
Attempted to complete MBS study, however pt unable to maintain appropriate level of alertness for safe PO intake.  Will continue to follow and tx as appropriate.

## 2024-04-01 NOTE — PT/OT/SLP PROGRESS
Physical Therapy Treatment    Patient Name:  Anayeli Taylor   MRN:  [id number]    Recommendations:     Discharge therapy intensity: Moderate Intensity Therapy   Discharge Equipment Recommendations: to be determined by next level of care  Barriers to discharge: Decreased caregiver support, Impaired mobility, and Ongoing medical needs    Assessment:     Anayeli Taylor is a 73 y.o. male admitted with a medical diagnosis of SDH s/p crani; acute respiratory failure requiring intubation now extubated, R pneumothorax, rib fxs, R medial clavicle and acromion fx (non-op), right adrenal hematoma.  He presents with the following impairments/functional limitations: weakness, decreased upper extremity function, impaired endurance, impaired balance, decreased lower extremity function, impaired self care skills, impaired functional mobility, impaired cognition, decreased safety awareness. Pt tolerated session poorly, resisting majority of movements with significant posterior lean. Pt required Total A x 2 for all mobility. Once EOB, pt complaining of low back pain and B knee pain. Attempted t/f, able to clear glutes with B support under arms; however, pt resisting and complaining of 10/10 pain and stating he needs to lay back down. Repositioned in chair mode to promote upright sitting tolerance. RN notified of pain complaints limiting therapy.     Rehab Prognosis: Fair; patient would benefit from acute skilled PT services to address these deficits and reach maximum level of function.    Recent Surgery: Procedure(s) (LRB):  CRANIOTOMY, FOR SUBDURAL HEMATOMA EVACUATION-RIGHT (Right)  Insertion,central venous access device (Right) 5 Days Post-Op    Plan:     During this hospitalization, patient to be seen 5 x/week to address the identified rehab impairments via therapeutic activities, therapeutic exercises, neuromuscular re-education, gait training and progress toward the following goals:    Plan of Care Expires:  04/30/24    Subjective      Chief Complaint: pain  Patient/Family Comments/goals: to lay down  Pain/Comfort:  Pain Rating 1: 10/10 (back pain and B knee pain)      Objective:     Communicated with RN prior to session.  Patient found HOB elevated with blood pressure cuff, peripheral IV, SCD, pressure relief boots, palmer catheter, pulse ox (continuous), telemetry, NG tube, oxygen upon PT entry to room.     General Precautions: Standard, fall (<160)  Orthopedic Precautions: RUE non weight bearing  Braces: UE Sling  Respiratory Status: Nasal cannula, flow 5 L/min  Blood Pressure: 123/79    Functional Mobility:  Bed Mobility:     Supine to Sit: total assistance and of 2 persons  Sit to Supine: total assistance and of 2 persons  Transfers:     Sit to Stand:  total assistance and of 2 persons with hand-held assist x 2 reps ~5-8 seconds of standing on each attempt.   Balance: poor static sitting and standing with significant posterior lean. Unable to correct with cuing for anterior weight shift. Pt resisting movement and requesting to return to supine. Attempted LE and balance activity sitting EOB, but pt unable to participate.     Education:  Patient provided with verbal education education regarding PT role/goals/POC, fall prevention, and safety awareness.  Understanding was verbalized, however additional teaching warranted.     Patient left with bed in chair position with all lines intact, call button in reach, wedge under R side, pressure relief boots, and RN notified    GOALS:   Multidisciplinary Problems       Physical Therapy Goals          Problem: Physical Therapy    Goal Priority Disciplines Outcome Goal Variances Interventions   Physical Therapy Goal     PT, PT/OT Ongoing, Progressing     Description: Goals to be met by: 24     Patient will increase functional independence with mobility by performin. Supine to sit with MInimal Assistance  2. Sit to supine with MInimal Assistance  3. Sit to stand transfer with Minimal  Assistance  4. Bed to chair transfer with Minimal Assistance using LRAD  5. Gait TBD  6. Sitting at edge of bed x20 minutes with Minimal Assistance  7. Pt to follow 100% of commands                         Time Tracking:     PT Received On: 04/01/24  PT Start Time: 0834     PT Stop Time: 0858  PT Total Time (min): 24 min     Billable Minutes: Therapeutic Activity 24    Treatment Type: Treatment  PT/PTA: PT     Number of PTA visits since last PT visit: 1 04/01/2024

## 2024-04-01 NOTE — NURSING
Nurses Note -- 4 Eyes      4/1/2024   6:11 AM      Skin assessed during: Daily Assessment      [] No Altered Skin Integrity Present    [x]Prevention Measures Documented      [x] Yes- Altered Skin Integrity Present or Discovered   [] LDA Added if Not in Epic (Describe Wound)   [] New Altered Skin Integrity was Present on Admit and Documented in LDA   [] Wound Image Taken    Wound Care Consulted? No    Attending Nurse:  Nory Dawson RN/Staff Member:  GAGANDEEP Rodas

## 2024-04-01 NOTE — NURSING
Nurses Note -- 4 Eyes      4/1/2024   4:26 PM      Skin assessed during: Daily Assessment      [] No Altered Skin Integrity Present    [x]Prevention Measures Documented      [x] Yes- Altered Skin Integrity Present or Discovered   [] LDA Added if Not in Epic (Describe Wound)   [] New Altered Skin Integrity was Present on Admit and Documented in LDA   [] Wound Image Taken    Wound Care Consulted? No    Attending Nurse:  Deana Dawson RN/Staff Member:  GAGANDEEP Ramey

## 2024-04-02 PROBLEM — S51.012A ELBOW LACERATION, LEFT, INITIAL ENCOUNTER: Status: ACTIVE | Noted: 2024-04-02

## 2024-04-02 PROBLEM — S01.01XA SCALP LACERATION: Status: ACTIVE | Noted: 2024-04-02

## 2024-04-02 PROBLEM — E27.49 ADRENAL HEMORRHAGE: Status: ACTIVE | Noted: 2024-04-02

## 2024-04-02 PROBLEM — J94.2 HEMOPNEUMOTHORAX ON RIGHT: Status: ACTIVE | Noted: 2024-04-02

## 2024-04-02 PROBLEM — S27.321A CONTUSION OF RIGHT LUNG: Status: ACTIVE | Noted: 2024-04-02

## 2024-04-02 LAB
ALBUMIN SERPL-MCNC: 2.1 G/DL (ref 3.4–4.8)
ALBUMIN/GLOB SERPL: 0.7 RATIO (ref 1.1–2)
ALLENS TEST BLOOD GAS (OHS): YES
ALP SERPL-CCNC: 93 UNIT/L (ref 40–150)
ALT SERPL-CCNC: 46 UNIT/L (ref 0–55)
AST SERPL-CCNC: 73 UNIT/L (ref 5–34)
BASE EXCESS BLD CALC-SCNC: 5.9 MMOL/L (ref -2–2)
BASOPHILS # BLD AUTO: 0.02 X10(3)/MCL
BASOPHILS NFR BLD AUTO: 0.2 %
BILIRUB SERPL-MCNC: 0.7 MG/DL
BIPAP(E) BLOOD GAS (OHS): 8 CM H2O
BIPAP(I) BLOOD GAS (OHS): 12 CM H2O
BLOOD GAS SAMPLE TYPE (OHS): ABNORMAL
BUN SERPL-MCNC: 21.3 MG/DL (ref 8.4–25.7)
CA-I BLD-SCNC: 1.11 MMOL/L (ref 1.12–1.23)
CALCIUM SERPL-MCNC: 7.9 MG/DL (ref 8.8–10)
CHLORIDE SERPL-SCNC: 113 MMOL/L (ref 98–107)
CO2 BLDA-SCNC: 30.9 MMOL/L
CO2 SERPL-SCNC: 26 MMOL/L (ref 23–31)
COHGB MFR BLDA: 1.5 % (ref 0.5–1.5)
CREAT SERPL-MCNC: 0.72 MG/DL (ref 0.73–1.18)
DRAWN BY BLOOD GAS (OHS): ABNORMAL
EOSINOPHIL # BLD AUTO: 0.14 X10(3)/MCL (ref 0–0.9)
EOSINOPHIL NFR BLD AUTO: 1.3 %
ERYTHROCYTE [DISTWIDTH] IN BLOOD BY AUTOMATED COUNT: 15.8 % (ref 11.5–17)
GFR SERPLBLD CREATININE-BSD FMLA CKD-EPI: >60 MLS/MIN/1.73/M2
GLOBULIN SER-MCNC: 3.1 GM/DL (ref 2.4–3.5)
GLUCOSE SERPL-MCNC: 157 MG/DL (ref 82–115)
HCO3 BLDA-SCNC: 29.7 MMOL/L (ref 22–26)
HCT VFR BLD AUTO: 26.2 % (ref 42–52)
HGB BLD-MCNC: 8.2 G/DL (ref 14–18)
IMM GRANULOCYTES # BLD AUTO: 0.09 X10(3)/MCL (ref 0–0.04)
IMM GRANULOCYTES NFR BLD AUTO: 0.9 %
INHALED O2 CONCENTRATION: 30 %
LYMPHOCYTES # BLD AUTO: 1.64 X10(3)/MCL (ref 0.6–4.6)
LYMPHOCYTES NFR BLD AUTO: 15.6 %
MAGNESIUM SERPL-MCNC: 2.3 MG/DL (ref 1.6–2.6)
MCH RBC QN AUTO: 30.4 PG (ref 27–31)
MCHC RBC AUTO-ENTMCNC: 31.3 G/DL (ref 33–36)
MCV RBC AUTO: 97 FL (ref 80–94)
METHGB MFR BLDA: 1.1 % (ref 0.4–1.5)
MODE (OHS): ABNORMAL
MONOCYTES # BLD AUTO: 0.89 X10(3)/MCL (ref 0.1–1.3)
MONOCYTES NFR BLD AUTO: 8.5 %
NEUTROPHILS # BLD AUTO: 7.75 X10(3)/MCL (ref 2.1–9.2)
NEUTROPHILS NFR BLD AUTO: 73.5 %
NRBC BLD AUTO-RTO: 0.2 %
O2 HB BLOOD GAS (OHS): 95.5 % (ref 94–97)
OHS QRS DURATION: 82 MS
OHS QRS DURATION: 88 MS
OHS QTC CALCULATION: 411 MS
OHS QTC CALCULATION: 429 MS
OXYHGB MFR BLDA: 11.9 G/DL (ref 12–16)
PCO2 BLDA: 39 MMHG (ref 35–45)
PH BLDA: 7.49 [PH] (ref 7.35–7.45)
PHOSPHATE SERPL-MCNC: 2.6 MG/DL (ref 2.3–4.7)
PLATELET # BLD AUTO: 187 X10(3)/MCL (ref 130–400)
PMV BLD AUTO: 10.8 FL (ref 7.4–10.4)
PO2 BLDA: 95 MMHG (ref 80–100)
POTASSIUM BLOOD GAS (OHS): 3.7 MMOL/L (ref 3.5–5)
POTASSIUM SERPL-SCNC: 4.2 MMOL/L (ref 3.5–5.1)
PROT SERPL-MCNC: 5.2 GM/DL (ref 5.8–7.6)
RBC # BLD AUTO: 2.7 X10(6)/MCL (ref 4.7–6.1)
SAMPLE SITE BLOOD GAS (OHS): ABNORMAL
SAO2 % BLDA: 97.9 %
SODIUM BLOOD GAS (OHS): 142 MMOL/L (ref 137–145)
SODIUM SERPL-SCNC: 146 MMOL/L (ref 136–145)
WBC # SPEC AUTO: 10.53 X10(3)/MCL (ref 4.5–11.5)

## 2024-04-02 PROCEDURE — 94664 DEMO&/EVAL PT USE INHALER: CPT

## 2024-04-02 PROCEDURE — 80053 COMPREHEN METABOLIC PANEL: CPT | Performed by: NURSE PRACTITIONER

## 2024-04-02 PROCEDURE — 25000242 PHARM REV CODE 250 ALT 637 W/ HCPCS: Performed by: SURGERY

## 2024-04-02 PROCEDURE — 36600 WITHDRAWAL OF ARTERIAL BLOOD: CPT

## 2024-04-02 PROCEDURE — 63600175 PHARM REV CODE 636 W HCPCS: Performed by: NURSE PRACTITIONER

## 2024-04-02 PROCEDURE — 63600175 PHARM REV CODE 636 W HCPCS: Performed by: SURGERY

## 2024-04-02 PROCEDURE — 94760 N-INVAS EAR/PLS OXIMETRY 1: CPT

## 2024-04-02 PROCEDURE — 97530 THERAPEUTIC ACTIVITIES: CPT

## 2024-04-02 PROCEDURE — 25000003 PHARM REV CODE 250

## 2024-04-02 PROCEDURE — 99900035 HC TECH TIME PER 15 MIN (STAT)

## 2024-04-02 PROCEDURE — 97112 NEUROMUSCULAR REEDUCATION: CPT

## 2024-04-02 PROCEDURE — 37799 UNLISTED PX VASCULAR SURGERY: CPT

## 2024-04-02 PROCEDURE — 84100 ASSAY OF PHOSPHORUS: CPT | Performed by: NURSE PRACTITIONER

## 2024-04-02 PROCEDURE — 85025 COMPLETE CBC W/AUTO DIFF WBC: CPT | Performed by: NURSE PRACTITIONER

## 2024-04-02 PROCEDURE — 25000003 PHARM REV CODE 250: Performed by: SURGERY

## 2024-04-02 PROCEDURE — 94660 CPAP INITIATION&MGMT: CPT

## 2024-04-02 PROCEDURE — 83735 ASSAY OF MAGNESIUM: CPT | Performed by: NURSE PRACTITIONER

## 2024-04-02 PROCEDURE — 94799 UNLISTED PULMONARY SVC/PX: CPT

## 2024-04-02 PROCEDURE — 93005 ELECTROCARDIOGRAM TRACING: CPT

## 2024-04-02 PROCEDURE — 94761 N-INVAS EAR/PLS OXIMETRY MLT: CPT

## 2024-04-02 PROCEDURE — 94640 AIRWAY INHALATION TREATMENT: CPT

## 2024-04-02 PROCEDURE — 27100171 HC OXYGEN HIGH FLOW UP TO 24 HOURS

## 2024-04-02 PROCEDURE — 25000003 PHARM REV CODE 250: Performed by: NURSE PRACTITIONER

## 2024-04-02 PROCEDURE — 11000001 HC ACUTE MED/SURG PRIVATE ROOM

## 2024-04-02 PROCEDURE — 93010 ELECTROCARDIOGRAM REPORT: CPT | Mod: ,,, | Performed by: INTERNAL MEDICINE

## 2024-04-02 PROCEDURE — 97110 THERAPEUTIC EXERCISES: CPT

## 2024-04-02 RX ORDER — FUROSEMIDE 10 MG/ML
10 INJECTION INTRAMUSCULAR; INTRAVENOUS ONCE
Status: COMPLETED | OUTPATIENT
Start: 2024-04-02 | End: 2024-04-02

## 2024-04-02 RX ORDER — GUAIFENESIN 100 MG/5ML
200 SOLUTION ORAL EVERY 6 HOURS
Status: DISCONTINUED | OUTPATIENT
Start: 2024-04-02 | End: 2024-04-15

## 2024-04-02 RX ORDER — SODIUM CHLORIDE 9 MG/ML
INJECTION, SOLUTION INTRAVENOUS
Status: DISCONTINUED | OUTPATIENT
Start: 2024-04-02 | End: 2024-04-03

## 2024-04-02 RX ORDER — FERROUS SULFATE, DRIED 160(50) MG
2 TABLET, EXTENDED RELEASE ORAL DAILY
Status: DISCONTINUED | OUTPATIENT
Start: 2024-04-02 | End: 2024-04-30 | Stop reason: HOSPADM

## 2024-04-02 RX ORDER — ACETAMINOPHEN 325 MG/1
650 TABLET ORAL EVERY 6 HOURS
Status: DISCONTINUED | OUTPATIENT
Start: 2024-04-02 | End: 2024-04-02

## 2024-04-02 RX ORDER — ACETAMINOPHEN 650 MG/20.3ML
650 LIQUID ORAL EVERY 6 HOURS
Status: COMPLETED | OUTPATIENT
Start: 2024-04-02 | End: 2024-04-07

## 2024-04-02 RX ORDER — PROPRANOLOL HYDROCHLORIDE 20 MG/1
20 TABLET ORAL 3 TIMES DAILY
Status: DISCONTINUED | OUTPATIENT
Start: 2024-04-02 | End: 2024-04-13

## 2024-04-02 RX ORDER — BISACODYL 10 MG/1
10 SUPPOSITORY RECTAL ONCE
Status: COMPLETED | OUTPATIENT
Start: 2024-04-02 | End: 2024-04-02

## 2024-04-02 RX ADMIN — THERA TABS 1 TABLET: TAB at 08:04

## 2024-04-02 RX ADMIN — LIDOCAINE 5% 2 PATCH: 700 PATCH TOPICAL at 12:04

## 2024-04-02 RX ADMIN — METHOCARBAMOL 500 MG: 500 TABLET ORAL at 12:04

## 2024-04-02 RX ADMIN — METHOCARBAMOL 500 MG: 500 TABLET ORAL at 08:04

## 2024-04-02 RX ADMIN — PROPRANOLOL HYDROCHLORIDE 20 MG: 20 TABLET ORAL at 08:04

## 2024-04-02 RX ADMIN — LORAZEPAM 1 MG: 1 TABLET ORAL at 08:04

## 2024-04-02 RX ADMIN — DOCUSATE SODIUM LIQUID 100 MG: 100 LIQUID ORAL at 08:04

## 2024-04-02 RX ADMIN — ENOXAPARIN SODIUM 40 MG: 40 INJECTION SUBCUTANEOUS at 08:04

## 2024-04-02 RX ADMIN — GUAIFENESIN 200 MG: 200 SOLUTION ORAL at 11:04

## 2024-04-02 RX ADMIN — BACITRACIN: 500 OINTMENT TOPICAL at 04:04

## 2024-04-02 RX ADMIN — ACETAMINOPHEN 650 MG: 650 SOLUTION ORAL at 12:04

## 2024-04-02 RX ADMIN — POLYETHYLENE GLYCOL 3350 17 G: 17 POWDER, FOR SOLUTION ORAL at 08:04

## 2024-04-02 RX ADMIN — QUETIAPINE FUMARATE 12.5 MG: 25 TABLET ORAL at 08:04

## 2024-04-02 RX ADMIN — IPRATROPIUM BROMIDE AND ALBUTEROL SULFATE 3 ML: 2.5; .5 SOLUTION RESPIRATORY (INHALATION) at 01:04

## 2024-04-02 RX ADMIN — SENNOSIDES 8.6 MG: 8.6 TABLET, FILM COATED ORAL at 08:04

## 2024-04-02 RX ADMIN — IPRATROPIUM BROMIDE AND ALBUTEROL SULFATE 3 ML: 2.5; .5 SOLUTION RESPIRATORY (INHALATION) at 12:04

## 2024-04-02 RX ADMIN — FAMOTIDINE 20 MG: 10 INJECTION, SOLUTION INTRAVENOUS at 08:04

## 2024-04-02 RX ADMIN — METHOCARBAMOL 500 MG: 500 TABLET ORAL at 04:04

## 2024-04-02 RX ADMIN — PROPRANOLOL HYDROCHLORIDE 20 MG: 20 TABLET ORAL at 04:04

## 2024-04-02 RX ADMIN — BISACODYL 10 MG: 10 SUPPOSITORY RECTAL at 12:04

## 2024-04-02 RX ADMIN — IPRATROPIUM BROMIDE AND ALBUTEROL SULFATE 3 ML: 2.5; .5 SOLUTION RESPIRATORY (INHALATION) at 07:04

## 2024-04-02 RX ADMIN — Medication 2 TABLET: at 12:04

## 2024-04-02 RX ADMIN — GUAIFENESIN 200 MG: 200 SOLUTION ORAL at 05:04

## 2024-04-02 RX ADMIN — IPRATROPIUM BROMIDE AND ALBUTEROL SULFATE 3 ML: 2.5; .5 SOLUTION RESPIRATORY (INHALATION) at 08:04

## 2024-04-02 RX ADMIN — FUROSEMIDE 10 MG: 10 INJECTION, SOLUTION INTRAMUSCULAR; INTRAVENOUS at 08:04

## 2024-04-02 RX ADMIN — ACETAMINOPHEN 650 MG: 650 SOLUTION ORAL at 11:04

## 2024-04-02 RX ADMIN — DEXMEDETOMIDINE HYDROCHLORIDE 0.2 MCG/KG/HR: 400 INJECTION INTRAVENOUS at 01:04

## 2024-04-02 RX ADMIN — LEVETIRACETAM 500 MG: 100 INJECTION, SOLUTION INTRAVENOUS at 09:04

## 2024-04-02 RX ADMIN — GUAIFENESIN 200 MG: 200 SOLUTION ORAL at 12:04

## 2024-04-02 RX ADMIN — BACITRACIN: 500 OINTMENT TOPICAL at 08:04

## 2024-04-02 RX ADMIN — ACETAMINOPHEN 650 MG: 650 SOLUTION ORAL at 05:04

## 2024-04-02 RX ADMIN — DOXAZOSIN 1 MG: 1 TABLET ORAL at 08:04

## 2024-04-02 RX ADMIN — SODIUM CHLORIDE: 9 INJECTION, SOLUTION INTRAVENOUS at 09:04

## 2024-04-02 NOTE — PT/OT/SLP PROGRESS
Physical Therapy Treatment    Patient Name:  Anayeli Taylor   MRN:  [id number]    Recommendations:     Discharge therapy intensity: Moderate Intensity Therapy   Discharge Equipment Recommendations: to be determined by next level of care  Barriers to discharge: Decreased caregiver support, Impaired mobility, and Ongoing medical needs    Assessment:     Anayeli Taylor is a 73 y.o. male admitted with a medical diagnosis of SDH s/p crani; acute respiratory failure requiring intubation now extubated, R pneumothorax, rib fxs, R medial clavicle and acromion fx (non-op), right adrenal hematoma.  He presents with the following impairments/functional limitations: weakness, decreased upper extremity function, impaired endurance, impaired balance, decreased lower extremity function, impaired self care skills, impaired functional mobility, impaired cognition, decreased safety awareness. Pt tolerated session fair to poor, continues to resist all movement requiring Total A x 2 for all mobility. Pt with strong posterior lean in sitting and standing, increasing with resistance to movement. Periods of SBA at EOB when pt engaged and purposeful. Total A x 2 for pivot t/f to bedside, pt fighting throughout t/f. Decreasing POC to 3-5x/wk due to slow progression to goals and decreased participation in sessions. If pt improves and demonstrates participation in sessions can bump POC accordingly. Pt appropriate for enrique lift transfers to chair from staff to improve OOB tolerance.     Rehab Prognosis: Fair; patient would benefit from acute skilled PT services to address these deficits and reach maximum level of function.    Recent Surgery: Procedure(s) (LRB):  CRANIOTOMY, FOR SUBDURAL HEMATOMA EVACUATION-RIGHT (Right)  Insertion,central venous access device (Right) 6 Days Post-Op    Plan:     During this hospitalization, patient to be seen 3 -5 x/week to address the identified rehab impairments via therapeutic activities, therapeutic exercises,  neuromuscular re-education, gait training and progress toward the following goals:    Plan of Care Expires:  24    Subjective     Chief Complaint: unable to understand verbal complaints throughout session  Patient/Family Comments/goals: not stated   Pain/Comfort:  Pain Rating 1:  (not specifically rated throughout session)      Objective:     Communicated with RN prior to session.  Patient found HOB elevated with blood pressure cuff, peripheral IV, SCD, pressure relief boots, palmer catheter, pulse ox (continuous), telemetry, NG tube, oxygen upon PT entry to room.     General Precautions: Standard, fall (<160)  Orthopedic Precautions: RUE non weight bearing  Braces: UE Sling  Respiratory Status: Room air 95%  Blood Pressure: 137/74      Functional Mobility:  Bed Mobility:     Supine to Sit: total assistance and of 2 persons  Transfers:     Sit to Stand:  total assistance and of 2 persons with hand-held assist  Bed to Chair: total assistance and of 2 persons with  no AD  using  Squat Pivot  Balance: poor static sitting EOB/resisting movement, strong posterior lean despite cuing. Attempted to calm and reassure pt; however, pt continued to resist and mumble. On occasion, able to maintain static sitting with SBA when distracted from task at hand.       Education:  Patient provided with verbal education education regarding PT role/goals/POC, post-op precautions, fall prevention, and safety awareness.  Additional teaching is warranted.     Patient left up in chair with all lines intact, call button in reach, and enrique lift under pt, posey vest donned, RN notified.     GOALS:   Multidisciplinary Problems       Physical Therapy Goals          Problem: Physical Therapy    Goal Priority Disciplines Outcome Goal Variances Interventions   Physical Therapy Goal     PT, PT/OT Ongoing, Progressing     Description: Goals to be met by: 24     Patient will increase functional independence with mobility by performin.  Supine to sit with MInimal Assistance  2. Sit to supine with MInimal Assistance  3. Sit to stand transfer with Minimal Assistance  4. Bed to chair transfer with Minimal Assistance using LRAD  5. Gait TBD  6. Sitting at edge of bed x20 minutes with Minimal Assistance  7. Pt to follow 100% of commands                         Time Tracking:     PT Received On: 04/02/24  PT Start Time: 0846     PT Stop Time: 0910  PT Total Time (min): 24 min     Billable Minutes: Therapeutic Activity 24    Treatment Type: Treatment  PT/PTA: PT     Number of PTA visits since last PT visit: 2     04/02/2024

## 2024-04-02 NOTE — NURSING
Nurses Note -- 4 Eyes      4/2/2024   6:07 AM      Skin assessed during: Daily Assessment      [] No Altered Skin Integrity Present    [x]Prevention Measures Documented      [x] Yes- Altered Skin Integrity Present or Discovered   [] LDA Added if Not in Epic (Describe Wound)   [] New Altered Skin Integrity was Present on Admit and Documented in LDA   [] Wound Image Taken    Wound Care Consulted? No    Attending Nurse:  Nory Dawson RN/Staff Member:  GAGANDEEP Barrera

## 2024-04-02 NOTE — NURSING
Nurses Note -- 4 Eyes      4/2/2024   7:19 AM      Skin assessed during: Daily Assessment      [] No Altered Skin Integrity Present    [x]Prevention Measures Documented      [x] Yes- Altered Skin Integrity Present or Discovered   [] LDA Added if Not in Epic (Describe Wound)   [] New Altered Skin Integrity was Present on Admit and Documented in LDA   [] Wound Image Taken    Wound Care Consulted? No    Attending Nurse:  Dinora Dawson RN/Staff Member:  GAGANDEEP Ramey

## 2024-04-02 NOTE — PLAN OF CARE
Trauma rounds indicate pt will need neuro rehab. Spoke with pts daughter Eunice about neuro rehabs and locations. She really doesn't want [place]. She is in the [facility] so [facility] would be 1st choice for neuro rehab and [facility] 2nd.   Pt has temp 100.1 today. Speech not reviewed from today. PO not safe yet   Referral sent through [facility] to [facility] in [place]  Will follow

## 2024-04-02 NOTE — PT/OT/SLP PROGRESS
Attempts at PO intake unsafe at this time due to fatigue and tachypnea.  POC discussed with nursing and MD.  SLP to continue to follow and tx as appropriate.
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
## 2024-04-02 NOTE — PT/OT/SLP PROGRESS
Occupational Therapy   Treatment    Name: Anayeli Taylor  MRN: [id number]  Admitting Diagnosis:  Traumatic subdural hematoma (SDH)  6 Days Post-Op    Recommendations:     Recommended therapy intensity at discharge: Moderate Intensity Therapy   Discharge Equipment Recommendations:   (TBD)  Barriers to discharge:   (ongoing medical needs; severity of deficits)    Assessment:     Anayeli Taylor is a 73 y.o. male with a medical diagnosis of SDH s/p crani; acute respiratory failure requiring intubation now extubated, R pneumothorax, rib fxs, R medial clavicle and acromion fx (non-op), right adrenal hematoma. Performance deficits affecting function are weakness, impaired endurance, impaired self care skills, impaired functional mobility, gait instability, impaired balance, impaired cognition, decreased coordination, decreased upper extremity function, decreased lower extremity function, decreased safety awareness, orthopedic precautions. He presents with overall decreased participation within therapy session. Reluctantly performed transfer to the chair with total Ax2; will update POC accordingly as therapy progresses.    Rehab Prognosis:  Fair; patient would benefit from acute skilled OT services to address these deficits and reach maximum level of function.       Plan:     Patient to be seen 5 x/week to address the above listed problems via self-care/home management, therapeutic activities, therapeutic exercises  Plan of Care Expires: 04/30/24  Plan of Care Reviewed with: patient    Subjective     Pain/Comfort:  Pain Rating 1:  (endorses back pain with all movement)    Objective:     Communicated with: NSG prior to session.  Patient found HOB elevated with NG tube, oxygen, blood pressure cuff, palmer catheter, peripheral IV upon OT entry to room.    General Precautions: Standard, fall (SBP < 160)    Orthopedic Precautions:RUE non weight bearing (pendulum ROM ok, full ROM distally)  Braces: UE Sling  Respiratory Status: Room  air  Vital Signs: 137/74 89 bpm 97%     Occupational Performance:     Bed Mobility:    Patient completed Supine to Sit with total assistance and 2 persons   Sitting EOB with total A 2/2 severe posterior lean    Functional Mobility/Transfers:  Patient completed Sit <> Stand Transfer with total assistance and of 2 persons  with  hand-held assist   Patient completed Bed <> Chair Transfer using Squat Pivot technique with total assistance and of 2 persons with hand-held assist    Therapeutic Activities:  Attempting to get Pt to perform anterior and posterior weightshifting; Pt not performing and purposefully leaning posteriorly.     Therapeutic Positioning    OT interventions performed during the course of today's session in an effort to prevent and/or reduce acquired pressure injuries:   Education was provided on benefits of and recommendations for therapeutic positioning  Therapeutic positioning was provided at the conclusion of session to offload all bony prominences for the prevention and/or reduction of pressure injuries  Positioning recommendations were communicated to care team       Patient Education:  Patient provided with verbal education education regarding OT role/goals/POC, fall prevention, safety awareness, Discharge/DME recommendations, pressure ulcer prevention, and RUE precaution .  Additional teaching is warranted.      Patient left up in chair with all lines intact, call button in reach, enrique pad in place, NSG notified, and posey vest .    GOALS:   Multidisciplinary Problems       Occupational Therapy Goals          Problem: Occupational Therapy    Goal Priority Disciplines Outcome Interventions   Occupational Therapy Goal     OT, PT/OT Ongoing, Progressing    Description: LTG: Pt will perform basic ADLs and ADL t/fs with min A using LRAD by d/c.    STG: to be met in two weeks  1. Pt will follow >80% of simple one step commands  2. Pt will perform grooming EOB with min A.   3. Pt will perform  sit<>stand with mod A in prep for ADL t/fs.  4. Pt will perform functional mobility to/from toilet with min A using LRAD.                         Time Tracking:     OT Date of Treatment:    OT Start Time: 0846  OT Stop Time: 0907  OT Total Time (min): 21 min    Billable Minutes:Therapeutic Activity 1  Neuromuscular Re-education 1    OT/LAUREEN: OT     Number of LAUREEN visits since last OT visit: 2    4/2/2024

## 2024-04-03 LAB
ALBUMIN SERPL-MCNC: 2.2 G/DL (ref 3.4–4.8)
ALBUMIN/GLOB SERPL: 0.8 RATIO (ref 1.1–2)
ALP SERPL-CCNC: 109 UNIT/L (ref 40–150)
ALT SERPL-CCNC: 58 UNIT/L (ref 0–55)
AST SERPL-CCNC: 50 UNIT/L (ref 5–34)
AV INDEX (PROSTH): 0.82
AV MEAN GRADIENT: 6 MMHG
AV PEAK GRADIENT: 12 MMHG
AV VALVE AREA BY VELOCITY RATIO: 2.33 CM²
AV VALVE AREA: 2.57 CM²
AV VELOCITY RATIO: 0.74
BASOPHILS # BLD AUTO: 0.03 X10(3)/MCL
BASOPHILS NFR BLD AUTO: 0.3 %
BILIRUB SERPL-MCNC: 0.7 MG/DL
BSA FOR ECHO PROCEDURE: 1.6 M2
BUN SERPL-MCNC: 22.7 MG/DL (ref 8.4–25.7)
CALCIUM SERPL-MCNC: 8.1 MG/DL (ref 8.8–10)
CHLORIDE SERPL-SCNC: 110 MMOL/L (ref 98–107)
CO2 SERPL-SCNC: 26 MMOL/L (ref 23–31)
CREAT SERPL-MCNC: 0.69 MG/DL (ref 0.73–1.18)
CV ECHO LV RWT: 0.44 CM
DOP CALC AO PEAK VEL: 1.74 M/S
DOP CALC AO VTI: 25.8 CM
DOP CALC LVOT AREA: 3.1 CM2
DOP CALC LVOT DIAMETER: 2 CM
DOP CALC LVOT PEAK VEL: 1.29 M/S
DOP CALC LVOT STROKE VOLUME: 66.25 CM3
DOP CALC MV VTI: 23.6 CM
DOP CALCLVOT PEAK VEL VTI: 21.1 CM
E WAVE DECELERATION TIME: 169 MSEC
E/A RATIO: 0.94
E/E' RATIO: 10.89 M/S
ECHO LV POSTERIOR WALL: 1 CM (ref 0.6–1.1)
EOSINOPHIL # BLD AUTO: 0.25 X10(3)/MCL (ref 0–0.9)
EOSINOPHIL NFR BLD AUTO: 2.2 %
ERYTHROCYTE [DISTWIDTH] IN BLOOD BY AUTOMATED COUNT: 15.4 % (ref 11.5–17)
FRACTIONAL SHORTENING: 33 % (ref 28–44)
GFR SERPLBLD CREATININE-BSD FMLA CKD-EPI: >60 MLS/MIN/1.73/M2
GLOBULIN SER-MCNC: 2.9 GM/DL (ref 2.4–3.5)
GLUCOSE SERPL-MCNC: 169 MG/DL (ref 82–115)
HCT VFR BLD AUTO: 25.5 % (ref 42–52)
HGB BLD-MCNC: 8.4 G/DL (ref 14–18)
IMM GRANULOCYTES # BLD AUTO: 0.12 X10(3)/MCL (ref 0–0.04)
IMM GRANULOCYTES NFR BLD AUTO: 1.1 %
INTERVENTRICULAR SEPTUM: 1.2 CM (ref 0.6–1.1)
LEFT ATRIUM SIZE: 4.3 CM
LEFT ATRIUM VOLUME INDEX MOD: 36.4 ML/M2
LEFT ATRIUM VOLUME MOD: 58.9 CM3
LEFT INTERNAL DIMENSION IN SYSTOLE: 3 CM (ref 2.1–4)
LEFT VENTRICLE DIASTOLIC VOLUME INDEX: 57.04 ML/M2
LEFT VENTRICLE DIASTOLIC VOLUME: 92.4 ML
LEFT VENTRICLE MASS INDEX: 108 G/M2
LEFT VENTRICLE SYSTOLIC VOLUME INDEX: 21.6 ML/M2
LEFT VENTRICLE SYSTOLIC VOLUME: 35 ML
LEFT VENTRICULAR INTERNAL DIMENSION IN DIASTOLE: 4.5 CM (ref 3.5–6)
LEFT VENTRICULAR MASS: 175.02 G
LV LATERAL E/E' RATIO: 8.17 M/S
LV SEPTAL E/E' RATIO: 16.33 M/S
LVOT MG: 4 MMHG
LVOT MV: 0.87 CM/S
LYMPHOCYTES # BLD AUTO: 1.6 X10(3)/MCL (ref 0.6–4.6)
LYMPHOCYTES NFR BLD AUTO: 14.3 %
MAGNESIUM SERPL-MCNC: 2.2 MG/DL (ref 1.6–2.6)
MCH RBC QN AUTO: 30.3 PG (ref 27–31)
MCHC RBC AUTO-ENTMCNC: 32.9 G/DL (ref 33–36)
MCV RBC AUTO: 92.1 FL (ref 80–94)
MONOCYTES # BLD AUTO: 0.98 X10(3)/MCL (ref 0.1–1.3)
MONOCYTES NFR BLD AUTO: 8.8 %
MV MEAN GRADIENT: 2 MMHG
MV PEAK A VEL: 1.04 M/S
MV PEAK E VEL: 0.98 M/S
MV PEAK GRADIENT: 4 MMHG
MV VALVE AREA BY CONTINUITY EQUATION: 2.81 CM2
NEUTROPHILS # BLD AUTO: 8.17 X10(3)/MCL (ref 2.1–9.2)
NEUTROPHILS NFR BLD AUTO: 73.3 %
NRBC BLD AUTO-RTO: 0.2 %
OHS LV EJECTION FRACTION SIMPSONS BIPLANE MOD: 64 %
PHOSPHATE SERPL-MCNC: 2.4 MG/DL (ref 2.3–4.7)
PLATELET # BLD AUTO: 236 X10(3)/MCL (ref 130–400)
PMV BLD AUTO: 10.5 FL (ref 7.4–10.4)
POTASSIUM SERPL-SCNC: 3.9 MMOL/L (ref 3.5–5.1)
PROT SERPL-MCNC: 5.1 GM/DL (ref 5.8–7.6)
RA MAJOR: 4.8 CM
RA PRESSURE ESTIMATED: 3 MMHG
RA WIDTH: 3.4 CM
RBC # BLD AUTO: 2.77 X10(6)/MCL (ref 4.7–6.1)
RIGHT VENTRICULAR END-DIASTOLIC DIMENSION: 4 CM
SODIUM SERPL-SCNC: 143 MMOL/L (ref 136–145)
TDI LATERAL: 0.12 M/S
TDI SEPTAL: 0.06 M/S
TDI: 0.09 M/S
TROPONIN I SERPL-MCNC: 0.2 NG/ML (ref 0–0.04)
TROPONIN I SERPL-MCNC: <0.01 NG/ML (ref 0–0.04)
TROPONIN I SERPL-MCNC: <0.01 NG/ML (ref 0–0.04)
WBC # SPEC AUTO: 11.15 X10(3)/MCL (ref 4.5–11.5)
Z-SCORE OF LEFT VENTRICULAR DIMENSION IN END DIASTOLE: -0.18
Z-SCORE OF LEFT VENTRICULAR DIMENSION IN END SYSTOLE: 0.43

## 2024-04-03 PROCEDURE — 94640 AIRWAY INHALATION TREATMENT: CPT

## 2024-04-03 PROCEDURE — A9698 NON-RAD CONTRAST MATERIALNOC: HCPCS | Performed by: SURGERY

## 2024-04-03 PROCEDURE — 99223 1ST HOSP IP/OBS HIGH 75: CPT | Mod: ,,, | Performed by: SURGERY

## 2024-04-03 PROCEDURE — 80053 COMPREHEN METABOLIC PANEL: CPT | Performed by: NURSE PRACTITIONER

## 2024-04-03 PROCEDURE — 84484 ASSAY OF TROPONIN QUANT: CPT | Performed by: NURSE PRACTITIONER

## 2024-04-03 PROCEDURE — 85025 COMPLETE CBC W/AUTO DIFF WBC: CPT | Performed by: NURSE PRACTITIONER

## 2024-04-03 PROCEDURE — 99900035 HC TECH TIME PER 15 MIN (STAT)

## 2024-04-03 PROCEDURE — 11000001 HC ACUTE MED/SURG PRIVATE ROOM

## 2024-04-03 PROCEDURE — 84100 ASSAY OF PHOSPHORUS: CPT | Performed by: NURSE PRACTITIONER

## 2024-04-03 PROCEDURE — 25000242 PHARM REV CODE 250 ALT 637 W/ HCPCS: Performed by: NURSE PRACTITIONER

## 2024-04-03 PROCEDURE — 25000003 PHARM REV CODE 250: Performed by: NURSE PRACTITIONER

## 2024-04-03 PROCEDURE — 83735 ASSAY OF MAGNESIUM: CPT | Performed by: NURSE PRACTITIONER

## 2024-04-03 PROCEDURE — 94664 DEMO&/EVAL PT USE INHALER: CPT

## 2024-04-03 PROCEDURE — 84484 ASSAY OF TROPONIN QUANT: CPT

## 2024-04-03 PROCEDURE — 63600175 PHARM REV CODE 636 W HCPCS: Performed by: NURSE PRACTITIONER

## 2024-04-03 PROCEDURE — 27100171 HC OXYGEN HIGH FLOW UP TO 24 HOURS

## 2024-04-03 PROCEDURE — 97535 SELF CARE MNGMENT TRAINING: CPT

## 2024-04-03 PROCEDURE — 94660 CPAP INITIATION&MGMT: CPT

## 2024-04-03 PROCEDURE — 92611 MOTION FLUOROSCOPY/SWALLOW: CPT

## 2024-04-03 PROCEDURE — 25000242 PHARM REV CODE 250 ALT 637 W/ HCPCS: Performed by: SURGERY

## 2024-04-03 PROCEDURE — 94760 N-INVAS EAR/PLS OXIMETRY 1: CPT

## 2024-04-03 PROCEDURE — 93005 ELECTROCARDIOGRAM TRACING: CPT

## 2024-04-03 PROCEDURE — 99900031 HC PATIENT EDUCATION (STAT)

## 2024-04-03 PROCEDURE — 93010 ELECTROCARDIOGRAM REPORT: CPT | Mod: ,,, | Performed by: INTERNAL MEDICINE

## 2024-04-03 PROCEDURE — 25500020 PHARM REV CODE 255: Performed by: SURGERY

## 2024-04-03 PROCEDURE — 25000003 PHARM REV CODE 250: Performed by: SURGERY

## 2024-04-03 PROCEDURE — 97530 THERAPEUTIC ACTIVITIES: CPT | Mod: CQ

## 2024-04-03 RX ORDER — METHOCARBAMOL 500 MG/1
500 TABLET, FILM COATED ORAL 3 TIMES DAILY
Status: DISCONTINUED | OUTPATIENT
Start: 2024-04-03 | End: 2024-04-15

## 2024-04-03 RX ORDER — FUROSEMIDE 10 MG/ML
40 INJECTION INTRAMUSCULAR; INTRAVENOUS ONCE
Status: COMPLETED | OUTPATIENT
Start: 2024-04-03 | End: 2024-04-03

## 2024-04-03 RX ADMIN — ACETAMINOPHEN 650 MG: 650 SOLUTION ORAL at 05:04

## 2024-04-03 RX ADMIN — BACITRACIN: 500 OINTMENT TOPICAL at 03:04

## 2024-04-03 RX ADMIN — Medication 6 MG: at 09:04

## 2024-04-03 RX ADMIN — THERA TABS 1 TABLET: TAB at 08:04

## 2024-04-03 RX ADMIN — FUROSEMIDE 40 MG: 10 INJECTION, SOLUTION INTRAMUSCULAR; INTRAVENOUS at 08:04

## 2024-04-03 RX ADMIN — IPRATROPIUM BROMIDE AND ALBUTEROL SULFATE 3 ML: 2.5; .5 SOLUTION RESPIRATORY (INHALATION) at 08:04

## 2024-04-03 RX ADMIN — BACITRACIN: 500 OINTMENT TOPICAL at 09:04

## 2024-04-03 RX ADMIN — PROPRANOLOL HYDROCHLORIDE 20 MG: 20 TABLET ORAL at 06:04

## 2024-04-03 RX ADMIN — METHOCARBAMOL 500 MG: 500 TABLET ORAL at 09:04

## 2024-04-03 RX ADMIN — CEFEPIME 2 G: 2 INJECTION, POWDER, FOR SOLUTION INTRAVENOUS at 10:04

## 2024-04-03 RX ADMIN — GUAIFENESIN 200 MG: 200 SOLUTION ORAL at 12:04

## 2024-04-03 RX ADMIN — ENOXAPARIN SODIUM 40 MG: 40 INJECTION SUBCUTANEOUS at 09:04

## 2024-04-03 RX ADMIN — LIDOCAINE 5% 2 PATCH: 700 PATCH TOPICAL at 12:04

## 2024-04-03 RX ADMIN — IPRATROPIUM BROMIDE AND ALBUTEROL SULFATE 3 ML: 2.5; .5 SOLUTION RESPIRATORY (INHALATION) at 07:04

## 2024-04-03 RX ADMIN — CEFEPIME 2 G: 2 INJECTION, POWDER, FOR SOLUTION INTRAVENOUS at 06:04

## 2024-04-03 RX ADMIN — DOCUSATE SODIUM LIQUID 100 MG: 100 LIQUID ORAL at 09:04

## 2024-04-03 RX ADMIN — BARIUM SULFATE 10 ML: 0.81 POWDER, FOR SUSPENSION ORAL at 02:04

## 2024-04-03 RX ADMIN — QUETIAPINE FUMARATE 12.5 MG: 25 TABLET ORAL at 09:04

## 2024-04-03 RX ADMIN — ACETAMINOPHEN 650 MG: 650 SOLUTION ORAL at 06:04

## 2024-04-03 RX ADMIN — GUAIFENESIN 200 MG: 200 SOLUTION ORAL at 05:04

## 2024-04-03 RX ADMIN — Medication 2 TABLET: at 08:04

## 2024-04-03 RX ADMIN — ENOXAPARIN SODIUM 40 MG: 40 INJECTION SUBCUTANEOUS at 08:04

## 2024-04-03 RX ADMIN — BACITRACIN: 500 OINTMENT TOPICAL at 08:04

## 2024-04-03 RX ADMIN — PROPRANOLOL HYDROCHLORIDE 20 MG: 20 TABLET ORAL at 10:04

## 2024-04-03 RX ADMIN — DOXAZOSIN 1 MG: 1 TABLET ORAL at 10:04

## 2024-04-03 RX ADMIN — IPRATROPIUM BROMIDE AND ALBUTEROL SULFATE 3 ML: 2.5; .5 SOLUTION RESPIRATORY (INHALATION) at 01:04

## 2024-04-03 RX ADMIN — POLYETHYLENE GLYCOL 3350 17 G: 17 POWDER, FOR SOLUTION ORAL at 09:04

## 2024-04-03 RX ADMIN — GUAIFENESIN 200 MG: 200 SOLUTION ORAL at 06:04

## 2024-04-03 RX ADMIN — PROPRANOLOL HYDROCHLORIDE 20 MG: 20 TABLET ORAL at 09:04

## 2024-04-03 RX ADMIN — PROPRANOLOL HYDROCHLORIDE 20 MG: 20 TABLET ORAL at 03:04

## 2024-04-03 NOTE — PLAN OF CARE
Matilda from [facility] in [place] confirms they will be following pt: temp and participation with therapy feeling he is not quite ready yet. Will send updates tomorrow afternoon
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
## 2024-04-03 NOTE — PROGRESS NOTES
[facility] - [address] [facility]  General Surgery  Progress Note    Subjective:     Interval History:     Fever 100.3 today. Patient has productive cough. Xray showing worsening right sided pleural effusion.       Post-Op Info:  Procedure(s) (LRB):  CRANIOTOMY, FOR SUBDURAL HEMATOMA EVACUATION-RIGHT (Right)  Insertion,central venous access device (Right)   7 Days Post-Op      Medications:  Continuous Infusions:  Scheduled Meds:   acetaminophen  650 mg Per NG tube Q6H    albuterol-ipratropium  3 mL Nebulization Q6H    bacitracin   Topical (Top) TID    calcium-vitamin D3  2 tablet Oral Daily    ceFEPime IV (PEDS and ADULTS)  2 g Intravenous Q8H    docusate  100 mg Per NG tube BID    doxazosin  1 mg Per NG tube Daily    enoxparin  40 mg Subcutaneous Q12H (prophylaxis, 0900/2100)    guaiFENesin 100 mg/5 ml  200 mg Per NG tube Q6H    LIDOcaine  2 patch Transdermal Q24H    methocarbamoL  500 mg Per NG tube TID    multivitamin  1 tablet Per NG tube Daily    polyethylene glycol  17 g Per NG tube BID    propranoloL  20 mg Per NG tube TID    QUEtiapine  12.5 mg Oral QHS    senna  8.6 mg Per NG tube Daily     PRN Meds:bisacodyL, hydrALAZINE, labetaloL, LORazepam, melatonin, ondansetron     Objective:     Vital Signs (Most Recent):  Temp: 100.3 °F (37.9 °C) (04/03/24 0900)  Pulse: 97 (04/03/24 1000)  Resp: 20 (04/03/24 0726)  BP: (!) 116/59 (04/03/24 1000)  SpO2: 97 % (04/03/24 0726) Vital Signs (24h Range):  Temp:  [98.7 °F (37.1 °C)-100.3 °F (37.9 °C)] 100.3 °F (37.9 °C)  Pulse:  [] 97  Resp:  [17-35] 20  SpO2:  [97 %-100 %] 97 %  BP: (116-164)/(56-86) 116/59       Intake/Output Summary (Last 24 hours) at 4/3/2024 1110  Last data filed at 4/3/2024 1043  Gross per 24 hour   Intake 2006 ml   Output 3100 ml   Net -1094 ml       Physical Exam  Constitutional:       Comments: Sitting in bed, awake, able to follow commands   HENT:      Head:      Comments: Scalp incisions - clean and dry  Contusions right face/  right periorbital edema/ecchymosis    Cardiovascular:      Rate and Rhythm: Normal rate and regular rhythm.   Pulmonary:      Comments: Right clavicle deformity (present on admission secondary to right clavicle fracture)  Productive cough  Musculoskeletal:      Comments: Bilateral upper extremity 3+ pitting edema         Significant Labs:  All pertinent labs from the last 24 hours have been reviewed.    Significant Diagnostics:  I have reviewed all pertinent imaging results/findings within the past 24 hours.    Assessment/Plan:     Active Diagnoses:    Diagnosis Date Noted POA    PRINCIPAL PROBLEM:  Traumatic subdural hematoma (SDH) [S06.5XAA] 03/27/2024 Yes    Hemopneumothorax on right [J94.2] 04/02/2024 Yes    Contusion of right lung [S27.321A] 04/02/2024 Yes    Adrenal hemorrhage [E27.49] 04/02/2024 Yes    Scalp laceration [S01.01XA] 04/02/2024 Yes    Elbow laceration, left, initial encounter [S51.012A] 04/02/2024 Yes    Acute blood loss anemia [D62] 03/29/2024 Yes    Acute hypoxemic respiratory failure [J96.01] 03/27/2024 No    Aspiration pneumonia due to gastric secretions [J69.0] 03/27/2024 No    Closed fracture of multiple ribs [S22.49XA] 03/27/2024 Yes    Closed nondisplaced fracture of sternal end of right clavicle [S42.017A] 03/27/2024 Yes      Problems Resolved During this Admission:     Neuro  TBI/worsening subdural hematoma with mass effect and midline shift s/p emergent craniotomy 3/27/2024 - Continue serial neuro checks, HOB >30 degrees, Keppra.  More alert this morning  Plan per TBI protocol  Promote sleep-wake cycles, out of bed, lights on in the morning  Multimodal pain control  Remove traumatic staples from scalp laceration tomorrow    Respiratory  Acute respiratory failure secondary to trauma - extubated 03/29/2024  Right pneumothorax (10% on CT) with atelectasis, multiple right-sided fractures (1st - 9th ribs) - multimodal pain control.    Febrile today  Empiric cefepime started  Slightly more  blunting of the right costophrenic angle and more haziness at the right base.   Will consider thoracentesis today  Lasix 40 mg given    Cardiovascular  Troponin elevated today; trend troponin  Echocardiogram     GI  Tube feeds, bowel regimen. BM x1 yesterday per nursing staff  Swallow evaluation      Renal  CMP stable  Lasix given today; continue to monitor I/Os  Continue palmer    Heme  Transfuse if hemoglobin less than 7 or patient with signs of active bleeding     Prophylaxis  DVT prophylaxis - SCDs, Lovenox  Bowel regimen    Josue Thao MD  General Surgery  [facility] - [address] [place] [address]

## 2024-04-03 NOTE — PLAN OF CARE
Problem: SLP  Goal: SLP Goal  Description: LTG: Pt will tolerate least restrictive PO diet with no clinical signs/sx    ST. Pt will tolerate ice chips with no signs/sx of aspiration - continue  2. Pt will tolerate puree solids with no signs/sx of aspiration - continue  3. Tolerate thermal stimulation to the anterior faucial pillars x10 with 100% effortful swallow responses and delay less than 2 seconds.   Outcome: Ongoing, Progressing
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
## 2024-04-03 NOTE — PT/OT/SLP PROGRESS
Physical Therapy Treatment    Patient Name:  Anayeli Taylor   MRN:  [id number]    Recommendations:     Discharge therapy intensity: Moderate Intensity Therapy   Discharge Equipment Recommendations: to be determined by next level of care  Barriers to discharge: Decreased caregiver support, Impaired mobility, and Ongoing medical needs    Assessment:     Anayeli Taylor is a 73 y.o. male admitted with a medical diagnosis of SDH s/p crani; acute respiratory failure requiring intubation now extubated, R pneumothorax, rib fxs, R medial clavicle and acromion fx (non-op), right adrenal hematoma.  He presents with the following impairments/functional limitations: weakness, decreased upper extremity function, impaired endurance, impaired balance, decreased lower extremity function, impaired self care skills, impaired functional mobility, impaired cognition, decreased safety awareness.    Patient displayed decreased motivation to participate with therapy. Patient assisted to chair requiring total assistance x2.     Rehab Prognosis: Good; patient would benefit from acute skilled PT services to address these deficits and reach maximum level of function.    Recent Surgery: Procedure(s) (LRB):  CRANIOTOMY, FOR SUBDURAL HEMATOMA EVACUATION-RIGHT (Right)  Insertion,central venous access device (Right) 7 Days Post-Op    Plan:     During this hospitalization, patient to be seen 3 x/week to address the identified rehab impairments via therapeutic activities, therapeutic exercises, neuromuscular re-education, gait training and progress toward the following goals:    Plan of Care Expires:  04/30/24    Subjective     Chief Complaint: complaints of back pain  Patient/Family Comments/goals: none stated  Pain/Comfort:  Pain Rating 1: other (see comments) (unable to rate)  Location - Side 1: Bilateral  Location - Orientation 1: generalized  Location 1: back  Pain Addressed 1: Reposition, Cessation of Activity, Nurse notified      Objective:      Communicated with nurse prior to session.  Patient found HOB elevated with telemetry, pulse ox (continuous), peripheral IV, pressure relief boots, oxygen, palmer catheter, blood pressure cuff upon PT entry to room.     General Precautions: Standard, fall, other (see comments) (SBP <160)  Orthopedic Precautions: RUE non weight bearing  Braces: UE Sling  Respiratory Status: Room air  Blood Pressure: 121/61mmHg, HR: 94bpm, SPO2: 97%  Skin Integrity: Visible skin intact      Functional Mobility:  Bed Mobility:     Supine to Sit: total assistance and of 2 persons  Transfers:     Sit to Stand:  total assistance and of 2 persons with hand-held assist  Bed to Chair: total assistance and of 2 persons with  hand-held assist  using  Squat Pivot    Therapeutic Activities/Exercises:  Patient performed static sitting edge of bed requiring maxA-total assistance. Patient presents with posterior lean.    Education:  Patient provided with verbal education education regarding PT role/goals/POC, post-op precautions, fall prevention, safety awareness, and pressure ulcer prevention.  Understanding was verbalized, however additional teaching warranted.     Patient left up in chair with all lines intact, call button in reach, enrique pad in place, nurse notified, and posey vest    GOALS:   Multidisciplinary Problems       Physical Therapy Goals          Problem: Physical Therapy    Goal Priority Disciplines Outcome Goal Variances Interventions   Physical Therapy Goal     PT, PT/OT Ongoing, Progressing     Description: Goals to be met by: 24     Patient will increase functional independence with mobility by performin. Supine to sit with MInimal Assistance  2. Sit to supine with MInimal Assistance  3. Sit to stand transfer with Minimal Assistance  4. Bed to chair transfer with Minimal Assistance using LRAD  5. Gait TBD  6. Sitting at edge of bed x20 minutes with Minimal Assistance  7. Pt to follow 100% of commands                          Time Tracking:     PT Received On: 04/03/24  PT Start Time: 1058     PT Stop Time: 1122  PT Total Time (min): 24 min     Billable Minutes: Therapeutic Activity 24    Treatment Type: Treatment  PT/PTA: PTA     Number of PTA visits since last PT visit: 3     04/03/2024
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
## 2024-04-03 NOTE — PT/OT/SLP PROGRESS
Occupational Therapy   Treatment    Name: Anayeli Taylor  MRN: [id number]  Admitting Diagnosis:  Traumatic subdural hematoma (SDH)  7 Days Post-Op    Recommendations:     Recommended therapy intensity at discharge: Moderate Intensity Therapy   Discharge Equipment Recommendations:   (TBD)  Barriers to discharge:   (ongoing medical needs; severity of deficits)    Assessment:     Anayeli Taylor is a 73 y.o. male with a medical diagnosis of SDH s/p crani; acute respiratory failure requiring intubation now extubated, R pneumothorax, rib fxs, R medial clavicle and acromion fx (non-op), right adrenal hematoma. Performance deficits affecting function are weakness, impaired endurance, impaired self care skills, impaired functional mobility, gait instability, impaired balance, impaired cognition, decreased coordination, decreased upper extremity function, decreased lower extremity function, decreased safety awareness, orthopedic precautions. He presents with overall decreased participation within therapy session. Reluctantly performed transfer to the chair with total Ax2; will update POC accordingly as therapy progresses.    Rehab Prognosis:  Fair; patient would benefit from acute skilled OT services to address these deficits and reach maximum level of function.       Plan:     Patient to be seen 5 x/week to address the above listed problems via self-care/home management, therapeutic activities, therapeutic exercises  Plan of Care Expires: 04/30/24  Plan of Care Reviewed with: patient    Subjective     Pain/Comfort:  Pain Rating 1:  (endorses back pain with sitting EOB)  Pain Addressed 1: Reposition, Nurse notified, Distraction    Objective:     Communicated with: NSG prior to session.  Patient found HOB elevated with NG tube, oxygen, blood pressure cuff, palmer catheter, peripheral IV upon OT entry to room.    General Precautions: Standard, fall (SBP < 160)    Orthopedic Precautions:RUE non weight bearing (pendulum ROM ok, full ROM  distally)  Braces: UE Sling  Respiratory Status: Room air  Vital Signs: 137/74 89 bpm 97%     Occupational Performance:     Bed Mobility:    Patient completed Supine to Sit with total assistance and 2 persons   Sitting EOB with total A 2/2 severe posterior lean    Functional Mobility/Transfers:  Patient completed Sit <> Stand Transfer with total assistance and of 2 persons  with  hand-held assist   Patient completed Bed <> Chair Transfer using Squat Pivot technique with total assistance and of 2 persons with hand-held assist    Activities of Daily Living:  Grooming: required cues to initiate task however performed facewashing seated in bedside chair with RUE for excursion. Only able to reach mouth, required assist for all other facial features    Therapeutic Positioning    OT interventions performed during the course of today's session in an effort to prevent and/or reduce acquired pressure injuries:   Education was provided on benefits of and recommendations for therapeutic positioning  Therapeutic positioning was provided at the conclusion of session to offload all bony prominences for the prevention and/or reduction of pressure injuries  Positioning recommendations were communicated to care team   **Will likely need specialty bed**    Patient Education:  Patient provided with verbal education education regarding OT role/goals/POC, fall prevention, safety awareness, Discharge/DME recommendations, pressure ulcer prevention, and RUE precaution .  Additional teaching is warranted.      Patient left up in chair with all lines intact, call button in reach, enrique pad in place, NSG notified, and posey vest .    GOALS:   Multidisciplinary Problems       Occupational Therapy Goals          Problem: Occupational Therapy    Goal Priority Disciplines Outcome Interventions   Occupational Therapy Goal     OT, PT/OT Ongoing, Progressing    Description: LTG: Pt will perform basic ADLs and ADL t/fs with min A using LRAD by  d/c.    STG: to be met in two weeks  1. Pt will follow >80% of simple one step commands  2. Pt will perform grooming EOB with min A.   3. Pt will perform sit<>stand with mod A in prep for ADL t/fs.  4. Pt will perform functional mobility to/from toilet with min A using LRAD.                         Time Tracking:     OT Date of Treatment:    OT Start Time: 1054  OT Stop Time: 1118  OT Total Time (min): 24 min    Billable Minutes: Self Care Management 2    OT/LAUREEN: OT     Number of ALUREEN visits since last OT visit: 3    4/3/2024

## 2024-04-03 NOTE — PLAN OF CARE
Update sent through care port to [facility] and voice message left on rommel number requesting confirmation they received and if there is any else we can provide

## 2024-04-04 LAB
ALBUMIN SERPL-MCNC: 2.1 G/DL (ref 3.4–4.8)
ALBUMIN/GLOB SERPL: 0.7 RATIO (ref 1.1–2)
ALP SERPL-CCNC: 114 UNIT/L (ref 40–150)
ALT SERPL-CCNC: 52 UNIT/L (ref 0–55)
AST SERPL-CCNC: 46 UNIT/L (ref 5–34)
BASOPHILS # BLD AUTO: 0.04 X10(3)/MCL
BASOPHILS NFR BLD AUTO: 0.3 %
BILIRUB SERPL-MCNC: 0.7 MG/DL
BUN SERPL-MCNC: 27.1 MG/DL (ref 8.4–25.7)
CALCIUM SERPL-MCNC: 8 MG/DL (ref 8.8–10)
CHLORIDE SERPL-SCNC: 107 MMOL/L (ref 98–107)
CO2 SERPL-SCNC: 27 MMOL/L (ref 23–31)
CREAT SERPL-MCNC: 0.79 MG/DL (ref 0.73–1.18)
CRP SERPL-MCNC: 275.7 MG/L
EOSINOPHIL # BLD AUTO: 0.29 X10(3)/MCL (ref 0–0.9)
EOSINOPHIL NFR BLD AUTO: 2.3 %
ERYTHROCYTE [DISTWIDTH] IN BLOOD BY AUTOMATED COUNT: 15.7 % (ref 11.5–17)
GFR SERPLBLD CREATININE-BSD FMLA CKD-EPI: >60 MLS/MIN/1.73/M2
GLOBULIN SER-MCNC: 3.1 GM/DL (ref 2.4–3.5)
GLUCOSE SERPL-MCNC: 178 MG/DL (ref 82–115)
HCT VFR BLD AUTO: 24 % (ref 42–52)
HGB BLD-MCNC: 7.8 G/DL (ref 14–18)
IMM GRANULOCYTES # BLD AUTO: 0.09 X10(3)/MCL (ref 0–0.04)
IMM GRANULOCYTES NFR BLD AUTO: 0.7 %
LYMPHOCYTES # BLD AUTO: 1.71 X10(3)/MCL (ref 0.6–4.6)
LYMPHOCYTES NFR BLD AUTO: 13.5 %
MAGNESIUM SERPL-MCNC: 2.4 MG/DL (ref 1.6–2.6)
MCH RBC QN AUTO: 30.6 PG (ref 27–31)
MCHC RBC AUTO-ENTMCNC: 32.5 G/DL (ref 33–36)
MCV RBC AUTO: 94.1 FL (ref 80–94)
MONOCYTES # BLD AUTO: 0.9 X10(3)/MCL (ref 0.1–1.3)
MONOCYTES NFR BLD AUTO: 7.1 %
MRSA PCR SCRN (OHS): NOT DETECTED
NEUTROPHILS # BLD AUTO: 9.68 X10(3)/MCL (ref 2.1–9.2)
NEUTROPHILS NFR BLD AUTO: 76.1 %
NRBC BLD AUTO-RTO: 0.2 %
OHS QRS DURATION: 82 MS
OHS QTC CALCULATION: 442 MS
PHOSPHATE SERPL-MCNC: 2.6 MG/DL (ref 2.3–4.7)
PLATELET # BLD AUTO: 314 X10(3)/MCL (ref 130–400)
PMV BLD AUTO: 10.2 FL (ref 7.4–10.4)
POTASSIUM SERPL-SCNC: 3.8 MMOL/L (ref 3.5–5.1)
PREALB SERPL-MCNC: 8.5 MG/DL (ref 16–42)
PROT SERPL-MCNC: 5.2 GM/DL (ref 5.8–7.6)
RBC # BLD AUTO: 2.55 X10(6)/MCL (ref 4.7–6.1)
SODIUM SERPL-SCNC: 142 MMOL/L (ref 136–145)
WBC # SPEC AUTO: 12.71 X10(3)/MCL (ref 4.5–11.5)

## 2024-04-04 PROCEDURE — 25000003 PHARM REV CODE 250: Performed by: NURSE PRACTITIONER

## 2024-04-04 PROCEDURE — 25000003 PHARM REV CODE 250

## 2024-04-04 PROCEDURE — 27000249 HC VAPOTHERM CIRCUIT

## 2024-04-04 PROCEDURE — 94799 UNLISTED PULMONARY SVC/PX: CPT

## 2024-04-04 PROCEDURE — 84100 ASSAY OF PHOSPHORUS: CPT | Performed by: NURSE PRACTITIONER

## 2024-04-04 PROCEDURE — 99900035 HC TECH TIME PER 15 MIN (STAT)

## 2024-04-04 PROCEDURE — 97530 THERAPEUTIC ACTIVITIES: CPT | Mod: CQ

## 2024-04-04 PROCEDURE — 27100171 HC OXYGEN HIGH FLOW UP TO 24 HOURS

## 2024-04-04 PROCEDURE — 86140 C-REACTIVE PROTEIN: CPT | Performed by: NURSE PRACTITIONER

## 2024-04-04 PROCEDURE — 63600175 PHARM REV CODE 636 W HCPCS: Performed by: NURSE PRACTITIONER

## 2024-04-04 PROCEDURE — 92526 ORAL FUNCTION THERAPY: CPT

## 2024-04-04 PROCEDURE — 94660 CPAP INITIATION&MGMT: CPT

## 2024-04-04 PROCEDURE — 11000001 HC ACUTE MED/SURG PRIVATE ROOM

## 2024-04-04 PROCEDURE — 94760 N-INVAS EAR/PLS OXIMETRY 1: CPT

## 2024-04-04 PROCEDURE — 97535 SELF CARE MNGMENT TRAINING: CPT

## 2024-04-04 PROCEDURE — 80053 COMPREHEN METABOLIC PANEL: CPT | Performed by: NURSE PRACTITIONER

## 2024-04-04 PROCEDURE — 99223 1ST HOSP IP/OBS HIGH 75: CPT | Mod: ,,, | Performed by: SURGERY

## 2024-04-04 PROCEDURE — 87641 MR-STAPH DNA AMP PROBE: CPT | Performed by: NURSE PRACTITIONER

## 2024-04-04 PROCEDURE — 83735 ASSAY OF MAGNESIUM: CPT | Performed by: NURSE PRACTITIONER

## 2024-04-04 PROCEDURE — 25000242 PHARM REV CODE 250 ALT 637 W/ HCPCS: Performed by: NURSE PRACTITIONER

## 2024-04-04 PROCEDURE — 84134 ASSAY OF PREALBUMIN: CPT | Performed by: NURSE PRACTITIONER

## 2024-04-04 PROCEDURE — 94668 MNPJ CHEST WALL SBSQ: CPT

## 2024-04-04 PROCEDURE — 94640 AIRWAY INHALATION TREATMENT: CPT

## 2024-04-04 PROCEDURE — 25000003 PHARM REV CODE 250: Performed by: SURGERY

## 2024-04-04 PROCEDURE — 85025 COMPLETE CBC W/AUTO DIFF WBC: CPT | Performed by: NURSE PRACTITIONER

## 2024-04-04 PROCEDURE — 94664 DEMO&/EVAL PT USE INHALER: CPT

## 2024-04-04 RX ORDER — OXYCODONE HYDROCHLORIDE 5 MG/1
5 TABLET ORAL EVERY 4 HOURS PRN
Status: DISCONTINUED | OUTPATIENT
Start: 2024-04-04 | End: 2024-04-15

## 2024-04-04 RX ADMIN — GUAIFENESIN 200 MG: 200 SOLUTION ORAL at 12:04

## 2024-04-04 RX ADMIN — DOXAZOSIN 1 MG: 1 TABLET ORAL at 08:04

## 2024-04-04 RX ADMIN — OXYCODONE HYDROCHLORIDE 5 MG: 5 TABLET ORAL at 04:04

## 2024-04-04 RX ADMIN — POLYETHYLENE GLYCOL 3350 17 G: 17 POWDER, FOR SOLUTION ORAL at 08:04

## 2024-04-04 RX ADMIN — IPRATROPIUM BROMIDE AND ALBUTEROL SULFATE 3 ML: 2.5; .5 SOLUTION RESPIRATORY (INHALATION) at 08:04

## 2024-04-04 RX ADMIN — METHOCARBAMOL 500 MG: 500 TABLET ORAL at 08:04

## 2024-04-04 RX ADMIN — BACITRACIN: 500 OINTMENT TOPICAL at 08:04

## 2024-04-04 RX ADMIN — DOCUSATE SODIUM LIQUID 100 MG: 100 LIQUID ORAL at 08:04

## 2024-04-04 RX ADMIN — PROPRANOLOL HYDROCHLORIDE 20 MG: 20 TABLET ORAL at 03:04

## 2024-04-04 RX ADMIN — ACETAMINOPHEN 650 MG: 650 SOLUTION ORAL at 12:04

## 2024-04-04 RX ADMIN — SENNOSIDES 8.6 MG: 8.6 TABLET, FILM COATED ORAL at 08:04

## 2024-04-04 RX ADMIN — ACETAMINOPHEN 650 MG: 650 SOLUTION ORAL at 11:04

## 2024-04-04 RX ADMIN — CEFEPIME 2 G: 2 INJECTION, POWDER, FOR SOLUTION INTRAVENOUS at 10:04

## 2024-04-04 RX ADMIN — CEFEPIME 2 G: 2 INJECTION, POWDER, FOR SOLUTION INTRAVENOUS at 05:04

## 2024-04-04 RX ADMIN — IPRATROPIUM BROMIDE AND ALBUTEROL SULFATE 3 ML: 2.5; .5 SOLUTION RESPIRATORY (INHALATION) at 12:04

## 2024-04-04 RX ADMIN — METHOCARBAMOL 500 MG: 500 TABLET ORAL at 03:04

## 2024-04-04 RX ADMIN — ACETAMINOPHEN 650 MG: 650 SOLUTION ORAL at 05:04

## 2024-04-04 RX ADMIN — GUAIFENESIN 200 MG: 200 SOLUTION ORAL at 11:04

## 2024-04-04 RX ADMIN — BACITRACIN: 500 OINTMENT TOPICAL at 03:04

## 2024-04-04 RX ADMIN — OXYCODONE HYDROCHLORIDE 5 MG: 5 TABLET ORAL at 10:04

## 2024-04-04 RX ADMIN — GUAIFENESIN 200 MG: 200 SOLUTION ORAL at 05:04

## 2024-04-04 RX ADMIN — LORAZEPAM 1 MG: 1 TABLET ORAL at 04:04

## 2024-04-04 RX ADMIN — PROPRANOLOL HYDROCHLORIDE 20 MG: 20 TABLET ORAL at 08:04

## 2024-04-04 RX ADMIN — Medication 2 TABLET: at 08:04

## 2024-04-04 RX ADMIN — QUETIAPINE FUMARATE 12.5 MG: 25 TABLET ORAL at 08:04

## 2024-04-04 RX ADMIN — ENOXAPARIN SODIUM 40 MG: 40 INJECTION SUBCUTANEOUS at 08:04

## 2024-04-04 RX ADMIN — IPRATROPIUM BROMIDE AND ALBUTEROL SULFATE 3 ML: 2.5; .5 SOLUTION RESPIRATORY (INHALATION) at 01:04

## 2024-04-04 RX ADMIN — CEFEPIME 2 G: 2 INJECTION, POWDER, FOR SOLUTION INTRAVENOUS at 02:04

## 2024-04-04 RX ADMIN — Medication 6 MG: at 08:04

## 2024-04-04 RX ADMIN — LIDOCAINE 5% 2 PATCH: 700 PATCH TOPICAL at 11:04

## 2024-04-04 RX ADMIN — THERA TABS 1 TABLET: TAB at 08:04

## 2024-04-04 NOTE — PT/OT/SLP PROGRESS
Occupational Therapy   Treatment    Name: nAayeli Taylor  MRN: [id number]  Admitting Diagnosis:  Traumatic subdural hematoma (SDH)  8 Days Post-Op    Recommendations:     Recommended therapy intensity at discharge: Moderate Intensity Therapy   Discharge Equipment Recommendations:   (TBD)  Barriers to discharge:   (ongoing medical needs; severity of deficits)    Assessment:     Anayeli Taylor is a 73 y.o. male with a medical diagnosis of SDH s/p crani; acute respiratory failure requiring intubation now extubated, R pneumothorax, rib fxs, R medial clavicle and acromion fx (non-op), right adrenal hematoma. Performance deficits affecting function are weakness, impaired endurance, impaired self care skills, impaired functional mobility, gait instability, impaired balance, impaired cognition, decreased coordination, decreased upper extremity function, decreased lower extremity function, decreased safety awareness, orthopedic precautions. Pt has made limited progress towards OT goals at this time; therefore OT decreasing POC to 3x/wk and will adjust as necessary.    Rehab Prognosis:  Fair; patient would benefit from acute skilled OT services to address these deficits and reach maximum level of function.       Plan:     Patient to be seen 3 x/week to address the above listed problems via self-care/home management, therapeutic activities, therapeutic exercises  Plan of Care Expires: 04/30/24  Plan of Care Reviewed with: patient    Subjective     Pain/Comfort:  Pain Rating 1:  (endorses pain in B knee with flexion)  Pain Addressed 1: Reposition, Distraction, Cessation of Activity, Nurse notified    Objective:     Communicated with: NSG prior to session.  Patient found HOB elevated with NG tube, oxygen, blood pressure cuff, palmer catheter, peripheral IV upon OT entry to room.    General Precautions: Standard, fall (SBP < 160)    Orthopedic Precautions:RUE non weight bearing (pendulum ROM ok, full ROM distally)  Braces: UE  Sling  Respiratory Status: Nasal cannula, flow 2 L/min  Vital Signs: WNL throughout     Occupational Performance:     Bed Mobility:    Patient completed Supine to Sit with total assistance and 2 persons   Sitting EOB with total A; SBA ~30 sec with increased cues and tactile input in bilateral knees to promote flexion    Functional Mobility/Transfers:  Patient completed Sit <> Stand Transfer with total assistance  with  standing frame ; Pt with severe kyphotic posture, limited cervical extension. Pt unable to not WB through RUE using standing frame therefore activity terminated to adhere to all precautions  Patient completed Sit <> Stand Transfer with total assistance x2  with  hand held assistance; blocking bilateral knees     Activities of Daily Living:  Grooming: total assistance for trunk control while seated EOB; set up assist for excursion to face using RUE; verbal cues required for thoroughness    Therapeutic Positioning    OT interventions performed during the course of today's session in an effort to prevent and/or reduce acquired pressure injuries:   Education was provided on benefits of and recommendations for therapeutic positioning  Therapeutic positioning was provided at the conclusion of session to offload all bony prominences for the prevention and/or reduction of pressure injuries  Positioning recommendations were communicated to care team   Specialty bed      Patient Education:  Patient provided with verbal education education regarding OT role/goals/POC, fall prevention, safety awareness, Discharge/DME recommendations, pressure ulcer prevention, home exercise program , and RUE weightbearing precaution .  Understanding was verbalized, however additional teaching warranted.      Patient left with bed in chair position with all lines intact, call button in reach, pressure relief boots, and NSG notified.    GOALS:   Multidisciplinary Problems       Occupational Therapy Goals          Problem:  Occupational Therapy    Goal Priority Disciplines Outcome Interventions   Occupational Therapy Goal     OT, PT/OT Ongoing, Progressing    Description: LTG: Pt will perform basic ADLs and ADL t/fs with min A using LRAD by d/c.    STG: to be met in two weeks  1. Pt will follow >80% of simple one step commands  2. Pt will perform grooming EOB with min A.   3. Pt will perform sit<>stand with mod A in prep for ADL t/fs.  4. Pt will perform functional mobility to/from toilet with min A using LRAD.                         Time Tracking:     OT Date of Treatment:    OT Start Time: 1045  OT Stop Time: 1117  OT Total Time (min): 32 min    Billable Minutes:Self Care/Home Management 2    OT/LAUREEN: OT     Number of LAUREEN visits since last OT visit: 4 4/4/2024

## 2024-04-04 NOTE — PT/OT/SLP PROGRESS
[facility]  Speech Language Pathology Department  Dysphagia Therapy Progress Note    Patient Name:  Anayeli Taylor   MRN:  [id number]  Admitting Diagnosis: SDH    Recommendations     General recommendations:  dysphagia therapy  Diet texture/consistency recommendations:  NPO  Medications: via NG tube    Discharge therapy intensity: Moderate Intensity Therapy   Barriers to safe discharge:  acuity of illness, severity of impairment, and level of skilled assistance needed    Subjective     Patient awake, alert, and cooperative.  Spiritual/Cultural/Sikh Beliefs/Practices that affect care: no    Pain/Comfort: Pain Rating 1: 0/10  Respiratory Status: high flow nasal cannula    Objective     Therapeutic Activities:  Pt tolerated thermal stimulation to the anterior faucial pillars x10 with 50% swallow responses.  Laryngeal excursion fair.  Delay 5-6 seconds.    Therapeutic PO Trials:  Consistency Amount Fed By Oral Symptoms Pharyngeal Symptoms   Ice chips 4 tsp SLP None Throat clear after swallow  No phonation after swallow x1   Puree 4 tsp SLP None No phonation after swallow x 1     Assessment     Pt continues to present with oropharyngeal dysphagia and remains unsafe for PO diet.    Goals     Multidisciplinary Problems       SLP Goals          Problem: SLP    Goal Priority Disciplines Outcome   SLP Goal     SLP Ongoing, Progressing   Description: LTG: Pt will tolerate least restrictive PO diet with no clinical signs/sx    ST. Pt will tolerate ice chips with no signs/sx of aspiration - continue  2. Pt will tolerate puree solids with no signs/sx of aspiration - continue  3. Tolerate thermal stimulation to the anterior faucial pillars x10 with 100% effortful swallow responses and delay less than 2 seconds.                      Patient Education     Patient provided with verbal education regarding SLP POC.  Understanding was verbalized, however additional teaching warranted.    Plan     Will  continue to follow and tx as appropriate.    SLP Follow-Up:  Yes   Patient to be seen:  daily   Plan of Care expires:  04/17/24  Plan of Care reviewed with:  patient       Time Tracking     SLP Treatment Date:   04/04/24  Speech Start Time:  1310  Speech Stop Time:  1325     Speech Total Time (min):  15 min    Billable minutes:  Treatment of Swallow Dysfunction, 15 minutes       04/04/2024

## 2024-04-04 NOTE — PROGRESS NOTES
[facility] - [address] [place] [address]  General Surgery  Progress Note    Subjective:     Interval History:     Fever 100.3 today. Patient has productive cough. Xray showing worsening right sided pleural effusion.       Post-Op Info:  Procedure(s) (LRB):  CRANIOTOMY, FOR SUBDURAL HEMATOMA EVACUATION-RIGHT (Right)  Insertion,central venous access device (Right)   8 Days Post-Op      Medications:  Continuous Infusions:  Scheduled Meds:   acetaminophen  650 mg Per NG tube Q6H    albuterol-ipratropium  3 mL Nebulization Q6H    bacitracin   Topical (Top) TID    calcium-vitamin D3  2 tablet Oral Daily    ceFEPime IV (PEDS and ADULTS)  2 g Intravenous Q8H    docusate  100 mg Per NG tube BID    doxazosin  1 mg Per NG tube Daily    enoxparin  40 mg Subcutaneous Q12H (prophylaxis, 0900/2100)    guaiFENesin 100 mg/5 ml  200 mg Per NG tube Q6H    LIDOcaine  2 patch Transdermal Q24H    methocarbamoL  500 mg Per NG tube TID    multivitamin  1 tablet Per NG tube Daily    polyethylene glycol  17 g Per NG tube BID    propranoloL  20 mg Per NG tube TID    QUEtiapine  12.5 mg Oral QHS    senna  8.6 mg Per NG tube Daily     PRN Meds:bisacodyL, hydrALAZINE, labetaloL, LORazepam, melatonin, ondansetron, oxyCODONE     Objective:     Vital Signs (Most Recent):  Temp: 99.5 °F (37.5 °C) (04/04/24 0400)  Pulse: 95 (04/04/24 0600)  Resp: (!) 28 (04/04/24 0438)  BP: 126/60 (04/04/24 0400)  SpO2: 99 % (04/04/24 0600) Vital Signs (24h Range):  Temp:  [99.5 °F (37.5 °C)-101.5 °F (38.6 °C)] 99.5 °F (37.5 °C)  Pulse:  [] 95  Resp:  [20-38] 28  SpO2:  [93 %-99 %] 99 %  BP: (109-140)/(55-86) 126/60       Intake/Output Summary (Last 24 hours) at 4/4/2024 0658  Last data filed at 4/4/2024 0631  Gross per 24 hour   Intake 2032.51 ml   Output 2785 ml   Net -752.49 ml         Physical Exam  Constitutional:       Appearance: He is ill-appearing.      Comments: Sitting in bed, awake, able to follow commands   HENT:      Head:      Comments: Scalp  incisions - clean and dry  Contusions right face/ right periorbital edema/ecchymosis       Nose:      Comments: NGT  Cardiovascular:      Rate and Rhythm: Normal rate and regular rhythm.   Pulmonary:      Breath sounds: Rhonchi present.      Comments: Right clavicle deformity (present on admission secondary to right clavicle fracture)  Productive cough  Chest:      Chest wall: Tenderness present.   Abdominal:      Palpations: Abdomen is soft.   Musculoskeletal:         General: Swelling present.      Comments: Bilateral upper extremity 3+ pitting edema   Skin:     General: Skin is warm and dry.      Findings: Erythema present.      Comments: Erythema LUE    Neurological:      Comments: Awakens to voice, follows commands          Significant Labs:  All pertinent labs from the last 24 hours have been reviewed.    Significant Diagnostics:  I have reviewed all pertinent imaging results/findings within the past 24 hours.    Assessment/Plan:     Active Diagnoses:    Diagnosis Date Noted POA    PRINCIPAL PROBLEM:  Traumatic subdural hematoma (SDH) [S06.5XAA] 03/27/2024 Yes    Hemopneumothorax on right [J94.2] 04/02/2024 Yes    Contusion of right lung [S27.321A] 04/02/2024 Yes    Adrenal hemorrhage [E27.49] 04/02/2024 Yes    Scalp laceration [S01.01XA] 04/02/2024 Yes    Elbow laceration, left, initial encounter [S51.012A] 04/02/2024 Yes    Acute blood loss anemia [D62] 03/29/2024 Yes    Acute hypoxemic respiratory failure [J96.01] 03/27/2024 No    Aspiration pneumonia due to gastric secretions [J69.0] 03/27/2024 No    Closed fracture of multiple ribs [S22.49XA] 03/27/2024 Yes    Closed nondisplaced fracture of sternal end of right clavicle [S42.017A] 03/27/2024 Yes      Problems Resolved During this Admission:     Neuro  TBI/worsening subdural hematoma with mass effect and midline shift s/p emergent craniotomy 3/27/2024 - Continue serial neuro checks, HOB >30 degrees, Keppra completed   Promote sleep-wake cycles, out of  bed, lights on in the morning  Multimodal pain control     Respiratory  Acute respiratory failure secondary to trauma - extubated 03/29/2024  CXR with Rt pleural effusion   Bipap qHS   Febrile today  Empiric cefepime started    consider thoracentesis       Cardiovascular   Troponin trended to normal   Echocardiogram reviewed   Monitor BP/HR/tele     GI  Tube feeds, bowel regimen.    SLP following, remains NPO following MBSS      Renal   Daily labs   continue to monitor I/Os  Continue palmer    Heme  Transfuse if hemoglobin less than 7 or patient with signs of active bleeding     Prophylaxis  DVT prophylaxis - SCDs, Lovenox       ELIAZAR Herrera-BC  General Surgery  [facility] - [address] [place] [address] ICU

## 2024-04-04 NOTE — PT/OT/SLP PROGRESS
Physical Therapy Treatment    Patient Name:  Anayeli Taylor   MRN:  [id number]    Recommendations:     Discharge therapy intensity: Moderate Intensity Therapy   Discharge Equipment Recommendations: to be determined by next level of care  Barriers to discharge: Decreased caregiver support, Impaired mobility, and Ongoing medical needs    Assessment:     Anayeli Taylor is a 73 y.o. male admitted with a medical diagnosis of SDH s/p crani; acute respiratory failure requiring intubation now extubated, R pneumothorax, rib fxs, R medial clavicle and acromion fx (non-op), right adrenal hematoma.  He presents with the following impairments/functional limitations: weakness, decreased upper extremity function, impaired endurance, impaired balance, decreased lower extremity function, impaired self care skills, impaired functional mobility, impaired cognition, decreased safety awareness.    Patient presents with kyphotic posture, limiting patient's ability to progress with standing frame. Patient able to maintain SPO2 WNL during session on NC2L.    Rehab Prognosis: Good; patient would benefit from acute skilled PT services to address these deficits and reach maximum level of function.    Recent Surgery: Procedure(s) (LRB):  CRANIOTOMY, FOR SUBDURAL HEMATOMA EVACUATION-RIGHT (Right)  Insertion,central venous access device (Right) 8 Days Post-Op    Plan:     During this hospitalization, patient to be seen 3 x/week to address the identified rehab impairments via therapeutic activities, therapeutic exercises, neuromuscular re-education, gait training and progress toward the following goals:    Plan of Care Expires:  04/30/24    Subjective     Chief Complaint: complaints of bilateral knee pain  Patient/Family Comments/goals: none stated  Pain/Comfort:  Pain Rating 1: other (see comments) (B knee pain during flexion)  Location - Side 1: Bilateral  Location - Orientation 1: generalized  Location 1: knee  Pain Addressed 1: Reposition,  Distraction, Nurse notified      Objective:     Communicated with nurse prior to session.  Patient found HOB elevated with telemetry, pulse ox (continuous), peripheral IV, oxygen, palmer catheter, blood pressure cuff upon PT entry to room.     General Precautions: Standard, fall, aspiration  Orthopedic Precautions: RUE non weight bearing  Braces: UE Sling  Respiratory Status: Nasal cannula, flow 2 L/min  Blood Pressure: 126/69mmHg, HR: 89bpm, SPO2: 92%  Skin Integrity: Visible skin intact      Functional Mobility:  Bed Mobility:     Scooting: total assistance  Supine to Sit: total assistance and of 2 persons  Sit to Supine: total assistance and of 2 persons  Transfers:     Sit to Stand:  total assistance and of 2 persons with hand-held assist x 2 trials  Total assistance with standing frame. Patient presents with kyphotic posture limiting patient ability to perform upright standing. Patient unable to maintain RUE NWB pxns with standing frame due to kyphotic posture and activity terminated.     Therapeutic Activities/Exercises:  Patient performed static and dynamic sitting edge of bed requiring maxA and progressing to Reza toward end of session. Patient presents with posterior lean and kyphotic posture while seated and required verbal cues to maintain RUE WB pxns.    Education:  Patient provided with verbal education education regarding PT role/goals/POC, post-op precautions, fall prevention, safety awareness, and pressure ulcer prevention.  Understanding was verbalized, however additional teaching warranted.     Patient left with bed in chair position with all lines intact, call button in reach, pressure relief boots, and nurse notified    GOALS:   Multidisciplinary Problems       Physical Therapy Goals          Problem: Physical Therapy    Goal Priority Disciplines Outcome Goal Variances Interventions   Physical Therapy Goal     PT, PT/OT Ongoing, Progressing     Description: Goals to be met by: 4/30/24     Patient  will increase functional independence with mobility by performin. Supine to sit with MInimal Assistance  2. Sit to supine with MInimal Assistance  3. Sit to stand transfer with Minimal Assistance  4. Bed to chair transfer with Minimal Assistance using LRAD  5. Gait TBD  6. Sitting at edge of bed x20 minutes with Minimal Assistance  7. Pt to follow 100% of commands                         Time Tracking:     PT Received On: 24  PT Start Time: 1045     PT Stop Time: 1117  PT Total Time (min): 32 min     Billable Minutes: Therapeutic Activity 32    Treatment Type: Treatment  PT/PTA: PTA     Number of PTA visits since last PT visit: 2024

## 2024-04-05 LAB
ALBUMIN SERPL-MCNC: 2 G/DL (ref 3.4–4.8)
ALBUMIN/GLOB SERPL: 0.5 RATIO (ref 1.1–2)
ALLENS TEST BLOOD GAS (OHS): ABNORMAL
ALP SERPL-CCNC: 131 UNIT/L (ref 40–150)
ALT SERPL-CCNC: 60 UNIT/L (ref 0–55)
AST SERPL-CCNC: 52 UNIT/L (ref 5–34)
BASE EXCESS BLD CALC-SCNC: 8.3 MMOL/L (ref -2–2)
BASOPHILS # BLD AUTO: 0.02 X10(3)/MCL
BASOPHILS NFR BLD AUTO: 0.2 %
BILIRUB SERPL-MCNC: 0.6 MG/DL
BLOOD GAS SAMPLE TYPE (OHS): ABNORMAL
BUN SERPL-MCNC: 25.2 MG/DL (ref 8.4–25.7)
CA-I BLD-SCNC: 1.14 MMOL/L (ref 1.12–1.23)
CALCIUM SERPL-MCNC: 8.2 MG/DL (ref 8.8–10)
CHLORIDE SERPL-SCNC: 108 MMOL/L (ref 98–107)
CO2 BLDA-SCNC: 33 MMOL/L
CO2 SERPL-SCNC: 27 MMOL/L (ref 23–31)
COHGB MFR BLDA: 2.6 % (ref 0.5–1.5)
CREAT SERPL-MCNC: 0.71 MG/DL (ref 0.73–1.18)
DRAWN BY BLOOD GAS (OHS): ABNORMAL
EOSINOPHIL # BLD AUTO: 0.27 X10(3)/MCL (ref 0–0.9)
EOSINOPHIL NFR BLD AUTO: 2.3 %
ERYTHROCYTE [DISTWIDTH] IN BLOOD BY AUTOMATED COUNT: 15.6 % (ref 11.5–17)
GFR SERPLBLD CREATININE-BSD FMLA CKD-EPI: >60 MLS/MIN/1.73/M2
GLOBULIN SER-MCNC: 3.8 GM/DL (ref 2.4–3.5)
GLUCOSE SERPL-MCNC: 196 MG/DL (ref 82–115)
HCO3 BLDA-SCNC: 31.8 MMOL/L (ref 22–26)
HCT VFR BLD AUTO: 24.4 % (ref 42–52)
HGB BLD-MCNC: 7.6 G/DL (ref 14–18)
IMM GRANULOCYTES # BLD AUTO: 0.1 X10(3)/MCL (ref 0–0.04)
IMM GRANULOCYTES NFR BLD AUTO: 0.8 %
INHALED O2 CONCENTRATION: 30 %
LPM (OHS): 20
LYMPHOCYTES # BLD AUTO: 1.67 X10(3)/MCL (ref 0.6–4.6)
LYMPHOCYTES NFR BLD AUTO: 14 %
MAGNESIUM SERPL-MCNC: 2.4 MG/DL (ref 1.6–2.6)
MCH RBC QN AUTO: 30.3 PG (ref 27–31)
MCHC RBC AUTO-ENTMCNC: 31.1 G/DL (ref 33–36)
MCV RBC AUTO: 97.2 FL (ref 80–94)
METHGB MFR BLDA: 0.7 % (ref 0.4–1.5)
MONOCYTES # BLD AUTO: 0.79 X10(3)/MCL (ref 0.1–1.3)
MONOCYTES NFR BLD AUTO: 6.6 %
NEUTROPHILS # BLD AUTO: 9.11 X10(3)/MCL (ref 2.1–9.2)
NEUTROPHILS NFR BLD AUTO: 76.1 %
NRBC BLD AUTO-RTO: 0.2 %
O2 HB BLOOD GAS (OHS): 96.1 % (ref 94–97)
OXYGEN DEVICE BLOOD GAS (OHS): ABNORMAL
OXYHGB MFR BLDA: 7.8 G/DL (ref 12–16)
PCO2 BLDA: 39 MMHG (ref 35–45)
PH BLDA: 7.52 [PH] (ref 7.35–7.45)
PHOSPHATE SERPL-MCNC: 2.1 MG/DL (ref 2.3–4.7)
PLATELET # BLD AUTO: 352 X10(3)/MCL (ref 130–400)
PMV BLD AUTO: 10.2 FL (ref 7.4–10.4)
PO2 BLDA: 86 MMHG (ref 80–100)
POTASSIUM BLOOD GAS (OHS): 3.7 MMOL/L (ref 3.5–5)
POTASSIUM SERPL-SCNC: 3.7 MMOL/L (ref 3.5–5.1)
PROT SERPL-MCNC: 5.8 GM/DL (ref 5.8–7.6)
RBC # BLD AUTO: 2.51 X10(6)/MCL (ref 4.7–6.1)
SAMPLE SITE BLOOD GAS (OHS): ABNORMAL
SAO2 % BLDA: 97.5 %
SODIUM BLOOD GAS (OHS): 139 MMOL/L (ref 137–145)
SODIUM SERPL-SCNC: 141 MMOL/L (ref 136–145)
WBC # SPEC AUTO: 11.96 X10(3)/MCL (ref 4.5–11.5)

## 2024-04-05 PROCEDURE — 25000003 PHARM REV CODE 250: Performed by: SURGERY

## 2024-04-05 PROCEDURE — 25000003 PHARM REV CODE 250: Performed by: NURSE PRACTITIONER

## 2024-04-05 PROCEDURE — 85025 COMPLETE CBC W/AUTO DIFF WBC: CPT | Performed by: NURSE PRACTITIONER

## 2024-04-05 PROCEDURE — 94799 UNLISTED PULMONARY SVC/PX: CPT | Mod: XB

## 2024-04-05 PROCEDURE — 63600175 PHARM REV CODE 636 W HCPCS: Performed by: NURSE PRACTITIONER

## 2024-04-05 PROCEDURE — 82803 BLOOD GASES ANY COMBINATION: CPT

## 2024-04-05 PROCEDURE — 99223 1ST HOSP IP/OBS HIGH 75: CPT | Mod: 25,,, | Performed by: SURGERY

## 2024-04-05 PROCEDURE — 83735 ASSAY OF MAGNESIUM: CPT | Performed by: NURSE PRACTITIONER

## 2024-04-05 PROCEDURE — 99900035 HC TECH TIME PER 15 MIN (STAT)

## 2024-04-05 PROCEDURE — 84100 ASSAY OF PHOSPHORUS: CPT | Performed by: NURSE PRACTITIONER

## 2024-04-05 PROCEDURE — 94760 N-INVAS EAR/PLS OXIMETRY 1: CPT | Mod: XB

## 2024-04-05 PROCEDURE — 32551 INSERTION OF CHEST TUBE: CPT | Mod: RT,,, | Performed by: SURGERY

## 2024-04-05 PROCEDURE — 36600 WITHDRAWAL OF ARTERIAL BLOOD: CPT

## 2024-04-05 PROCEDURE — 25000003 PHARM REV CODE 250

## 2024-04-05 PROCEDURE — 94664 DEMO&/EVAL PT USE INHALER: CPT

## 2024-04-05 PROCEDURE — 32551 INSERTION OF CHEST TUBE: CPT

## 2024-04-05 PROCEDURE — 80053 COMPREHEN METABOLIC PANEL: CPT | Performed by: NURSE PRACTITIONER

## 2024-04-05 PROCEDURE — 11000001 HC ACUTE MED/SURG PRIVATE ROOM

## 2024-04-05 PROCEDURE — 99900031 HC PATIENT EDUCATION (STAT)

## 2024-04-05 PROCEDURE — 25000242 PHARM REV CODE 250 ALT 637 W/ HCPCS: Performed by: NURSE PRACTITIONER

## 2024-04-05 PROCEDURE — 94640 AIRWAY INHALATION TREATMENT: CPT

## 2024-04-05 PROCEDURE — 94799 UNLISTED PULMONARY SVC/PX: CPT

## 2024-04-05 PROCEDURE — 25500020 PHARM REV CODE 255: Performed by: SURGERY

## 2024-04-05 PROCEDURE — 27000249 HC VAPOTHERM CIRCUIT

## 2024-04-05 PROCEDURE — 27000646 HC AEROBIKA DEVICE

## 2024-04-05 PROCEDURE — 27100171 HC OXYGEN HIGH FLOW UP TO 24 HOURS

## 2024-04-05 PROCEDURE — 94761 N-INVAS EAR/PLS OXIMETRY MLT: CPT | Mod: XB

## 2024-04-05 PROCEDURE — 0W9930Z DRAINAGE OF RIGHT PLEURAL CAVITY WITH DRAINAGE DEVICE, PERCUTANEOUS APPROACH: ICD-10-PCS | Performed by: SURGERY

## 2024-04-05 RX ADMIN — Medication 6 MG: at 08:04

## 2024-04-05 RX ADMIN — THERA TABS 1 TABLET: TAB at 08:04

## 2024-04-05 RX ADMIN — ACETAMINOPHEN 650 MG: 650 SOLUTION ORAL at 11:04

## 2024-04-05 RX ADMIN — IPRATROPIUM BROMIDE AND ALBUTEROL SULFATE 3 ML: 2.5; .5 SOLUTION RESPIRATORY (INHALATION) at 12:04

## 2024-04-05 RX ADMIN — Medication 2 TABLET: at 08:04

## 2024-04-05 RX ADMIN — METHOCARBAMOL 500 MG: 500 TABLET ORAL at 08:04

## 2024-04-05 RX ADMIN — IOHEXOL 100 ML: 350 INJECTION, SOLUTION INTRAVENOUS at 10:04

## 2024-04-05 RX ADMIN — PROPRANOLOL HYDROCHLORIDE 20 MG: 20 TABLET ORAL at 08:04

## 2024-04-05 RX ADMIN — LIDOCAINE 5% 2 PATCH: 700 PATCH TOPICAL at 12:04

## 2024-04-05 RX ADMIN — SENNOSIDES 8.6 MG: 8.6 TABLET, FILM COATED ORAL at 08:04

## 2024-04-05 RX ADMIN — CEFEPIME 2 G: 2 INJECTION, POWDER, FOR SOLUTION INTRAVENOUS at 05:04

## 2024-04-05 RX ADMIN — POLYETHYLENE GLYCOL 3350 17 G: 17 POWDER, FOR SOLUTION ORAL at 08:04

## 2024-04-05 RX ADMIN — GUAIFENESIN 200 MG: 200 SOLUTION ORAL at 11:04

## 2024-04-05 RX ADMIN — ACETAMINOPHEN 650 MG: 650 SOLUTION ORAL at 05:04

## 2024-04-05 RX ADMIN — CEFEPIME 2 G: 2 INJECTION, POWDER, FOR SOLUTION INTRAVENOUS at 02:04

## 2024-04-05 RX ADMIN — BACITRACIN: 500 OINTMENT TOPICAL at 08:04

## 2024-04-05 RX ADMIN — GUAIFENESIN 200 MG: 200 SOLUTION ORAL at 05:04

## 2024-04-05 RX ADMIN — LORAZEPAM 1 MG: 1 TABLET ORAL at 02:04

## 2024-04-05 RX ADMIN — GUAIFENESIN 200 MG: 200 SOLUTION ORAL at 12:04

## 2024-04-05 RX ADMIN — BACITRACIN: 500 OINTMENT TOPICAL at 02:04

## 2024-04-05 RX ADMIN — IPRATROPIUM BROMIDE AND ALBUTEROL SULFATE 3 ML: 2.5; .5 SOLUTION RESPIRATORY (INHALATION) at 08:04

## 2024-04-05 RX ADMIN — PROPRANOLOL HYDROCHLORIDE 20 MG: 20 TABLET ORAL at 02:04

## 2024-04-05 RX ADMIN — ENOXAPARIN SODIUM 40 MG: 40 INJECTION SUBCUTANEOUS at 08:04

## 2024-04-05 RX ADMIN — METHOCARBAMOL 500 MG: 500 TABLET ORAL at 02:04

## 2024-04-05 RX ADMIN — CEFEPIME 2 G: 2 INJECTION, POWDER, FOR SOLUTION INTRAVENOUS at 10:04

## 2024-04-05 RX ADMIN — DOXAZOSIN 1 MG: 1 TABLET ORAL at 08:04

## 2024-04-05 RX ADMIN — OXYCODONE HYDROCHLORIDE 5 MG: 5 TABLET ORAL at 05:04

## 2024-04-05 RX ADMIN — DOCUSATE SODIUM LIQUID 100 MG: 100 LIQUID ORAL at 08:04

## 2024-04-05 RX ADMIN — IPRATROPIUM BROMIDE AND ALBUTEROL SULFATE 3 ML: 2.5; .5 SOLUTION RESPIRATORY (INHALATION) at 01:04

## 2024-04-05 RX ADMIN — ACETAMINOPHEN 650 MG: 650 SOLUTION ORAL at 12:04

## 2024-04-05 RX ADMIN — POTASSIUM PHOSPHATE, MONOBASIC AND POTASSIUM PHOSPHATE, DIBASIC 15 MMOL: 224; 236 INJECTION, SOLUTION, CONCENTRATE INTRAVENOUS at 10:04

## 2024-04-05 NOTE — PROGRESS NOTES
04/05/24 1515   Missed Time Reason   SLP Attempted Eval Date 04/05/24   SLP Attempted Eval Time 1500   Missed Treatment Reason Therapist assessment  (confusion, poor secretion management, increased work of breathing)

## 2024-04-05 NOTE — PLAN OF CARE
Updates sent to [facility] in [place] with communication to continue their assessment, pt is not ready quite yet and I will follow up on Monday to see how he is doing and if our Dr have a date in mind when he will be ready.   Temp 100.7 high flow O2  Pt will be getting chest tube  Follow up Monday
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
## 2024-04-05 NOTE — PROGRESS NOTES
[facility] [address]  General Surgery  Progress Note    Subjective:     Interval History:     Temp 100 today. Patient has productive cough, received vapotherm over night. Xray showing slightly improving right sided effusion but new left lobar consolidation.      Post-Op Info:  Procedure(s) (LRB):  CRANIOTOMY, FOR SUBDURAL HEMATOMA EVACUATION-RIGHT (Right)  Insertion,central venous access device (Right)   9 Days Post-Op      Medications:  Continuous Infusions:  Scheduled Meds:   acetaminophen  650 mg Per NG tube Q6H    albuterol-ipratropium  3 mL Nebulization Q6H    bacitracin   Topical (Top) TID    calcium-vitamin D3  2 tablet Oral Daily    ceFEPime IV (PEDS and ADULTS)  2 g Intravenous Q8H    docusate  100 mg Per NG tube BID    doxazosin  1 mg Per NG tube Daily    enoxparin  40 mg Subcutaneous Q12H (prophylaxis, 0900/2100)    guaiFENesin 100 mg/5 ml  200 mg Per NG tube Q6H    LIDOcaine  2 patch Transdermal Q24H    methocarbamoL  500 mg Per NG tube TID    multivitamin  1 tablet Per NG tube Daily    polyethylene glycol  17 g Per NG tube BID    potassium phosphate IVPB  15 mmol Intravenous Once    propranoloL  20 mg Per NG tube TID    senna  8.6 mg Per NG tube Daily     PRN Meds:bisacodyL, hydrALAZINE, labetaloL, LORazepam, melatonin, ondansetron, oxyCODONE     Objective:     Vital Signs (Most Recent):  Temp: 99.1 °F (37.3 °C) (04/05/24 0800)  Pulse: 81 (04/05/24 0828)  Resp: 20 (04/05/24 0828)  BP: 126/65 (04/05/24 0800)  SpO2: 100 % (04/05/24 0828) Vital Signs (24h Range):  Temp:  [98.3 °F (36.8 °C)-100.7 °F (38.2 °C)] 99.1 °F (37.3 °C)  Pulse:  [] 81  Resp:  [20-26] 20  SpO2:  [94 %-100 %] 100 %  BP: (118-151)/(56-75) 126/65       Intake/Output Summary (Last 24 hours) at 4/5/2024 1038  Last data filed at 4/5/2024 1008  Gross per 24 hour   Intake 2122.48 ml   Output 1000 ml   Net 1122.48 ml         Physical Exam  Constitutional:       Appearance: He is ill-appearing.      Comments:  Sitting in bed, awake, able to follow commands   HENT:      Head:      Comments: Scalp incisions - clean and dry  Contusions right face/ right periorbital edema/ecchymosis       Nose:      Comments: NGT  Cardiovascular:      Rate and Rhythm: Normal rate and regular rhythm.   Pulmonary:      Comments: Right clavicle deformity (present on admission secondary to right clavicle fracture)  Productive cough  Abdominal:      Palpations: Abdomen is soft.   Musculoskeletal:         General: Swelling present.      Comments: Bilateral upper extremity mild pitting edema, improved from yesterday   Skin:     General: Skin is warm and dry.      Findings: Erythema present.      Comments: Erythema LUE    Neurological:      Comments: Awakens to voice, follows commands          Significant Labs:  All pertinent labs from the last 24 hours have been reviewed.    Significant Diagnostics:  I have reviewed all pertinent imaging results/findings within the past 24 hours.    Assessment/Plan:     Active Diagnoses:    Diagnosis Date Noted POA    PRINCIPAL PROBLEM:  Traumatic subdural hematoma (SDH) [S06.5XAA] 03/27/2024 Yes    Hemopneumothorax on right [J94.2] 04/02/2024 Yes    Contusion of right lung [S27.321A] 04/02/2024 Yes    Adrenal hemorrhage [E27.49] 04/02/2024 Yes    Scalp laceration [S01.01XA] 04/02/2024 Yes    Elbow laceration, left, initial encounter [S51.012A] 04/02/2024 Yes    Acute blood loss anemia [D62] 03/29/2024 Yes    Acute hypoxemic respiratory failure [J96.01] 03/27/2024 No    Aspiration pneumonia due to gastric secretions [J69.0] 03/27/2024 No    Closed fracture of multiple ribs [S22.49XA] 03/27/2024 Yes    Closed nondisplaced fracture of sternal end of right clavicle [S42.017A] 03/27/2024 Yes      Problems Resolved During this Admission:     Neuro  TBI/worsening subdural hematoma with mass effect and midline shift s/p emergent craniotomy 3/27/2024 - Continue serial neuro checks, HOB >30 degrees, Keppra completed    Promote sleep-wake cycles, out of bed, lights on in the morning  Multimodal pain control     Respiratory  Acute respiratory failure secondary to trauma - extubated 03/29/2024  CXR with improving R pleural effusion; new left consolidation   Bipap qHS   Temp 100 today  ABG ordered  Empiric cefepime started    consider thoracentesis       Cardiovascular  Troponin trended to normal   Echocardiogram reviewed   Monitor BP/HR/tele     GI  Tube feeds, bowel regimen.    SLP following, remains NPO following MBSS      Renal  Daily labs   continue to monitor I/Os  Continue palmer    Heme  Transfuse if hemoglobin less than 7 or patient with signs of active bleeding     Prophylaxis  DVT prophylaxis - SCDs, Lovenox       Josue Thao MD  General Surgery  [facility] - [facility] [place] ICU

## 2024-04-06 LAB
ALBUMIN SERPL-MCNC: 2.1 G/DL (ref 3.4–4.8)
ALBUMIN/GLOB SERPL: 0.6 RATIO (ref 1.1–2)
ALP SERPL-CCNC: 158 UNIT/L (ref 40–150)
ALT SERPL-CCNC: 75 UNIT/L (ref 0–55)
AST SERPL-CCNC: 63 UNIT/L (ref 5–34)
BASOPHILS # BLD AUTO: 0.03 X10(3)/MCL
BASOPHILS NFR BLD AUTO: 0.2 %
BILIRUB SERPL-MCNC: 0.6 MG/DL
BUN SERPL-MCNC: 22.6 MG/DL (ref 8.4–25.7)
CALCIUM SERPL-MCNC: 8 MG/DL (ref 8.8–10)
CHLORIDE SERPL-SCNC: 108 MMOL/L (ref 98–107)
CO2 SERPL-SCNC: 29 MMOL/L (ref 23–31)
CREAT SERPL-MCNC: 0.63 MG/DL (ref 0.73–1.18)
EOSINOPHIL # BLD AUTO: 0.19 X10(3)/MCL (ref 0–0.9)
EOSINOPHIL NFR BLD AUTO: 1.5 %
ERYTHROCYTE [DISTWIDTH] IN BLOOD BY AUTOMATED COUNT: 15.6 % (ref 11.5–17)
GFR SERPLBLD CREATININE-BSD FMLA CKD-EPI: >60 MLS/MIN/1.73/M2
GLOBULIN SER-MCNC: 3.4 GM/DL (ref 2.4–3.5)
GLUCOSE SERPL-MCNC: 176 MG/DL (ref 82–115)
HCT VFR BLD AUTO: 25.5 % (ref 42–52)
HGB BLD-MCNC: 8.2 G/DL (ref 14–18)
IMM GRANULOCYTES # BLD AUTO: 0.12 X10(3)/MCL (ref 0–0.04)
IMM GRANULOCYTES NFR BLD AUTO: 1 %
LYMPHOCYTES # BLD AUTO: 1.9 X10(3)/MCL (ref 0.6–4.6)
LYMPHOCYTES NFR BLD AUTO: 15.1 %
MAGNESIUM SERPL-MCNC: 2.4 MG/DL (ref 1.6–2.6)
MCH RBC QN AUTO: 29.9 PG (ref 27–31)
MCHC RBC AUTO-ENTMCNC: 32.2 G/DL (ref 33–36)
MCV RBC AUTO: 93.1 FL (ref 80–94)
MONOCYTES # BLD AUTO: 0.88 X10(3)/MCL (ref 0.1–1.3)
MONOCYTES NFR BLD AUTO: 7 %
NEUTROPHILS # BLD AUTO: 9.5 X10(3)/MCL (ref 2.1–9.2)
NEUTROPHILS NFR BLD AUTO: 75.2 %
NRBC BLD AUTO-RTO: 0.2 %
PHOSPHATE SERPL-MCNC: 2.3 MG/DL (ref 2.3–4.7)
PLATELET # BLD AUTO: 442 X10(3)/MCL (ref 130–400)
PMV BLD AUTO: 10.2 FL (ref 7.4–10.4)
POCT GLUCOSE: 182 MG/DL (ref 70–110)
POTASSIUM SERPL-SCNC: 4.3 MMOL/L (ref 3.5–5.1)
PROT SERPL-MCNC: 5.5 GM/DL (ref 5.8–7.6)
RBC # BLD AUTO: 2.74 X10(6)/MCL (ref 4.7–6.1)
SODIUM SERPL-SCNC: 143 MMOL/L (ref 136–145)
WBC # SPEC AUTO: 12.62 X10(3)/MCL (ref 4.5–11.5)

## 2024-04-06 PROCEDURE — 25000003 PHARM REV CODE 250

## 2024-04-06 PROCEDURE — 25000003 PHARM REV CODE 250: Performed by: NURSE PRACTITIONER

## 2024-04-06 PROCEDURE — 25000242 PHARM REV CODE 250 ALT 637 W/ HCPCS: Performed by: NURSE PRACTITIONER

## 2024-04-06 PROCEDURE — 94799 UNLISTED PULMONARY SVC/PX: CPT

## 2024-04-06 PROCEDURE — 94660 CPAP INITIATION&MGMT: CPT

## 2024-04-06 PROCEDURE — 94760 N-INVAS EAR/PLS OXIMETRY 1: CPT

## 2024-04-06 PROCEDURE — 85025 COMPLETE CBC W/AUTO DIFF WBC: CPT | Performed by: NURSE PRACTITIONER

## 2024-04-06 PROCEDURE — 27000646 HC AEROBIKA DEVICE

## 2024-04-06 PROCEDURE — 99900035 HC TECH TIME PER 15 MIN (STAT)

## 2024-04-06 PROCEDURE — 99900026 HC AIRWAY MAINTENANCE (STAT)

## 2024-04-06 PROCEDURE — 83735 ASSAY OF MAGNESIUM: CPT | Performed by: NURSE PRACTITIONER

## 2024-04-06 PROCEDURE — 27100171 HC OXYGEN HIGH FLOW UP TO 24 HOURS

## 2024-04-06 PROCEDURE — 25000003 PHARM REV CODE 250: Performed by: SURGERY

## 2024-04-06 PROCEDURE — 11000001 HC ACUTE MED/SURG PRIVATE ROOM

## 2024-04-06 PROCEDURE — 84100 ASSAY OF PHOSPHORUS: CPT | Performed by: NURSE PRACTITIONER

## 2024-04-06 PROCEDURE — 63600175 PHARM REV CODE 636 W HCPCS: Performed by: NURSE PRACTITIONER

## 2024-04-06 PROCEDURE — 21400001 HC TELEMETRY ROOM

## 2024-04-06 PROCEDURE — 80053 COMPREHEN METABOLIC PANEL: CPT | Performed by: NURSE PRACTITIONER

## 2024-04-06 PROCEDURE — 99900031 HC PATIENT EDUCATION (STAT)

## 2024-04-06 PROCEDURE — 94640 AIRWAY INHALATION TREATMENT: CPT

## 2024-04-06 PROCEDURE — 99223 1ST HOSP IP/OBS HIGH 75: CPT | Mod: ,,, | Performed by: SURGERY

## 2024-04-06 PROCEDURE — 94664 DEMO&/EVAL PT USE INHALER: CPT

## 2024-04-06 PROCEDURE — 94668 MNPJ CHEST WALL SBSQ: CPT

## 2024-04-06 RX ORDER — INSULIN ASPART 100 [IU]/ML
0-5 INJECTION, SOLUTION INTRAVENOUS; SUBCUTANEOUS EVERY 6 HOURS PRN
Status: DISCONTINUED | OUTPATIENT
Start: 2024-04-06 | End: 2024-04-23

## 2024-04-06 RX ORDER — GLUCAGON 1 MG
1 KIT INJECTION
Status: DISCONTINUED | OUTPATIENT
Start: 2024-04-06 | End: 2024-04-23

## 2024-04-06 RX ORDER — FUROSEMIDE 10 MG/ML
20 INJECTION INTRAMUSCULAR; INTRAVENOUS ONCE
Status: COMPLETED | OUTPATIENT
Start: 2024-04-06 | End: 2024-04-06

## 2024-04-06 RX ORDER — SODIUM CHLORIDE FOR INHALATION 3 %
4 VIAL, NEBULIZER (ML) INHALATION EVERY 6 HOURS
Status: DISCONTINUED | OUTPATIENT
Start: 2024-04-06 | End: 2024-04-15

## 2024-04-06 RX ADMIN — PROPRANOLOL HYDROCHLORIDE 20 MG: 20 TABLET ORAL at 02:04

## 2024-04-06 RX ADMIN — IPRATROPIUM BROMIDE AND ALBUTEROL SULFATE 3 ML: 2.5; .5 SOLUTION RESPIRATORY (INHALATION) at 08:04

## 2024-04-06 RX ADMIN — DOXAZOSIN 1 MG: 1 TABLET ORAL at 09:04

## 2024-04-06 RX ADMIN — POLYETHYLENE GLYCOL 3350 17 G: 17 POWDER, FOR SOLUTION ORAL at 09:04

## 2024-04-06 RX ADMIN — IPRATROPIUM BROMIDE AND ALBUTEROL SULFATE 3 ML: 2.5; .5 SOLUTION RESPIRATORY (INHALATION) at 12:04

## 2024-04-06 RX ADMIN — SODIUM CHLORIDE SOLN NEBU 3% 4 ML: 3 NEBU SOLN at 07:04

## 2024-04-06 RX ADMIN — IPRATROPIUM BROMIDE AND ALBUTEROL SULFATE 3 ML: 2.5; .5 SOLUTION RESPIRATORY (INHALATION) at 01:04

## 2024-04-06 RX ADMIN — GUAIFENESIN 200 MG: 200 SOLUTION ORAL at 05:04

## 2024-04-06 RX ADMIN — METHOCARBAMOL 500 MG: 500 TABLET ORAL at 08:04

## 2024-04-06 RX ADMIN — BACITRACIN: 500 OINTMENT TOPICAL at 03:04

## 2024-04-06 RX ADMIN — CEFEPIME 2 G: 2 INJECTION, POWDER, FOR SOLUTION INTRAVENOUS at 05:04

## 2024-04-06 RX ADMIN — FUROSEMIDE 20 MG: 10 INJECTION, SOLUTION INTRAMUSCULAR; INTRAVENOUS at 09:04

## 2024-04-06 RX ADMIN — IPRATROPIUM BROMIDE AND ALBUTEROL SULFATE 3 ML: 2.5; .5 SOLUTION RESPIRATORY (INHALATION) at 07:04

## 2024-04-06 RX ADMIN — ACETAMINOPHEN 650 MG: 650 SOLUTION ORAL at 05:04

## 2024-04-06 RX ADMIN — OXYCODONE HYDROCHLORIDE 5 MG: 5 TABLET ORAL at 08:04

## 2024-04-06 RX ADMIN — ACETAMINOPHEN 650 MG: 650 SOLUTION ORAL at 12:04

## 2024-04-06 RX ADMIN — BACITRACIN: 500 OINTMENT TOPICAL at 09:04

## 2024-04-06 RX ADMIN — CEFEPIME 2 G: 2 INJECTION, POWDER, FOR SOLUTION INTRAVENOUS at 02:04

## 2024-04-06 RX ADMIN — LIDOCAINE 5% 2 PATCH: 700 PATCH TOPICAL at 12:04

## 2024-04-06 RX ADMIN — CEFEPIME 2 G: 2 INJECTION, POWDER, FOR SOLUTION INTRAVENOUS at 09:04

## 2024-04-06 RX ADMIN — BACITRACIN: 500 OINTMENT TOPICAL at 08:04

## 2024-04-06 RX ADMIN — DOCUSATE SODIUM LIQUID 100 MG: 100 LIQUID ORAL at 08:04

## 2024-04-06 RX ADMIN — OXYCODONE HYDROCHLORIDE 5 MG: 5 TABLET ORAL at 03:04

## 2024-04-06 RX ADMIN — ENOXAPARIN SODIUM 40 MG: 40 INJECTION SUBCUTANEOUS at 08:04

## 2024-04-06 RX ADMIN — PROPRANOLOL HYDROCHLORIDE 20 MG: 20 TABLET ORAL at 09:04

## 2024-04-06 RX ADMIN — PROPRANOLOL HYDROCHLORIDE 20 MG: 20 TABLET ORAL at 08:04

## 2024-04-06 RX ADMIN — ENOXAPARIN SODIUM 40 MG: 40 INJECTION SUBCUTANEOUS at 09:04

## 2024-04-06 RX ADMIN — METHOCARBAMOL 500 MG: 500 TABLET ORAL at 09:04

## 2024-04-06 RX ADMIN — SENNOSIDES 8.6 MG: 8.6 TABLET, FILM COATED ORAL at 09:04

## 2024-04-06 RX ADMIN — POLYETHYLENE GLYCOL 3350 17 G: 17 POWDER, FOR SOLUTION ORAL at 08:04

## 2024-04-06 RX ADMIN — METHOCARBAMOL 500 MG: 500 TABLET ORAL at 02:04

## 2024-04-06 RX ADMIN — THERA TABS 1 TABLET: TAB at 09:04

## 2024-04-06 RX ADMIN — Medication 6 MG: at 08:04

## 2024-04-06 RX ADMIN — Medication 2 TABLET: at 09:04

## 2024-04-06 RX ADMIN — SODIUM CHLORIDE SOLN NEBU 3% 4 ML: 3 NEBU SOLN at 12:04

## 2024-04-06 RX ADMIN — GUAIFENESIN 200 MG: 200 SOLUTION ORAL at 12:04

## 2024-04-06 RX ADMIN — DOCUSATE SODIUM LIQUID 100 MG: 100 LIQUID ORAL at 09:04

## 2024-04-06 NOTE — PROGRESS NOTES
[facility] - [facility]  General Surgery  Progress Note    Subjective:     Interval History:     TMAX 99.9. More alert. Pulls at lines. S/p pleural drain placement yesterday with 800 sanguineous out. +cough with thick secretions and poor clearance       Post-Op Info:  Procedure(s) (LRB):  CRANIOTOMY, FOR SUBDURAL HEMATOMA EVACUATION-RIGHT (Right)  Insertion,central venous access device (Right)   10 Days Post-Op      Medications:  Continuous Infusions:  Scheduled Meds:   acetaminophen  650 mg Per NG tube Q6H    albuterol-ipratropium  3 mL Nebulization Q6H    bacitracin   Topical (Top) TID    calcium-vitamin D3  2 tablet Oral Daily    ceFEPime IV (PEDS and ADULTS)  2 g Intravenous Q8H    docusate  100 mg Per NG tube BID    doxazosin  1 mg Per NG tube Daily    enoxparin  40 mg Subcutaneous Q12H (prophylaxis, 0900/2100)    guaiFENesin 100 mg/5 ml  200 mg Per NG tube Q6H    LIDOcaine  2 patch Transdermal Q24H    methocarbamoL  500 mg Per NG tube TID    multivitamin  1 tablet Per NG tube Daily    polyethylene glycol  17 g Per NG tube BID    propranoloL  20 mg Per NG tube TID    senna  8.6 mg Per NG tube Daily    sodium chloride 3%  4 mL Nebulization Q6H     PRN Meds:bisacodyL, hydrALAZINE, labetaloL, LORazepam, melatonin, ondansetron, oxyCODONE     Objective:     Vital Signs (Most Recent):  Temp: 98.8 °F (37.1 °C) (04/06/24 0800)  Pulse: (!) 133 (04/06/24 0908)  Resp: 20 (04/06/24 0818)  BP: (!) 134/54 (04/06/24 0908)  SpO2: 97 % (04/06/24 0818) Vital Signs (24h Range):  Temp:  [98.8 °F (37.1 °C)-99.9 °F (37.7 °C)] 98.8 °F (37.1 °C)  Pulse:  [] 133  Resp:  [20-28] 20  SpO2:  [92 %-100 %] 97 %  BP: (126-151)/(54-92) 134/54       Intake/Output Summary (Last 24 hours) at 4/6/2024 1139  Last data filed at 4/6/2024 1000  Gross per 24 hour   Intake 1619 ml   Output 2850 ml   Net -1231 ml         Physical Exam  Constitutional:       Appearance: He is ill-appearing.      Comments: Sitting in bed, awake,  able to follow commands   HENT:      Head:      Comments: Scalp incisions - clean and dry  Contusions right face/ right periorbital edema/ecchymosis       Nose:      Comments: NGT  Cardiovascular:      Rate and Rhythm: Normal rate and regular rhythm.   Pulmonary:      Comments: Right clavicle deformity (present on admission secondary to right clavicle fracture)  Productive cough  Abdominal:      Palpations: Abdomen is soft.   Musculoskeletal:         General: No swelling.      Comments: Bilateral upper extremity mild pitting edema, improved from yesterday   Skin:     General: Skin is warm and dry.      Findings: No erythema.      Comments: Erythema LUE    Neurological:      Comments: Awakens to voice, follows commands          Significant Labs:  All pertinent labs from the last 24 hours have been reviewed.    Significant Diagnostics:  I have reviewed all pertinent imaging results/findings within the past 24 hours.    Assessment/Plan:     Active Diagnoses:    Diagnosis Date Noted POA    PRINCIPAL PROBLEM:  Traumatic subdural hematoma (SDH) [S06.5XAA] 03/27/2024 Yes    Hemopneumothorax on right [J94.2] 04/02/2024 Yes    Contusion of right lung [S27.321A] 04/02/2024 Yes    Adrenal hemorrhage [E27.49] 04/02/2024 Yes    Scalp laceration [S01.01XA] 04/02/2024 Yes    Elbow laceration, left, initial encounter [S51.012A] 04/02/2024 Yes    Acute blood loss anemia [D62] 03/29/2024 Yes    Acute hypoxemic respiratory failure [J96.01] 03/27/2024 No    Aspiration pneumonia due to gastric secretions [J69.0] 03/27/2024 No    Closed fracture of multiple ribs [S22.49XA] 03/27/2024 Yes    Closed nondisplaced fracture of sternal end of right clavicle [S42.017A] 03/27/2024 Yes      Problems Resolved During this Admission:     Neuro  TBI/worsening subdural hematoma with mass effect and midline shift s/p emergent craniotomy 3/27/2024 - Continue serial neuro checks, HOB >30 degrees, Keppra completed   Promote sleep-wake cycles, out of bed,  lights on in the morning  Multimodal pain control     Respiratory  Acute respiratory failure secondary to trauma - extubated 03/29/2024  CXR with improving R pleural effusion; new left consolidation   S/p R pleural drain , monitor I/O   Bipap qHS   Empiric cefepime started   Pulmonary toilet with nebs, suction, CPT    Cardiovascular  Troponin trended to normal   Echocardiogram reviewed   Monitor BP/HR/tele     GI  Tube feeds, bowel regimen.    SLP following, remains NPO following MBSS   Add SSI      Renal  Daily labs   continue to monitor I/Os  Lasix x 1     Heme  Transfuse if hemoglobin less than 7 or patient with signs of active bleeding     Prophylaxis  DVT prophylaxis - SCDs, Lovenox       ELIAZAR Herrera-BC  General Surgery  [facility] - [facility] [place] ICU

## 2024-04-07 LAB
ALBUMIN SERPL-MCNC: 2.1 G/DL (ref 3.4–4.8)
ALBUMIN/GLOB SERPL: 0.6 RATIO (ref 1.1–2)
ALP SERPL-CCNC: 176 UNIT/L (ref 40–150)
ALT SERPL-CCNC: 85 UNIT/L (ref 0–55)
AST SERPL-CCNC: 67 UNIT/L (ref 5–34)
BASOPHILS # BLD AUTO: 0.03 X10(3)/MCL
BASOPHILS NFR BLD AUTO: 0.3 %
BILIRUB SERPL-MCNC: 0.5 MG/DL
BUN SERPL-MCNC: 24.8 MG/DL (ref 8.4–25.7)
CALCIUM SERPL-MCNC: 8.1 MG/DL (ref 8.8–10)
CHLORIDE SERPL-SCNC: 106 MMOL/L (ref 98–107)
CO2 SERPL-SCNC: 26 MMOL/L (ref 23–31)
CREAT SERPL-MCNC: 0.61 MG/DL (ref 0.73–1.18)
EOSINOPHIL # BLD AUTO: 0.23 X10(3)/MCL (ref 0–0.9)
EOSINOPHIL NFR BLD AUTO: 2.4 %
ERYTHROCYTE [DISTWIDTH] IN BLOOD BY AUTOMATED COUNT: 15.6 % (ref 11.5–17)
GFR SERPLBLD CREATININE-BSD FMLA CKD-EPI: >60 MLS/MIN/1.73/M2
GLOBULIN SER-MCNC: 3.3 GM/DL (ref 2.4–3.5)
GLUCOSE SERPL-MCNC: 199 MG/DL (ref 82–115)
HCT VFR BLD AUTO: 23.8 % (ref 42–52)
HGB BLD-MCNC: 7.7 G/DL (ref 14–18)
IMM GRANULOCYTES # BLD AUTO: 0.13 X10(3)/MCL (ref 0–0.04)
IMM GRANULOCYTES NFR BLD AUTO: 1.3 %
LYMPHOCYTES # BLD AUTO: 1.51 X10(3)/MCL (ref 0.6–4.6)
LYMPHOCYTES NFR BLD AUTO: 15.6 %
MAGNESIUM SERPL-MCNC: 2.3 MG/DL (ref 1.6–2.6)
MCH RBC QN AUTO: 29.7 PG (ref 27–31)
MCHC RBC AUTO-ENTMCNC: 32.4 G/DL (ref 33–36)
MCV RBC AUTO: 91.9 FL (ref 80–94)
MONOCYTES # BLD AUTO: 0.75 X10(3)/MCL (ref 0.1–1.3)
MONOCYTES NFR BLD AUTO: 7.7 %
NEUTROPHILS # BLD AUTO: 7.03 X10(3)/MCL (ref 2.1–9.2)
NEUTROPHILS NFR BLD AUTO: 72.7 %
NRBC BLD AUTO-RTO: 0.6 %
PHOSPHATE SERPL-MCNC: 2.4 MG/DL (ref 2.3–4.7)
PLATELET # BLD AUTO: 525 X10(3)/MCL (ref 130–400)
PMV BLD AUTO: 9.6 FL (ref 7.4–10.4)
POCT GLUCOSE: 156 MG/DL (ref 70–110)
POCT GLUCOSE: 162 MG/DL (ref 70–110)
POCT GLUCOSE: 164 MG/DL (ref 70–110)
POCT GLUCOSE: 205 MG/DL (ref 70–110)
POTASSIUM SERPL-SCNC: 4.4 MMOL/L (ref 3.5–5.1)
PROT SERPL-MCNC: 5.4 GM/DL (ref 5.8–7.6)
RBC # BLD AUTO: 2.59 X10(6)/MCL (ref 4.7–6.1)
SODIUM SERPL-SCNC: 139 MMOL/L (ref 136–145)
WBC # SPEC AUTO: 9.68 X10(3)/MCL (ref 4.5–11.5)

## 2024-04-07 PROCEDURE — 25000003 PHARM REV CODE 250: Performed by: NURSE PRACTITIONER

## 2024-04-07 PROCEDURE — 94760 N-INVAS EAR/PLS OXIMETRY 1: CPT

## 2024-04-07 PROCEDURE — 80053 COMPREHEN METABOLIC PANEL: CPT | Performed by: NURSE PRACTITIONER

## 2024-04-07 PROCEDURE — 85025 COMPLETE CBC W/AUTO DIFF WBC: CPT | Performed by: NURSE PRACTITIONER

## 2024-04-07 PROCEDURE — 25000242 PHARM REV CODE 250 ALT 637 W/ HCPCS: Performed by: NURSE PRACTITIONER

## 2024-04-07 PROCEDURE — 63600175 PHARM REV CODE 636 W HCPCS: Performed by: NURSE PRACTITIONER

## 2024-04-07 PROCEDURE — 25000003 PHARM REV CODE 250

## 2024-04-07 PROCEDURE — 27000221 HC OXYGEN, UP TO 24 HOURS

## 2024-04-07 PROCEDURE — 99900035 HC TECH TIME PER 15 MIN (STAT)

## 2024-04-07 PROCEDURE — 94640 AIRWAY INHALATION TREATMENT: CPT

## 2024-04-07 PROCEDURE — 84100 ASSAY OF PHOSPHORUS: CPT | Performed by: NURSE PRACTITIONER

## 2024-04-07 PROCEDURE — 21400001 HC TELEMETRY ROOM

## 2024-04-07 PROCEDURE — 99900031 HC PATIENT EDUCATION (STAT)

## 2024-04-07 PROCEDURE — 99223 1ST HOSP IP/OBS HIGH 75: CPT | Mod: ,,, | Performed by: SURGERY

## 2024-04-07 PROCEDURE — 83735 ASSAY OF MAGNESIUM: CPT | Performed by: NURSE PRACTITIONER

## 2024-04-07 PROCEDURE — 25000003 PHARM REV CODE 250: Performed by: SURGERY

## 2024-04-07 PROCEDURE — 11000001 HC ACUTE MED/SURG PRIVATE ROOM

## 2024-04-07 RX ORDER — QUETIAPINE FUMARATE 25 MG/1
25 TABLET, FILM COATED ORAL NIGHTLY
Status: DISCONTINUED | OUTPATIENT
Start: 2024-04-07 | End: 2024-04-15

## 2024-04-07 RX ADMIN — LIDOCAINE 5% 2 PATCH: 700 PATCH TOPICAL at 12:04

## 2024-04-07 RX ADMIN — GUAIFENESIN 200 MG: 200 SOLUTION ORAL at 05:04

## 2024-04-07 RX ADMIN — GUAIFENESIN 200 MG: 200 SOLUTION ORAL at 01:04

## 2024-04-07 RX ADMIN — OXYCODONE HYDROCHLORIDE 5 MG: 5 TABLET ORAL at 05:04

## 2024-04-07 RX ADMIN — METHOCARBAMOL 500 MG: 500 TABLET ORAL at 10:04

## 2024-04-07 RX ADMIN — ACETAMINOPHEN 650 MG: 650 SOLUTION ORAL at 05:04

## 2024-04-07 RX ADMIN — POLYETHYLENE GLYCOL 3350 17 G: 17 POWDER, FOR SOLUTION ORAL at 10:04

## 2024-04-07 RX ADMIN — GUAIFENESIN 200 MG: 200 SOLUTION ORAL at 12:04

## 2024-04-07 RX ADMIN — DOCUSATE SODIUM LIQUID 100 MG: 100 LIQUID ORAL at 10:04

## 2024-04-07 RX ADMIN — ENOXAPARIN SODIUM 40 MG: 40 INJECTION SUBCUTANEOUS at 10:04

## 2024-04-07 RX ADMIN — PROPRANOLOL HYDROCHLORIDE 20 MG: 20 TABLET ORAL at 08:04

## 2024-04-07 RX ADMIN — POLYETHYLENE GLYCOL 3350 17 G: 17 POWDER, FOR SOLUTION ORAL at 08:04

## 2024-04-07 RX ADMIN — BACITRACIN: 500 OINTMENT TOPICAL at 10:04

## 2024-04-07 RX ADMIN — IPRATROPIUM BROMIDE AND ALBUTEROL SULFATE 3 ML: 2.5; .5 SOLUTION RESPIRATORY (INHALATION) at 08:04

## 2024-04-07 RX ADMIN — SODIUM CHLORIDE SOLN NEBU 3% 4 ML: 3 NEBU SOLN at 07:04

## 2024-04-07 RX ADMIN — BACITRACIN: 500 OINTMENT TOPICAL at 03:04

## 2024-04-07 RX ADMIN — ACETAMINOPHEN 650 MG: 650 SOLUTION ORAL at 01:04

## 2024-04-07 RX ADMIN — SENNOSIDES 8.6 MG: 8.6 TABLET, FILM COATED ORAL at 08:04

## 2024-04-07 RX ADMIN — QUETIAPINE FUMARATE 25 MG: 25 TABLET ORAL at 10:04

## 2024-04-07 RX ADMIN — ENOXAPARIN SODIUM 40 MG: 40 INJECTION SUBCUTANEOUS at 08:04

## 2024-04-07 RX ADMIN — IPRATROPIUM BROMIDE AND ALBUTEROL SULFATE 3 ML: 2.5; .5 SOLUTION RESPIRATORY (INHALATION) at 07:04

## 2024-04-07 RX ADMIN — METHOCARBAMOL 500 MG: 500 TABLET ORAL at 08:04

## 2024-04-07 RX ADMIN — CEFEPIME 2 G: 2 INJECTION, POWDER, FOR SOLUTION INTRAVENOUS at 05:04

## 2024-04-07 RX ADMIN — BACITRACIN: 500 OINTMENT TOPICAL at 11:04

## 2024-04-07 RX ADMIN — THERA TABS 1 TABLET: TAB at 08:04

## 2024-04-07 RX ADMIN — METHOCARBAMOL 500 MG: 500 TABLET ORAL at 03:04

## 2024-04-07 RX ADMIN — OXYCODONE HYDROCHLORIDE 5 MG: 5 TABLET ORAL at 12:04

## 2024-04-07 RX ADMIN — DOXAZOSIN 1 MG: 1 TABLET ORAL at 08:04

## 2024-04-07 RX ADMIN — PROPRANOLOL HYDROCHLORIDE 20 MG: 20 TABLET ORAL at 10:04

## 2024-04-07 RX ADMIN — SODIUM CHLORIDE SOLN NEBU 3% 4 ML: 3 NEBU SOLN at 02:04

## 2024-04-07 RX ADMIN — CEFEPIME 2 G: 2 INJECTION, POWDER, FOR SOLUTION INTRAVENOUS at 01:04

## 2024-04-07 RX ADMIN — PROPRANOLOL HYDROCHLORIDE 20 MG: 20 TABLET ORAL at 03:04

## 2024-04-07 RX ADMIN — SODIUM CHLORIDE SOLN NEBU 3% 4 ML: 3 NEBU SOLN at 08:04

## 2024-04-07 RX ADMIN — CEFEPIME 2 G: 2 INJECTION, POWDER, FOR SOLUTION INTRAVENOUS at 11:04

## 2024-04-07 RX ADMIN — OXYCODONE HYDROCHLORIDE 5 MG: 5 TABLET ORAL at 10:04

## 2024-04-07 RX ADMIN — IPRATROPIUM BROMIDE AND ALBUTEROL SULFATE 3 ML: 2.5; .5 SOLUTION RESPIRATORY (INHALATION) at 02:04

## 2024-04-07 RX ADMIN — DOCUSATE SODIUM LIQUID 100 MG: 100 LIQUID ORAL at 08:04

## 2024-04-07 RX ADMIN — Medication 2 TABLET: at 08:04

## 2024-04-07 RX ADMIN — Medication 6 MG: at 10:04

## 2024-04-07 NOTE — PROGRESS NOTES
[facility] - [address] [place] [address] ICU  General Surgery  Progress Note    Subjective:     Interval History:     AFVSS on 2L o2. Alert on entry and conversational but confused. Denies complaint. No perceived output from right CT. Denies pain       Post-Op Info:  Procedure(s) (LRB):  CRANIOTOMY, FOR SUBDURAL HEMATOMA EVACUATION-RIGHT (Right)  Insertion,central venous access device (Right)   11 Days Post-Op      Medications:  Continuous Infusions:  Scheduled Meds:   albuterol-ipratropium  3 mL Nebulization Q6H    bacitracin   Topical (Top) TID    calcium-vitamin D3  2 tablet Oral Daily    ceFEPime IV (PEDS and ADULTS)  2 g Intravenous Q8H    docusate  100 mg Per NG tube BID    doxazosin  1 mg Per NG tube Daily    enoxparin  40 mg Subcutaneous Q12H (prophylaxis, 0900/2100)    guaiFENesin 100 mg/5 ml  200 mg Per NG tube Q6H    LIDOcaine  2 patch Transdermal Q24H    methocarbamoL  500 mg Per NG tube TID    multivitamin  1 tablet Per NG tube Daily    polyethylene glycol  17 g Per NG tube BID    propranoloL  20 mg Per NG tube TID    senna  8.6 mg Per NG tube Daily    sodium chloride 3%  4 mL Nebulization Q6H     PRN Meds:bisacodyL, dextrose 10%, dextrose 10%, glucagon (human recombinant), hydrALAZINE, insulin aspart U-100, labetaloL, LORazepam, melatonin, ondansetron, oxyCODONE     Objective:     Vital Signs (Most Recent):  Temp: 97.5 °F (36.4 °C) (04/07/24 0450)  Pulse: 87 (04/07/24 0450)  Resp: 16 (04/07/24 0539)  BP: 114/64 (04/07/24 0450)  SpO2: 97 % (04/07/24 0450) Vital Signs (24h Range):  Temp:  [97.5 °F (36.4 °C)-98.9 °F (37.2 °C)] 97.5 °F (36.4 °C)  Pulse:  [] 87  Resp:  [16-21] 16  SpO2:  [95 %-100 %] 97 %  BP: (111-143)/(54-87) 114/64       Intake/Output Summary (Last 24 hours) at 4/7/2024 0714  Last data filed at 4/7/2024 0602  Gross per 24 hour   Intake --   Output 2050 ml   Net -2050 ml         Physical Exam  Constitutional:       Appearance: He is ill-appearing.      Comments: Sitting in bed,  awake, able to follow commands   HENT:      Head: Normocephalic.      Comments: Scalp incisions - clean and dry  Contusions right face/ right periorbital edema/ecchymosis       Right Ear: External ear normal.      Left Ear: External ear normal.      Nose:      Comments: NGT     Mouth/Throat:      Mouth: Mucous membranes are dry.      Pharynx: Oropharynx is clear.   Cardiovascular:      Rate and Rhythm: Normal rate and regular rhythm.   Pulmonary:      Comments: Right clavicle deformity (present on admission secondary to right clavicle fracture)  Productive cough  Abdominal:      Palpations: Abdomen is soft.   Musculoskeletal:         General: No swelling.      Comments: Bilateral upper extremity mild pitting edema, improved from yesterday   Skin:     General: Skin is warm and dry.      Findings: No erythema.      Comments: Erythema LUE    Neurological:      Comments: Awakens to voice, follows commands          Significant Labs:  All pertinent labs from the last 24 hours have been reviewed.    Significant Diagnostics:  I have reviewed all pertinent imaging results/findings within the past 24 hours.    Assessment/Plan:     Active Diagnoses:    Diagnosis Date Noted POA    PRINCIPAL PROBLEM:  Traumatic subdural hematoma (SDH) [S06.5XAA] 03/27/2024 Yes    Hemopneumothorax on right [J94.2] 04/02/2024 Yes    Contusion of right lung [S27.321A] 04/02/2024 Yes    Adrenal hemorrhage [E27.49] 04/02/2024 Yes    Scalp laceration [S01.01XA] 04/02/2024 Yes    Elbow laceration, left, initial encounter [S51.012A] 04/02/2024 Yes    Acute blood loss anemia [D62] 03/29/2024 Yes    Acute hypoxemic respiratory failure [J96.01] 03/27/2024 No    Aspiration pneumonia due to gastric secretions [J69.0] 03/27/2024 No    Closed fracture of multiple ribs [S22.49XA] 03/27/2024 Yes    Closed nondisplaced fracture of sternal end of right clavicle [S42.017A] 03/27/2024 Yes      Problems Resolved During this Admission:     Neuro  TBI/worsening  subdural hematoma with mass effect and midline shift s/p emergent craniotomy 3/27/2024 - Continue serial neuro checks, HOB >30 degrees, Keppra completed   Promote sleep-wake cycles, out of bed, lights on in the morning  Multimodal pain control   Consider seroquel qPM     Respiratory  Acute respiratory failure secondary to trauma - extubated 03/29/2024  CXR with improving pleural effusion and opacities   S/p R pleural drain , monitor I/O   Bipap qHS   Empiric cefepime x 7 days   Pulmonary toilet with nebs, suction, CPT    Cardiovascular  Troponin trended to normal   Echocardiogram reviewed   Monitor BP/HR/tele     GI  Tube feeds, bowel regimen.    SLP following, remains NPO following MBSS    SSI ; HA1C in AM      Renal  Daily labs   continue to monitor I/Os  Diuresis PRN     Heme  Transfuse if hemoglobin less than 7 or patient with signs of active bleeding     Prophylaxis  DVT prophylaxis - SCDs Lovenox       CM for neuro rehab       ELIAZAR Herrera-BC  General Surgery  [facility] - [facility] [place] ICU

## 2024-04-07 NOTE — PT/OT/SLP PROGRESS
Speech Language Pathology      Anayeli Taylor  MRN: [id number]    Patient not seen today secondary to pt asleep and not able to maintain alertness to participate in dysphagia therapy and/or trials of ice chips. Will follow-up as pt appropriate and per schedule.

## 2024-04-08 LAB
ALBUMIN SERPL-MCNC: 2 G/DL (ref 3.4–4.8)
ALBUMIN/GLOB SERPL: 0.6 RATIO (ref 1.1–2)
ALP SERPL-CCNC: 212 UNIT/L (ref 40–150)
ALT SERPL-CCNC: 124 UNIT/L (ref 0–55)
AST SERPL-CCNC: 114 UNIT/L (ref 5–34)
BASOPHILS # BLD AUTO: 0.03 X10(3)/MCL
BASOPHILS NFR BLD AUTO: 0.3 %
BILIRUB SERPL-MCNC: 0.5 MG/DL
BUN SERPL-MCNC: 21.9 MG/DL (ref 8.4–25.7)
CALCIUM SERPL-MCNC: 8 MG/DL (ref 8.8–10)
CHLORIDE SERPL-SCNC: 106 MMOL/L (ref 98–107)
CO2 SERPL-SCNC: 24 MMOL/L (ref 23–31)
CREAT SERPL-MCNC: 0.57 MG/DL (ref 0.73–1.18)
CRP SERPL-MCNC: 107.3 MG/L
EOSINOPHIL # BLD AUTO: 0.21 X10(3)/MCL (ref 0–0.9)
EOSINOPHIL NFR BLD AUTO: 2.2 %
ERYTHROCYTE [DISTWIDTH] IN BLOOD BY AUTOMATED COUNT: 15.7 % (ref 11.5–17)
EST. AVERAGE GLUCOSE BLD GHB EST-MCNC: 139.9 MG/DL
GFR SERPLBLD CREATININE-BSD FMLA CKD-EPI: >60 MLS/MIN/1.73/M2
GLOBULIN SER-MCNC: 3.3 GM/DL (ref 2.4–3.5)
GLUCOSE SERPL-MCNC: 160 MG/DL (ref 82–115)
HBA1C MFR BLD: 6.5 %
HCT VFR BLD AUTO: 24.6 % (ref 42–52)
HGB BLD-MCNC: 7.8 G/DL (ref 14–18)
IMM GRANULOCYTES # BLD AUTO: 0.12 X10(3)/MCL (ref 0–0.04)
IMM GRANULOCYTES NFR BLD AUTO: 1.2 %
LYMPHOCYTES # BLD AUTO: 1.77 X10(3)/MCL (ref 0.6–4.6)
LYMPHOCYTES NFR BLD AUTO: 18.2 %
MAGNESIUM SERPL-MCNC: 2.3 MG/DL (ref 1.6–2.6)
MCH RBC QN AUTO: 29.5 PG (ref 27–31)
MCHC RBC AUTO-ENTMCNC: 31.7 G/DL (ref 33–36)
MCV RBC AUTO: 93.2 FL (ref 80–94)
MONOCYTES # BLD AUTO: 0.74 X10(3)/MCL (ref 0.1–1.3)
MONOCYTES NFR BLD AUTO: 7.6 %
NEUTROPHILS # BLD AUTO: 6.86 X10(3)/MCL (ref 2.1–9.2)
NEUTROPHILS NFR BLD AUTO: 70.5 %
NRBC BLD AUTO-RTO: 0.5 %
PHOSPHATE SERPL-MCNC: 2.5 MG/DL (ref 2.3–4.7)
PLATELET # BLD AUTO: 569 X10(3)/MCL (ref 130–400)
PMV BLD AUTO: 9.3 FL (ref 7.4–10.4)
POCT GLUCOSE: 138 MG/DL (ref 70–110)
POCT GLUCOSE: 149 MG/DL (ref 70–110)
POCT GLUCOSE: 163 MG/DL (ref 70–110)
POCT GLUCOSE: 163 MG/DL (ref 70–110)
POCT GLUCOSE: 178 MG/DL (ref 70–110)
POTASSIUM SERPL-SCNC: 4.5 MMOL/L (ref 3.5–5.1)
PREALB SERPL-MCNC: 13.8 MG/DL (ref 16–42)
PROT SERPL-MCNC: 5.3 GM/DL (ref 5.8–7.6)
RBC # BLD AUTO: 2.64 X10(6)/MCL (ref 4.7–6.1)
SODIUM SERPL-SCNC: 138 MMOL/L (ref 136–145)
WBC # SPEC AUTO: 9.73 X10(3)/MCL (ref 4.5–11.5)

## 2024-04-08 PROCEDURE — 25000003 PHARM REV CODE 250: Performed by: NURSE PRACTITIONER

## 2024-04-08 PROCEDURE — 25000003 PHARM REV CODE 250

## 2024-04-08 PROCEDURE — 94640 AIRWAY INHALATION TREATMENT: CPT

## 2024-04-08 PROCEDURE — 94760 N-INVAS EAR/PLS OXIMETRY 1: CPT

## 2024-04-08 PROCEDURE — 83036 HEMOGLOBIN GLYCOSYLATED A1C: CPT | Performed by: NURSE PRACTITIONER

## 2024-04-08 PROCEDURE — 25000242 PHARM REV CODE 250 ALT 637 W/ HCPCS: Performed by: NURSE PRACTITIONER

## 2024-04-08 PROCEDURE — 63600175 PHARM REV CODE 636 W HCPCS: Performed by: NURSE PRACTITIONER

## 2024-04-08 PROCEDURE — 84100 ASSAY OF PHOSPHORUS: CPT | Performed by: NURSE PRACTITIONER

## 2024-04-08 PROCEDURE — 86140 C-REACTIVE PROTEIN: CPT | Performed by: NURSE PRACTITIONER

## 2024-04-08 PROCEDURE — 27000221 HC OXYGEN, UP TO 24 HOURS

## 2024-04-08 PROCEDURE — 99900031 HC PATIENT EDUCATION (STAT)

## 2024-04-08 PROCEDURE — 83735 ASSAY OF MAGNESIUM: CPT | Performed by: NURSE PRACTITIONER

## 2024-04-08 PROCEDURE — 97530 THERAPEUTIC ACTIVITIES: CPT | Mod: CO

## 2024-04-08 PROCEDURE — 21400001 HC TELEMETRY ROOM

## 2024-04-08 PROCEDURE — 99900035 HC TECH TIME PER 15 MIN (STAT)

## 2024-04-08 PROCEDURE — 85025 COMPLETE CBC W/AUTO DIFF WBC: CPT | Performed by: NURSE PRACTITIONER

## 2024-04-08 PROCEDURE — 80053 COMPREHEN METABOLIC PANEL: CPT | Performed by: NURSE PRACTITIONER

## 2024-04-08 PROCEDURE — 94660 CPAP INITIATION&MGMT: CPT

## 2024-04-08 PROCEDURE — 11000001 HC ACUTE MED/SURG PRIVATE ROOM

## 2024-04-08 PROCEDURE — 84134 ASSAY OF PREALBUMIN: CPT | Performed by: NURSE PRACTITIONER

## 2024-04-08 PROCEDURE — 25000003 PHARM REV CODE 250: Performed by: SURGERY

## 2024-04-08 PROCEDURE — 92526 ORAL FUNCTION THERAPY: CPT

## 2024-04-08 RX ADMIN — BACITRACIN: 500 OINTMENT TOPICAL at 03:04

## 2024-04-08 RX ADMIN — METHOCARBAMOL 500 MG: 500 TABLET ORAL at 09:04

## 2024-04-08 RX ADMIN — IPRATROPIUM BROMIDE AND ALBUTEROL SULFATE 3 ML: 2.5; .5 SOLUTION RESPIRATORY (INHALATION) at 08:04

## 2024-04-08 RX ADMIN — SODIUM CHLORIDE SOLN NEBU 3% 4 ML: 3 NEBU SOLN at 08:04

## 2024-04-08 RX ADMIN — OXYCODONE HYDROCHLORIDE 5 MG: 5 TABLET ORAL at 06:04

## 2024-04-08 RX ADMIN — GUAIFENESIN 200 MG: 200 SOLUTION ORAL at 12:04

## 2024-04-08 RX ADMIN — OXYCODONE HYDROCHLORIDE 5 MG: 5 TABLET ORAL at 05:04

## 2024-04-08 RX ADMIN — CEFEPIME 2 G: 2 INJECTION, POWDER, FOR SOLUTION INTRAVENOUS at 10:04

## 2024-04-08 RX ADMIN — DOXAZOSIN 1 MG: 1 TABLET ORAL at 09:04

## 2024-04-08 RX ADMIN — LIDOCAINE 5% 2 PATCH: 700 PATCH TOPICAL at 12:04

## 2024-04-08 RX ADMIN — BACITRACIN: 500 OINTMENT TOPICAL at 09:04

## 2024-04-08 RX ADMIN — DOCUSATE SODIUM LIQUID 100 MG: 100 LIQUID ORAL at 09:04

## 2024-04-08 RX ADMIN — PROPRANOLOL HYDROCHLORIDE 20 MG: 20 TABLET ORAL at 09:04

## 2024-04-08 RX ADMIN — POLYETHYLENE GLYCOL 3350 17 G: 17 POWDER, FOR SOLUTION ORAL at 09:04

## 2024-04-08 RX ADMIN — SODIUM CHLORIDE SOLN NEBU 3% 4 ML: 3 NEBU SOLN at 12:04

## 2024-04-08 RX ADMIN — PROPRANOLOL HYDROCHLORIDE 20 MG: 20 TABLET ORAL at 03:04

## 2024-04-08 RX ADMIN — SENNOSIDES 8.6 MG: 8.6 TABLET, FILM COATED ORAL at 09:04

## 2024-04-08 RX ADMIN — THERA TABS 1 TABLET: TAB at 09:04

## 2024-04-08 RX ADMIN — CEFEPIME 2 G: 2 INJECTION, POWDER, FOR SOLUTION INTRAVENOUS at 06:04

## 2024-04-08 RX ADMIN — ENOXAPARIN SODIUM 40 MG: 40 INJECTION SUBCUTANEOUS at 09:04

## 2024-04-08 RX ADMIN — CEFEPIME 2 G: 2 INJECTION, POWDER, FOR SOLUTION INTRAVENOUS at 03:04

## 2024-04-08 RX ADMIN — IPRATROPIUM BROMIDE AND ALBUTEROL SULFATE 3 ML: 2.5; .5 SOLUTION RESPIRATORY (INHALATION) at 02:04

## 2024-04-08 RX ADMIN — METHOCARBAMOL 500 MG: 500 TABLET ORAL at 03:04

## 2024-04-08 RX ADMIN — Medication 2 TABLET: at 09:04

## 2024-04-08 RX ADMIN — QUETIAPINE FUMARATE 25 MG: 25 TABLET ORAL at 09:04

## 2024-04-08 RX ADMIN — IPRATROPIUM BROMIDE AND ALBUTEROL SULFATE 3 ML: 2.5; .5 SOLUTION RESPIRATORY (INHALATION) at 12:04

## 2024-04-08 RX ADMIN — GUAIFENESIN 200 MG: 200 SOLUTION ORAL at 05:04

## 2024-04-08 RX ADMIN — SODIUM CHLORIDE SOLN NEBU 3% 4 ML: 3 NEBU SOLN at 02:04

## 2024-04-08 RX ADMIN — GUAIFENESIN 200 MG: 200 SOLUTION ORAL at 06:04

## 2024-04-08 RX ADMIN — OXYCODONE HYDROCHLORIDE 5 MG: 5 TABLET ORAL at 12:04

## 2024-04-08 NOTE — PLAN OF CARE
Problem: Adult Inpatient Plan of Care  Goal: Optimal Comfort and Wellbeing  Outcome: Ongoing, Progressing  Intervention: Monitor Pain and Promote Comfort  Flowsheets (Taken 4/7/2024 1905)  Pain Management Interventions:   care clustered   medication offered   pillow support provided   position adjusted  Intervention: Provide Person-Centered Care  Flowsheets (Taken 4/7/2024 1905)  Trust Relationship/Rapport:   care explained   choices provided   emotional support provided   empathic listening provided   questions answered   thoughts/feelings acknowledged     Problem: Infection  Goal: Absence of Infection Signs and Symptoms  Outcome: Ongoing, Progressing  Intervention: Prevent or Manage Infection  Flowsheets (Taken 4/7/2024 1905)  Fever Reduction/Comfort Measures:   lightweight bedding   lightweight clothing  Infection Management: aseptic technique maintained  Isolation Precautions:   protective   precautions maintained     Problem: Impaired Wound Healing  Goal: Optimal Wound Healing  Outcome: Ongoing, Progressing     Problem: Fall Injury Risk  Goal: Absence of Fall and Fall-Related Injury  Outcome: Ongoing, Progressing  Intervention: Identify and Manage Contributors  Flowsheets (Taken 4/7/2024 1905)  Self-Care Promotion:   independence encouraged   BADL personal objects within reach   BADL personal routines maintained  Medication Review/Management: medications reviewed  Intervention: Promote Injury-Free Environment  Flowsheets (Taken 4/7/2024 1905)  Safety Promotion/Fall Prevention:   assistive device/personal item within reach   instructed to call staff for mobility   side rails raised x 2
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
## 2024-04-08 NOTE — PT/OT/SLP PROGRESS
Occupational Therapy   Treatment    Name: Anayeli Taylor  MRN: [id number]  Admitting Diagnosis:  Traumatic subdural hematoma (SDH)  12 Days Post-Op    Recommendations:     Recommended therapy intensity at discharge: Moderate Intensity Therapy   Discharge Equipment Recommendations:   (TBD)  Barriers to discharge:       Assessment:     Anayeli Taylor is a 73 y.o. male with a medical diagnosis of Traumatic subdural hematoma (SDH). Performance deficits affecting function are weakness, impaired endurance, impaired cognition, impaired self care skills, decreased upper extremity function, impaired functional mobility, decreased lower extremity function, impaired balance, decreased safety awareness, orthopedic precautions. Pt disoriented and required max cues for safety and redirection during session. Poor adherence to NWB pxns on RUE and needed constant cues to follow. Presents with strong posterior push while seated EOB and was Total-max A for overall mobility.     Rehab Prognosis:  Fair; patient would benefit from acute skilled OT services to address these deficits and reach maximum level of function.       Plan:     Patient to be seen 3 x/week to address the above listed problems via self-care/home management, therapeutic activities, therapeutic exercises  Plan of Care Expires: 04/30/24  Plan of Care Reviewed with: patient    Subjective     Pain/Comfort:  Pain Rating 1: 0/10    Objective:     Communicated with: RN prior to session.  Patient found left sidelying with telemetry, NG tube, oxygen, pressure relief boots, PureWick, peripheral IV, restraints, chest tube, pulse ox (continuous) upon OT entry to room.    General Precautions: Standard, aspiration    Orthopedic Precautions:RUE non weight bearing  Braces: UE Sling  Respiratory Status: Nasal cannula, flow 2 L/min  Vital Signs: Blood Pressure: 120/70     Occupational Performance:     Bed Mobility:    Patient completed Scooting/Bridging with total assistance  Patient  completed Supine to Sit with total assistance  Patient completed Sit to Supine with total assistance     Therapeutic Activities:  Pt sat EOB x10 minutes with Max-Total A for sitting balance. Was able to maintain with CGA for few second bouts. Presents with strong posterior push. Worked on anterior/posterior weight shifts to improve sitting balance however pt with limited attention to tasks and became irritated. Attempting to pull NG. Returned to supine for safety.     Therapeutic Positioning    OT interventions performed during the course of today's session in an effort to prevent and/or reduce acquired pressure injuries:   Positioning recommendations were communicated to care team . RN informed to re-order specialty bed.     [facility] 6 Click ADL:      Patient Education:  Patient provided with verbal education education regarding OT role/goals/POC and safety awareness.  Additional teaching is warranted.      Patient left supine with all lines intact, call button in reach, and restraints reapplied at end of session.    GOALS:   Multidisciplinary Problems       Occupational Therapy Goals          Problem: Occupational Therapy    Goal Priority Disciplines Outcome Interventions   Occupational Therapy Goal     OT, PT/OT Ongoing, Progressing    Description: LTG: Pt will perform basic ADLs and ADL t/fs with min A using LRAD by d/c.    STG: to be met in two weeks  1. Pt will follow >80% of simple one step commands  2. Pt will perform grooming EOB with min A.   3. Pt will perform sit<>stand with mod A in prep for ADL t/fs.  4. Pt will perform functional mobility to/from toilet with min A using LRAD.                         Time Tracking:     OT Date of Treatment: 04/08/24  OT Start Time: 1056  OT Stop Time: 1119  OT Total Time (min): 23 min    Billable Minutes:Therapeutic Activity 23    OT/LAUREEN: LAUREEN     Number of LAUREEN visits since last OT visit: 5    4/8/2024

## 2024-04-09 LAB
ALBUMIN SERPL-MCNC: 2.1 G/DL (ref 3.4–4.8)
ALBUMIN/GLOB SERPL: 0.6 RATIO (ref 1.1–2)
ALP SERPL-CCNC: 238 UNIT/L (ref 40–150)
ALT SERPL-CCNC: 154 UNIT/L (ref 0–55)
AST SERPL-CCNC: 105 UNIT/L (ref 5–34)
BASOPHILS # BLD AUTO: 0.03 X10(3)/MCL
BASOPHILS NFR BLD AUTO: 0.3 %
BILIRUB SERPL-MCNC: 0.5 MG/DL
BUN SERPL-MCNC: 23.3 MG/DL (ref 8.4–25.7)
CALCIUM SERPL-MCNC: 7.8 MG/DL (ref 8.8–10)
CHLORIDE SERPL-SCNC: 106 MMOL/L (ref 98–107)
CO2 SERPL-SCNC: 26 MMOL/L (ref 23–31)
CREAT SERPL-MCNC: 0.7 MG/DL (ref 0.73–1.18)
EOSINOPHIL # BLD AUTO: 0.18 X10(3)/MCL (ref 0–0.9)
EOSINOPHIL NFR BLD AUTO: 1.8 %
ERYTHROCYTE [DISTWIDTH] IN BLOOD BY AUTOMATED COUNT: 15.8 % (ref 11.5–17)
GFR SERPLBLD CREATININE-BSD FMLA CKD-EPI: >60 MLS/MIN/1.73/M2
GLOBULIN SER-MCNC: 3.4 GM/DL (ref 2.4–3.5)
GLUCOSE SERPL-MCNC: 154 MG/DL (ref 82–115)
HCT VFR BLD AUTO: 25.6 % (ref 42–52)
HGB BLD-MCNC: 8.1 G/DL (ref 14–18)
IMM GRANULOCYTES # BLD AUTO: 0.16 X10(3)/MCL (ref 0–0.04)
IMM GRANULOCYTES NFR BLD AUTO: 1.6 %
LYMPHOCYTES # BLD AUTO: 1.71 X10(3)/MCL (ref 0.6–4.6)
LYMPHOCYTES NFR BLD AUTO: 17.4 %
MAGNESIUM SERPL-MCNC: 2.4 MG/DL (ref 1.6–2.6)
MCH RBC QN AUTO: 29.8 PG (ref 27–31)
MCHC RBC AUTO-ENTMCNC: 31.6 G/DL (ref 33–36)
MCV RBC AUTO: 94.1 FL (ref 80–94)
MONOCYTES # BLD AUTO: 0.76 X10(3)/MCL (ref 0.1–1.3)
MONOCYTES NFR BLD AUTO: 7.7 %
NEUTROPHILS # BLD AUTO: 6.97 X10(3)/MCL (ref 2.1–9.2)
NEUTROPHILS NFR BLD AUTO: 71.2 %
NRBC BLD AUTO-RTO: 0.4 %
PHOSPHATE SERPL-MCNC: 2.5 MG/DL (ref 2.3–4.7)
PLATELET # BLD AUTO: 676 X10(3)/MCL (ref 130–400)
PLATELETS.RETICULATED NFR BLD AUTO: 1.4 % (ref 0.9–11.2)
PMV BLD AUTO: 9.1 FL (ref 7.4–10.4)
POCT GLUCOSE: 152 MG/DL (ref 70–110)
POCT GLUCOSE: 154 MG/DL (ref 70–110)
POCT GLUCOSE: 181 MG/DL (ref 70–110)
POCT GLUCOSE: 186 MG/DL (ref 70–110)
POTASSIUM SERPL-SCNC: 4.5 MMOL/L (ref 3.5–5.1)
PROT SERPL-MCNC: 5.5 GM/DL (ref 5.8–7.6)
RBC # BLD AUTO: 2.72 X10(6)/MCL (ref 4.7–6.1)
SODIUM SERPL-SCNC: 136 MMOL/L (ref 136–145)
WBC # SPEC AUTO: 9.81 X10(3)/MCL (ref 4.5–11.5)

## 2024-04-09 PROCEDURE — 25000003 PHARM REV CODE 250: Performed by: SURGERY

## 2024-04-09 PROCEDURE — 25000003 PHARM REV CODE 250

## 2024-04-09 PROCEDURE — 99900035 HC TECH TIME PER 15 MIN (STAT)

## 2024-04-09 PROCEDURE — 27000221 HC OXYGEN, UP TO 24 HOURS

## 2024-04-09 PROCEDURE — 94760 N-INVAS EAR/PLS OXIMETRY 1: CPT

## 2024-04-09 PROCEDURE — 25000003 PHARM REV CODE 250: Performed by: NURSE PRACTITIONER

## 2024-04-09 PROCEDURE — 63600175 PHARM REV CODE 636 W HCPCS: Performed by: NURSE PRACTITIONER

## 2024-04-09 PROCEDURE — 84100 ASSAY OF PHOSPHORUS: CPT | Performed by: NURSE PRACTITIONER

## 2024-04-09 PROCEDURE — 99900031 HC PATIENT EDUCATION (STAT)

## 2024-04-09 PROCEDURE — 25000242 PHARM REV CODE 250 ALT 637 W/ HCPCS: Performed by: NURSE PRACTITIONER

## 2024-04-09 PROCEDURE — 21400001 HC TELEMETRY ROOM

## 2024-04-09 PROCEDURE — 80053 COMPREHEN METABOLIC PANEL: CPT | Performed by: NURSE PRACTITIONER

## 2024-04-09 PROCEDURE — 97168 OT RE-EVAL EST PLAN CARE: CPT

## 2024-04-09 PROCEDURE — 97530 THERAPEUTIC ACTIVITIES: CPT

## 2024-04-09 PROCEDURE — 94640 AIRWAY INHALATION TREATMENT: CPT

## 2024-04-09 PROCEDURE — 85025 COMPLETE CBC W/AUTO DIFF WBC: CPT | Performed by: NURSE PRACTITIONER

## 2024-04-09 PROCEDURE — 11000001 HC ACUTE MED/SURG PRIVATE ROOM

## 2024-04-09 PROCEDURE — 83735 ASSAY OF MAGNESIUM: CPT | Performed by: NURSE PRACTITIONER

## 2024-04-09 RX ORDER — MODAFINIL 100 MG/1
200 TABLET ORAL DAILY
Status: DISCONTINUED | OUTPATIENT
Start: 2024-04-09 | End: 2024-04-15

## 2024-04-09 RX ADMIN — SENNOSIDES 8.6 MG: 8.6 TABLET, FILM COATED ORAL at 08:04

## 2024-04-09 RX ADMIN — Medication 2 TABLET: at 08:04

## 2024-04-09 RX ADMIN — SODIUM CHLORIDE SOLN NEBU 3% 4 ML: 3 NEBU SOLN at 07:04

## 2024-04-09 RX ADMIN — METHOCARBAMOL 500 MG: 500 TABLET ORAL at 08:04

## 2024-04-09 RX ADMIN — THERA TABS 1 TABLET: TAB at 08:04

## 2024-04-09 RX ADMIN — GUAIFENESIN 200 MG: 200 SOLUTION ORAL at 12:04

## 2024-04-09 RX ADMIN — MODAFINIL 200 MG: 100 TABLET ORAL at 02:04

## 2024-04-09 RX ADMIN — PROPRANOLOL HYDROCHLORIDE 20 MG: 20 TABLET ORAL at 09:04

## 2024-04-09 RX ADMIN — IPRATROPIUM BROMIDE AND ALBUTEROL SULFATE 3 ML: 2.5; .5 SOLUTION RESPIRATORY (INHALATION) at 12:04

## 2024-04-09 RX ADMIN — OXYCODONE HYDROCHLORIDE 5 MG: 5 TABLET ORAL at 02:04

## 2024-04-09 RX ADMIN — POLYETHYLENE GLYCOL 3350 17 G: 17 POWDER, FOR SOLUTION ORAL at 08:04

## 2024-04-09 RX ADMIN — QUETIAPINE FUMARATE 25 MG: 25 TABLET ORAL at 09:04

## 2024-04-09 RX ADMIN — CEFEPIME 2 G: 2 INJECTION, POWDER, FOR SOLUTION INTRAVENOUS at 12:04

## 2024-04-09 RX ADMIN — INSULIN ASPART 2 UNITS: 100 INJECTION, SOLUTION INTRAVENOUS; SUBCUTANEOUS at 05:04

## 2024-04-09 RX ADMIN — CEFEPIME 2 G: 2 INJECTION, POWDER, FOR SOLUTION INTRAVENOUS at 05:04

## 2024-04-09 RX ADMIN — IPRATROPIUM BROMIDE AND ALBUTEROL SULFATE 3 ML: 2.5; .5 SOLUTION RESPIRATORY (INHALATION) at 08:04

## 2024-04-09 RX ADMIN — SODIUM CHLORIDE SOLN NEBU 3% 4 ML: 3 NEBU SOLN at 08:04

## 2024-04-09 RX ADMIN — POLYETHYLENE GLYCOL 3350 17 G: 17 POWDER, FOR SOLUTION ORAL at 09:04

## 2024-04-09 RX ADMIN — BACITRACIN: 500 OINTMENT TOPICAL at 09:04

## 2024-04-09 RX ADMIN — ENOXAPARIN SODIUM 40 MG: 40 INJECTION SUBCUTANEOUS at 09:04

## 2024-04-09 RX ADMIN — PROPRANOLOL HYDROCHLORIDE 20 MG: 20 TABLET ORAL at 03:04

## 2024-04-09 RX ADMIN — SODIUM CHLORIDE SOLN NEBU 3% 4 ML: 3 NEBU SOLN at 01:04

## 2024-04-09 RX ADMIN — PROPRANOLOL HYDROCHLORIDE 20 MG: 20 TABLET ORAL at 08:04

## 2024-04-09 RX ADMIN — ENOXAPARIN SODIUM 40 MG: 40 INJECTION SUBCUTANEOUS at 08:04

## 2024-04-09 RX ADMIN — DOXAZOSIN 1 MG: 1 TABLET ORAL at 08:04

## 2024-04-09 RX ADMIN — IPRATROPIUM BROMIDE AND ALBUTEROL SULFATE 3 ML: 2.5; .5 SOLUTION RESPIRATORY (INHALATION) at 01:04

## 2024-04-09 RX ADMIN — DOCUSATE SODIUM LIQUID 100 MG: 100 LIQUID ORAL at 08:04

## 2024-04-09 RX ADMIN — CEFEPIME 2 G: 2 INJECTION, POWDER, FOR SOLUTION INTRAVENOUS at 10:04

## 2024-04-09 RX ADMIN — LIDOCAINE 5% 2 PATCH: 700 PATCH TOPICAL at 12:04

## 2024-04-09 RX ADMIN — IPRATROPIUM BROMIDE AND ALBUTEROL SULFATE 3 ML: 2.5; .5 SOLUTION RESPIRATORY (INHALATION) at 07:04

## 2024-04-09 RX ADMIN — METHOCARBAMOL 500 MG: 500 TABLET ORAL at 09:04

## 2024-04-09 RX ADMIN — METHOCARBAMOL 500 MG: 500 TABLET ORAL at 03:04

## 2024-04-09 RX ADMIN — GUAIFENESIN 200 MG: 200 SOLUTION ORAL at 05:04

## 2024-04-09 RX ADMIN — BACITRACIN: 500 OINTMENT TOPICAL at 03:04

## 2024-04-09 RX ADMIN — SODIUM CHLORIDE SOLN NEBU 3% 4 ML: 3 NEBU SOLN at 03:04

## 2024-04-09 RX ADMIN — DOCUSATE SODIUM LIQUID 100 MG: 100 LIQUID ORAL at 09:04
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
## 2024-04-09 NOTE — PT/OT/SLP RE-EVAL
"Occupational Therapy   Re-evaluation    Name: Anayeli Taylor  MRN: [id number]    Recommendations:     Discharge therapy intensity: Moderate Intensity Therapy   Discharge Equipment Recommendations:   (TBD)  Barriers to discharge:   (ongoing medical needs; severity of deficits)    Assessment:     Anayeli Taylor is a 73 y.o. male with a medical diagnosis of SDH s/p crani; acute respiratory failure requiring intubation now extubated, R pneumothorax, rib fxs, R medial clavicle and acromion fx (non-op), right adrenal hematoma. Intermittent tachycardia overnight. He presents with the following performance deficits affecting function: weakness, impaired endurance, impaired self care skills, impaired functional mobility, gait instability, impaired balance, impaired cognition, decreased coordination, decreased upper extremity function, decreased lower extremity function, decreased safety awareness, orthopedic precautions. Pt continues to require total assistance for all mobility and self care needs at this time. Resistive to functional mobility and PROM; does not demo purposeful movement on command. Has made limited progress toward OT goals, therefore will trial therapy the rest of the week to determine appropriate POC.  Pt soiled and requires bed bath at cessation of session, RN and CNA alerted.    Rehab Prognosis: Poor; patient would benefit from acute skilled OT services to address these deficits and reach maximum level of function.       Plan:     Patient to be seen 3 x/week to address the above listed problems via self-care/home management, therapeutic activities, therapeutic exercises  Plan of Care Expires: 04/30/24  Plan of Care Reviewed with: patient    Subjective     Chief Complaint: "stop moving me"  Patient/Family Comments/goals: "I want to be in bed"    Pain/Comfort:  Pain Rating 1:  (resistive to knee flexion, does not objectively state)  Pain Addressed 1: Reposition, Distraction, Cessation of Activity    Patients " cultural, spiritual, Orthodox conflicts given the current situation: no    Objective:     OT communicated with NSG prior to session.      Patient was found HOB elevated with peripheral IV, NG tube, SCD, telemetry, pulse ox (continuous), PureWick upon OT entry to room.    General Precautions: Standard, aspiration  Orthopedic Precautions: RUE non weight bearing (pendulum ROM ok, full ROM distally)  Braces: UE Sling  Room air  Vital Signs: WNL throughout    Bed Mobility:    Patient completed Scooting/Bridging with total assistance and 2 persons  Patient completed Supine to Sit with total assistance and 2 persons  Patient completed Sit to Supine with total assistance and 2 persons  Sitting EOB with total assistance, severe posterior lean vs pushing posteriorly    Functional Mobility/Transfers:  Patient completed Sit <> Stand Transfer with total assistance and of 2 persons  with  hand-held assist   Functional Mobility: declines    Activities of Daily Living:  Total assistance    Functional Cognition:  Impaired. Disoriented. Does not follow simple commands appropriately.    Visual Perceptual Skills:  Fixates appropriately in horizontal plane    Upper Extremity Function:  Does not allow PROM of UEs; actively trying to weightbear with RUE, not redirectable with verbal cues    Balance:   Static sitting and standing - impaired    Therapeutic Positioning  Risk for acquired pressure injuries is significantly increased due to relative decrease in mobility d/t hospitalization , impaired mobility, incontinence, and severity of deficits resulting in prolonged immobility .    OT interventions performed during the course of today's session in an effort to prevent and/or reduce acquired pressure injuries:   Education was provided on benefits of and recommendations for therapeutic positioning  Positioning recommendations were communicated to care team     Skin assessment: sacrum not visualized 2/2 brief on throughout whole session, Pt  required bed bath from nsg at cessation; communicated about no brief in bed to eliminate pressure sores    OT recommendations for therapeutic positioning throughout hospitalization:   Follow [facility] Pressure Injury Prevention Protocol  Geomat recommended for sacral protection while [facility]  [employer]    Patient Education:  Patient provided with verbal education education regarding OT role/goals/POC, fall prevention, safety awareness, Discharge/DME recommendations, pressure ulcer prevention, home exercise program , and orthopedic precautions .  Additional teaching is warranted.     Patient left HOB elevated with all lines intact, call button in reach, bed alarm on, and NSG notified    GOALS:   Multidisciplinary Problems       Occupational Therapy Goals          Problem: Occupational Therapy    Goal Priority Disciplines Outcome Interventions   Occupational Therapy Goal     OT, PT/OT Ongoing, Progressing    Description: LTG: Pt will perform basic ADLs and ADL t/fs with min A using LRAD by d/c.    STG: to be met in two weeks  1. Pt will follow >80% of simple one step commands  2. Pt will perform grooming EOB with min A.   3. Pt will perform sit<>stand with mod A in prep for ADL t/fs.  4. Pt will perform functional mobility to/from toilet with min A using LRAD.                         History:     History reviewed. No pertinent past medical history.      Past Surgical History:   Procedure Laterality Date    CRANIOTOMY FOR EVACUATION OF SUBDURAL HEMATOMA Right 3/27/2024    Procedure: CRANIOTOMY, FOR SUBDURAL HEMATOMA EVACUATION-RIGHT;  Surgeon: Davonte Resendiz MD;  [place] [facility] [place];  Service: Neurosurgery;  Laterality: Right;    INSERTION, CENTRAL VENOUS ACCESS DEVICE Right 3/27/2024    Procedure: Insertion,central venous access device;  Surgeon: Davonte Resendiz MD;  Location: [facility] OR;  Service: Neurosurgery;  Laterality: Right;  per Dr. Woody- start at 0405       Time Tracking:     OT Date of Treatment:     OT Start Time: 1323  OT Stop Time: 1332  OT Total Time (min): 9 min    Billable Minutes:Re-eval 1    4/9/2024

## 2024-04-09 NOTE — PROGRESS NOTES
04/09/24 1533   Missed Time Reason   SLP Attempted Eval Date 04/09/24   SLP Attempted Eval Time 1245   Missed Treatment Reason Patient fatigue  (lethargic/confused)

## 2024-04-09 NOTE — PLAN OF CARE
Problem: Adult Inpatient Plan of Care  Goal: Optimal Comfort and Wellbeing  Outcome: Ongoing, Progressing  Intervention: Monitor Pain and Promote Comfort  Flowsheets (Taken 4/8/2024 1918)  Pain Management Interventions:   care clustered   medication offered   pillow support provided   position adjusted  Intervention: Provide Person-Centered Care  Flowsheets (Taken 4/8/2024 1918)  Trust Relationship/Rapport:   care explained   emotional support provided   empathic listening provided   questions answered     Problem: Infection  Goal: Absence of Infection Signs and Symptoms  Outcome: Ongoing, Progressing  Intervention: Prevent or Manage Infection  Flowsheets (Taken 4/8/2024 1918)  Fever Reduction/Comfort Measures:   lightweight bedding   lightweight clothing  Infection Management: aseptic technique maintained  Isolation Precautions:   protective   precautions maintained     Problem: Fall Injury Risk  Goal: Absence of Fall and Fall-Related Injury  Outcome: Ongoing, Progressing  Intervention: Identify and Manage Contributors  Flowsheets (Taken 4/8/2024 1918)  Self-Care Promotion:   independence encouraged   BADL personal objects within reach   BADL personal routines maintained  Medication Review/Management: medications reviewed  Intervention: Promote Injury-Free Environment  Flowsheets (Taken 4/8/2024 1918)  Safety Promotion/Fall Prevention:   assistive device/personal item within reach   side rails raised x 2   instructed to call staff for mobility

## 2024-04-09 NOTE — PROGRESS NOTES
"   Trauma Surgery   Progress Note  Admit Date: 3/26/2024  HD#14  POD#13 Days Post-Op    Subjective:   Interval history:  Afebrile. Intermittent tachycardia into 130s overnight. O2 sat 97% ion 1.5L this AM. Patient resting on rounds, easily awakens to voice. Denies any pain or discomfort.     Home Meds:   Current Outpatient Medications   Medication Instructions    HYDROcodone-acetaminophen (NORCO) 7.5-325 mg per tablet 1 tablet, Oral, Every 6 hours PRN    olmesartan-hydrochlorothiazide (BENICAR HCT) 20-12.5 mg per tablet 1 tablet, Oral, Daily    sildenafiL (VIAGRA) 50 mg, Oral, Daily PRN      Scheduled Meds:   albuterol-ipratropium  3 mL Nebulization Q6H    bacitracin   Topical (Top) TID    calcium-vitamin D3  2 tablet Oral Daily    ceFEPime IV (PEDS and ADULTS)  2 g Intravenous Q8H    docusate  100 mg Per NG tube BID    doxazosin  1 mg Per NG tube Daily    enoxparin  40 mg Subcutaneous Q12H (prophylaxis, 0900/2100)    guaiFENesin 100 mg/5 ml  200 mg Per NG tube Q6H    LIDOcaine  2 patch Transdermal Q24H    methocarbamoL  500 mg Per NG tube TID    multivitamin  1 tablet Per NG tube Daily    polyethylene glycol  17 g Per NG tube BID    propranoloL  20 mg Per NG tube TID    QUEtiapine  25 mg Per NG tube QHS    senna  8.6 mg Per NG tube Daily    sodium chloride 3%  4 mL Nebulization Q6H     Continuous Infusions:  PRN Meds:bisacodyL, dextrose 10%, dextrose 10%, glucagon (human recombinant), hydrALAZINE, insulin aspart U-100, labetaloL, LORazepam, melatonin, ondansetron, oxyCODONE     Objective:     VITAL SIGNS: 24 HR MIN & MAX LAST   Temp  Min: 98.1 °F (36.7 °C)  Max: 99.8 °F (37.7 °C)  99.4 °F (37.4 °C)   BP  Min: 120/70  Max: 142/68  121/63    Pulse  Min: 79  Max: 125  91    Resp  Min: 15  Max: 21  17    SpO2  Min: 95 %  Max: 100 %  95 %      HT: 5' 5.98" (167.6 cm)  WT: 55.2 kg (121 lb 11.1 oz)  BMI: 19.7     Intake/output:  Intake/Output - Last 3 Shifts         04/07 0700  04/08 0659 04/08 0700 04/09 0659    NG/GT " 75 75    Total Intake(mL/kg) 75 (1.4) 75 (1.4)    Urine (mL/kg/hr) 1400 (1.1) 650 (0.5)    Stool 0     Chest Tube 0     Total Output 1400 650    Net -1325 -575          Urine Occurrence 3 x     Stool Occurrence 1 x             Intake/Output Summary (Last 24 hours) at 4/9/2024 0653  Last data filed at 4/8/2024 2000  Gross per 24 hour   Intake 75 ml   Output 650 ml   Net -575 ml         Lines/drains/airway:       Peripheral IV - Single Lumen 04/08/24 2245 20 G Left;Posterior Forearm (Active)   Site Assessment Clean;Dry;Intact 04/08/24 2000   Extremity Assessment Distal to IV No abnormal discoloration 04/08/24 2000   Line Status Capped 04/08/24 2000   Dressing Status Clean;Dry;Intact 04/08/24 2000   Dressing Intervention Integrity maintained 04/08/24 2000   Number of days: 0            NG/OG Tube 03/30/24 0300 16 Fr. Right nostril (Active)   Placement Check placement verified by distal tube length measurement 04/06/24 0400   Distal Tube Length (cm) 60 04/07/24 0800   Tolerance no signs/symptoms of discomfort 04/08/24 2000   Securement secured to nostril center 04/08/24 2000   Clamp Status/Tolerance unclamped;no abdominal discomfort 04/08/24 2000   Suction Setting/Drainage Method suction at the bedside 04/08/24 2000   Insertion Site Appearance no redness, warmth, tenderness, skin breakdown, drainage 04/08/24 2000   Drainage None 04/08/24 2000   Flush/Irrigation flushed w/;water 04/08/24 2000   Feeding Type continuous;by pump 04/08/24 2000   Feeding Action feeding continued 04/08/24 2000   Current Rate (mL/hr) 55 mL/hr 04/08/24 2000   Goal Rate (mL/hr) 55 mL/hr 04/08/24 2000   Intake (mL) 474 mL 04/06/24 0525   Water Bolus (mL) 75 mL 04/08/24 2000   Rate Formula Tube Feeding (mL/hr) 55 mL/hr 04/08/24 2000   Formula Name impact peptide 1.5 04/08/24 2000   Intake (mL) - Formula Tube Feeding 535 04/03/24 0559   Number of days: 10       Female External Urinary Catheter w/ Suction 04/07/24 0800 (Active)   Number of days: 1  "      Physical examination:  Gen: NAD, AAOx1, answering questions appropriately  HEENT: Normocephalic, scalp incision C/D/I. Facial contusions healing. NGT in place with TFs infusing.  CV: Tachycardic 110s-120s  Resp: NWOB, R clavicle deformity  Abd: S/NT/ND, last BM 4/8  Msk: moving all extremities spontaneously and purposefully  Neuro: Disoriented but awakens to voice and follows commands   Skin/wounds: Warm, dry    Labs:  Renal:  Recent Labs     04/07/24  0353 04/08/24  0345 04/09/24  0452   BUN 24.8 21.9 23.3   CREATININE 0.61* 0.57* 0.70*     No results for input(s): "LACTIC" in the last 72 hours.  FEN/GI:  Recent Labs     04/07/24  0353 04/08/24  0345 04/09/24  0452    138 136   K 4.4 4.5 4.5   CO2 26 24 26   CALCIUM 8.1* 8.0* 7.8*   MG 2.30 2.30 2.40   PHOS 2.4 2.5 2.5   ALBUMIN 2.1* 2.0* 2.1*   BILITOT 0.5 0.5 0.5   AST 67* 114* 105*   ALKPHOS 176* 212* 238*   ALT 85* 124* 154*     Heme:  Recent Labs     04/07/24  0353 04/08/24  0345 04/09/24  0338   HGB 7.7* 7.8* 8.1*   HCT 23.8* 24.6* 25.6*   * 569* 676*     ID:  Recent Labs     04/07/24  0353 04/08/24  0345 04/09/24  0338   WBC 9.68 9.73 9.81     CBG:  Recent Labs     04/07/24  0353 04/08/24  0345 04/09/24  0452   GLUCOSE 199* 160* 154*      Recent Labs     04/07/24  1744 04/07/24  2222 04/08/24  0528 04/08/24  0941 04/08/24  1122 04/08/24  1807 04/08/24  2002 04/09/24  0505   POCTGLUCOSE 156* 162* 138* 178* 163* 149* 163* 154*      Cardiovascular:  Recent Labs   Lab 04/03/24  0711 04/03/24  1645 04/03/24  2209   TROPONINI 0.200* <0.010 <0.010     I have reviewed all pertinent lab results within the past 24 hours.    Imaging:  X-Ray Chest 1 View   Final Result      No definitive pneumothorax.         Electronically signed by: Chuy Quintero   Date:    04/08/2024   Time:    19:57      X-Ray Chest 1 View   Final Result      Possible very small right superior pneumothorax.         Electronically signed by: Chuy Quintero   Date:    04/08/2024 "   Time:    16:15      X-Ray Chest 1 View   Final Result      No significant change         Electronically signed by: Francis Ortiz   Date:    04/08/2024   Time:    07:37      X-Ray Chest 1 View   Final Result      Decrease in right pleural fluid collection.         Electronically signed by: Otilio Prieto MD   Date:    04/07/2024   Time:    09:42      X-Ray Chest 1 View   Final Result      No significant detrimental change.  Stable findings as above.         Electronically signed by: Damian Martinez   Date:    04/06/2024   Time:    09:51      CT Chest With Contrast   Final Result      Interval development of patchy interstitial infiltrates in the left upper lobe with atelectasis seen in the left lower lobe      Large right-sided pleural effusion with extensive atelectasis in the right lower lobe and right middle lobe.      Interval resolution of the right-sided pneumothorax      Multiple fractures seen stable since prior examination         Electronically signed by: Bc Bellamy   Date:    04/05/2024   Time:    10:39      X-Ray Chest 1 View   Final Result      Interval development of left-sided pleural effusion.      Interval development of confluent airspace opacities in the left perihilar region.      Worsening of confluent airspace opacities in the right upper lobe.      No other change         Electronically signed by: Francis Ortiz   Date:    04/05/2024   Time:    08:59      X-Ray Chest 1 View   Final Result      Fl Modified Barium Swallow Speech   Final Result      X-Ray Chest 1 View   Final Result      Slightly more blunting of the right costophrenic angle and more haziness at the right base.      No other interval change         Electronically signed by: Francis Ortiz   Date:    04/03/2024   Time:    05:42      X-Ray Chest 1 View   Final Result      No significant change         Electronically signed by: Francis Ortiz   Date:    04/02/2024   Time:    07:46      XR Gastric tube check,  non-radiologist performed   Final Result      As above.         Electronically signed by: Cisco Rankin   Date:    04/01/2024   Time:    12:03      X-Ray Chest 1 View   Final Result      As above.         Electronically signed by: Jose M Kaur   Date:    04/01/2024   Time:    07:48      X-Ray Chest 1 View   Final Result      Possible small right apical pneumothorax, unchanged from previous.      Lung volumes are low with associated atelectatic change.         Electronically signed by: Leila Salgado   Date:    03/31/2024   Time:    12:19      X-Ray Clavicle Right   Final Result      Degenerative arthritic changes.         Electronically signed by: Luis Anglin   Date:    03/30/2024   Time:    15:19      X-Ray Chest 1 View   Final Result      No significant interval change         Electronically signed by: Luis Anglin   Date:    03/30/2024   Time:    07:49      X-Ray Chest 1 View   Final Result      As above.         Electronically signed by: Jose M Kaur   Date:    03/29/2024   Time:    09:09      XR Gastric tube check, non-radiologist performed   Final Result      Enteric tube extends well into the stomach.         Electronically signed by: Cisco Rankin   Date:    03/28/2024   Time:    12:23      X-Ray Chest 1 View   Final Result      Lung volumes are low with associated atelectatic change.         Electronically signed by: Leila Salgado   Date:    03/28/2024   Time:    06:20      CT Head Without Contrast   Final Result      Postoperative changes following right-sided craniotomy for evacuation of subdural hemorrhage with decreased mass effect.         Electronically signed by: Joyce Calderon   Date:    03/27/2024   Time:    12:20      X-Ray Chest 1 View   Final Result      As above.         Electronically signed by: Jose M Kaur   Date:    03/27/2024   Time:    07:27      CT 3D RECON WITHOUT INDEPENDENT WS   Final Result   FINDINGS/   3D reconstructions of the thorax were created based on source data  from accession number [id number].  Please see that report for details.         Electronically signed by: Cisco Rankin   Date:    03/27/2024   Time:    07:49      CTA Head and Neck (xpd)   Final Result      1. No large vessel occlusion or flow-limiting stenosis.   2. Suspected hemorrhage along the right anterior temporal convexity.   3. Otherwise no evidence of acute arterial injury.   No major change from the [facility] dictation.         Electronically signed by: Joyce Calderon   Date:    03/27/2024   Time:    12:43      CT Head Without Contrast   Final Result      Significant increase in size of right-sided subdural hemorrhage and bilateral subarachnoid hemorrhage, with 1.5 cm of leftward midline shift.      No significant change from the [facility] interpretation.         Electronically signed by: Joyce Calderon   Date:    03/27/2024   Time:    12:19      X-Ray Chest 1 View   Final Result      As above.         Electronically signed by: JoseM Kaur   Date:    03/27/2024   Time:    07:28      X-ray Shoulder 2 or More Views Right   Final Result      Nondisplaced fracture of the acromion      Right rib fractures      Known clavicular head fracture better demonstrated on previous CT.         Electronically signed by: Leila Salgado   Date:    03/27/2024   Time:    11:25      X-Ray Elbow 2 Views Right   Final Result      No acute osseous abnormality.         Electronically signed by: Leila Salgado   Date:    03/27/2024   Time:    11:22      X-Ray Elbow 2 Views Left   Final Result      No acute osseous abnormality taking into account suboptimal positioning.  Unable to adequately assess the radial head/neck.         Electronically signed by: Leila Salgado   Date:    03/27/2024   Time:    11:20      CTA Chest Aorta Non Coronary   Final Result      Multiple right rib fractures with small right pneumothorax.  Areas of contusion laterally in the right lung near the rib fractures.      Fractures of the right  clavicle and right acromion.      No significant discrepancy between my interpretation and the preliminary radiology report.         Electronically signed by: Chuy Quintero   Date:    03/27/2024   Time:    08:27      CT Abdomen Pelvis With IV Contrast NO Oral Contrast   Final Result      Right adrenal hemorrhage with approximately 5 cm hematoma.      Right L1 through L3 transverse process fractures.      No significant discrepancy between my interpretation and the preliminary radiology report.         Electronically signed by: Chuy Quintero   Date:    03/27/2024   Time:    08:02      CT Cervical Spine Without Contrast   Final Result      No acute cervical spine fracture or traumatic malalignment identified.      No significant discrepancy between my interpretation and the preliminary radiology report.         Electronically signed by: Chuy Quintero   Date:    03/27/2024   Time:    08:47      CT Maxillofacial Without Contrast   Final Result      Right periorbital soft tissue swelling/contusion.  Small volume left sphenoid sinus fluid may be hemosinus.      No significant discrepancy between my interpretation and the preliminary radiology report.         Electronically signed by: Chuy Quintero   Date:    03/27/2024   Time:    08:56      CT Head Without Contrast   Final Result      Bilateral subarachnoid hemorrhage with right-sided subdural hematoma and trace intraventricular blood.      No major discrepancy with the preliminary radiology report.         Electronically signed by: Chuy Quintero   Date:    03/27/2024   Time:    08:35      X-Ray Pelvis Routine AP   Final Result      No acute findings.         Electronically signed by: Cisco Rankin   Date:    03/27/2024   Time:    07:48      X-Ray Chest 1 View   Final Result      There is a small right pneumothorax better demonstrated on CT. Right rib fractures also better demonstrated on CT.         Electronically signed by: Cisco Rankin   Date:    03/27/2024    Time:    07:47      X-Ray Chest 1 View    (Results Pending)      I have reviewed all pertinent imaging results/findings within the past 24 hours.    Micro/Path/Other:  Microbiology Results (last 7 days)       ** No results found for the last 168 hours. **           Pathology Results  (Last 7 days)      None             Problems list:  Active Problem List with Overview Notes    Diagnosis Date Noted    Hemopneumothorax on right 04/02/2024    Contusion of right lung 04/02/2024    Adrenal hemorrhage 04/02/2024    Scalp laceration 04/02/2024    Elbow laceration, left, initial encounter 04/02/2024    Acute blood loss anemia 03/29/2024    Acute hypoxemic respiratory failure 03/27/2024    Traumatic subdural hematoma (SDH) 03/27/2024    Aspiration pneumonia due to gastric secretions 03/27/2024    Closed fracture of multiple ribs 03/27/2024    Closed nondisplaced fracture of sternal end of right clavicle 03/27/2024        Assessment & Plan:     SDH, SAH  - s/p emergent craniotomy 3/27/24  - Serial neuro checks q2h  - Keep HOB > 30 degrees  - Keppra completed 4/2/24  - Promote good sleep-wake cycles  - Seroquel qHS    R 3-9 rib fractures, R PTX/FLIP, R pulmonary contusions  - s/p acute respiratory failure s/t trauma, resolved - extubated 3/29/24  - CXR with no PTX post removal of pleural drain  - BiPAP at night  - Cefepmine x 7 days, end date 4/10/24  - Encourage frequent IS  - Good pulmonary hygiene  - Nebs q6h    R clavicle fracture  - Evaluated by Orthopedics, non-op management  - Sling for comfort  - OK for ADLs, no overhead activity  - Pendulum ROM shoulder, full ROM distally    Dysphagia  - Continue working with ST  - TF until able to tolerate po diet  - MBSS planned for today, 4/9    Auto vs Scooter  - Daily labs  - MMPC  - PT/OT  - SCDs & Lovenox 40 mg q12h for VTE ppx  - CM following for neuro rehab placement    Jeanette Ramos, SYMONEP-BC, FNP-BC  Trauma Surgery  [facility]  C: [phone]

## 2024-04-09 NOTE — PT/OT/SLP PROGRESS
"Physical Therapy Treatment    Patient Name:  Anayeli Taylor   MRN:  [id number]    Recommendations:     Discharge therapy intensity: Moderate Intensity Therapy   Discharge Equipment Recommendations:  (TBD by next level of care)  Barriers to discharge: None    Assessment:     Anayeli Taylor is a 73 y.o. male admitted with a medical diagnosis of SDH s/p crani; acute respiratory failure requiring intubation now extubated, R pneumothorax, rib fxs, R medial clavicle and acromion fx (non-op), right adrenal hematoma. .  He presents with the following impairments/functional limitations: weakness, impaired endurance, impaired self care skills, impaired functional mobility, gait instability, impaired balance, impaired cognition, decreased upper extremity function, decreased lower extremity function, decreased safety awareness, pain, decreased ROM.    Rehab Prognosis: Poor; patient would benefit from acute skilled PT services to address these deficits and reach maximum level of function.    Recent Surgery: Procedure(s) (LRB):  CRANIOTOMY, FOR SUBDURAL HEMATOMA EVACUATION-RIGHT (Right)  Insertion,central venous access device (Right) 13 Days Post-Op    Plan:     During this hospitalization, patient to be seen 3 x/week to address the identified rehab impairments via gait training, therapeutic activities, therapeutic exercises, neuromuscular re-education and progress toward the following goals:    Plan of Care Expires:  04/30/24    Subjective     Chief Complaint: "stop moving me"  Patient/Family Comments/goals: none  Pain/Comfort:  Pain Rating 1:  (resistive to knee flexion, does not objectively state)      Objective:     Communicated with nurse prior to session.  Patient found HOB elevated with peripheral IV, NG tube, SCD, pulse ox (continuous), PureWick upon PT entry to room.     General Precautions: Standard, aspiration, fall  Orthopedic Precautions: RUE non weight bearing  (pendulum ROM ok, full ROM distally)   Braces: UE " Sling  Respiratory Status: Room air  Skin Integrity: Visible skin intact      Functional Mobility:  Bed Mobility:     Scooting: total assistance  Supine to Sit: total assistance  Sit to Supine: total assistance  Transfers:  Sit to Stand:  total assistance with no AD  Balance: poor    Education Provided:  Role and goals of PT, transfer training, bed mobility, balance training, safety awareness, assistive device, strengthening exercises, and importance of participating in PT to return to PLOF.    Patient left HOB elevated with all lines intact, call button in reach, and bed alarm on    GOALS:   Multidisciplinary Problems       Physical Therapy Goals          Problem: Physical Therapy    Goal Priority Disciplines Outcome Goal Variances Interventions   Physical Therapy Goal     PT, PT/OT Ongoing, Progressing     Description: Goals to be met by: 24     Patient will increase functional independence with mobility by performin. Supine to sit with MInimal Assistance  2. Sit to supine with MInimal Assistance  3. Sit to stand transfer with Minimal Assistance  4. Bed to chair transfer with Minimal Assistance using LRAD  5. Gait TBD  6. Sitting at edge of bed x20 minutes with Minimal Assistance  7. Pt to follow 100% of commands                         Time Tracking:     PT Received On: 24  PT Start Time: 1320     PT Stop Time: 1335  PT Total Time (min): 15 min     Billable Minutes: Therapeutic Activity 15 minutes    Treatment Type: Treatment  PT/PTA: PT     Number of PTA visits since last PT visit: 2024

## 2024-04-09 NOTE — PLAN OF CARE
Problem: Adult Inpatient Plan of Care  Goal: Optimal Comfort and Wellbeing  Outcome: Ongoing, Progressing  Intervention: Monitor Pain and Promote Comfort  Flowsheets (Taken 4/9/2024 1838)  Pain Management Interventions:   care clustered   medication offered   pillow support provided   position adjusted  Intervention: Provide Person-Centered Care  Flowsheets (Taken 4/9/2024 1838)  Trust Relationship/Rapport:   care explained   choices provided   emotional support provided   empathic listening provided   questions answered   thoughts/feelings acknowledged     Problem: Infection  Goal: Absence of Infection Signs and Symptoms  Outcome: Ongoing, Progressing  Intervention: Prevent or Manage Infection  Flowsheets (Taken 4/9/2024 1838)  Fever Reduction/Comfort Measures:   lightweight bedding   lightweight clothing  Infection Management: aseptic technique maintained  Isolation Precautions:   protective   precautions maintained     Problem: Impaired Wound Healing  Goal: Optimal Wound Healing  Outcome: Ongoing, Progressing  Intervention: Promote Wound Healing  Flowsheets (Taken 4/9/2024 1838)  Sleep/Rest Enhancement:   awakenings minimized   consistent schedule promoted   family presence promoted   noise level reduced  Activity Management: Rolling - L1  Pain Management Interventions:   care clustered   medication offered   pillow support provided   position adjusted

## 2024-04-10 LAB
ALBUMIN SERPL-MCNC: 2.2 G/DL (ref 3.4–4.8)
ALBUMIN/GLOB SERPL: 0.6 RATIO (ref 1.1–2)
ALP SERPL-CCNC: 271 UNIT/L (ref 40–150)
ALT SERPL-CCNC: 140 UNIT/L (ref 0–55)
AST SERPL-CCNC: 77 UNIT/L (ref 5–34)
BASOPHILS # BLD AUTO: 0.06 X10(3)/MCL
BASOPHILS NFR BLD AUTO: 0.7 %
BILIRUB SERPL-MCNC: 0.5 MG/DL
BUN SERPL-MCNC: 23.7 MG/DL (ref 8.4–25.7)
CALCIUM SERPL-MCNC: 8.2 MG/DL (ref 8.8–10)
CHLORIDE SERPL-SCNC: 104 MMOL/L (ref 98–107)
CO2 SERPL-SCNC: 25 MMOL/L (ref 23–31)
CREAT SERPL-MCNC: 0.63 MG/DL (ref 0.73–1.18)
EOSINOPHIL # BLD AUTO: 0.17 X10(3)/MCL (ref 0–0.9)
EOSINOPHIL NFR BLD AUTO: 1.9 %
ERYTHROCYTE [DISTWIDTH] IN BLOOD BY AUTOMATED COUNT: 15.6 % (ref 11.5–17)
GFR SERPLBLD CREATININE-BSD FMLA CKD-EPI: >60 MLS/MIN/1.73/M2
GLOBULIN SER-MCNC: 3.7 GM/DL (ref 2.4–3.5)
GLUCOSE SERPL-MCNC: 146 MG/DL (ref 82–115)
HCT VFR BLD AUTO: 27.1 % (ref 42–52)
HGB BLD-MCNC: 8.7 G/DL (ref 14–18)
IMM GRANULOCYTES # BLD AUTO: 0.1 X10(3)/MCL (ref 0–0.04)
IMM GRANULOCYTES NFR BLD AUTO: 1.1 %
LYMPHOCYTES # BLD AUTO: 1.57 X10(3)/MCL (ref 0.6–4.6)
LYMPHOCYTES NFR BLD AUTO: 17.1 %
MAGNESIUM SERPL-MCNC: 2.3 MG/DL (ref 1.6–2.6)
MCH RBC QN AUTO: 29.8 PG (ref 27–31)
MCHC RBC AUTO-ENTMCNC: 32.1 G/DL (ref 33–36)
MCV RBC AUTO: 92.8 FL (ref 80–94)
MONOCYTES # BLD AUTO: 0.75 X10(3)/MCL (ref 0.1–1.3)
MONOCYTES NFR BLD AUTO: 8.2 %
NEUTROPHILS # BLD AUTO: 6.53 X10(3)/MCL (ref 2.1–9.2)
NEUTROPHILS NFR BLD AUTO: 71 %
NRBC BLD AUTO-RTO: 0 %
PHOSPHATE SERPL-MCNC: 2.8 MG/DL (ref 2.3–4.7)
PLATELET # BLD AUTO: 682 X10(3)/MCL (ref 130–400)
PMV BLD AUTO: 8.8 FL (ref 7.4–10.4)
POCT GLUCOSE: 127 MG/DL (ref 70–110)
POCT GLUCOSE: 160 MG/DL (ref 70–110)
POCT GLUCOSE: 174 MG/DL (ref 70–110)
POTASSIUM SERPL-SCNC: 4.6 MMOL/L (ref 3.5–5.1)
PROT SERPL-MCNC: 5.9 GM/DL (ref 5.8–7.6)
RBC # BLD AUTO: 2.92 X10(6)/MCL (ref 4.7–6.1)
SODIUM SERPL-SCNC: 137 MMOL/L (ref 136–145)
WBC # SPEC AUTO: 9.18 X10(3)/MCL (ref 4.5–11.5)

## 2024-04-10 PROCEDURE — 25000242 PHARM REV CODE 250 ALT 637 W/ HCPCS: Performed by: NURSE PRACTITIONER

## 2024-04-10 PROCEDURE — 25000003 PHARM REV CODE 250

## 2024-04-10 PROCEDURE — 97112 NEUROMUSCULAR REEDUCATION: CPT

## 2024-04-10 PROCEDURE — 80053 COMPREHEN METABOLIC PANEL: CPT | Performed by: NURSE PRACTITIONER

## 2024-04-10 PROCEDURE — 99900035 HC TECH TIME PER 15 MIN (STAT)

## 2024-04-10 PROCEDURE — 25500020 PHARM REV CODE 255: Performed by: SURGERY

## 2024-04-10 PROCEDURE — 25000003 PHARM REV CODE 250: Performed by: NURSE PRACTITIONER

## 2024-04-10 PROCEDURE — 97164 PT RE-EVAL EST PLAN CARE: CPT

## 2024-04-10 PROCEDURE — 94640 AIRWAY INHALATION TREATMENT: CPT

## 2024-04-10 PROCEDURE — 92611 MOTION FLUOROSCOPY/SWALLOW: CPT

## 2024-04-10 PROCEDURE — 21400001 HC TELEMETRY ROOM

## 2024-04-10 PROCEDURE — 25000003 PHARM REV CODE 250: Performed by: SURGERY

## 2024-04-10 PROCEDURE — A9698 NON-RAD CONTRAST MATERIALNOC: HCPCS | Performed by: SURGERY

## 2024-04-10 PROCEDURE — 63600175 PHARM REV CODE 636 W HCPCS: Performed by: NURSE PRACTITIONER

## 2024-04-10 PROCEDURE — 99900031 HC PATIENT EDUCATION (STAT)

## 2024-04-10 PROCEDURE — 94760 N-INVAS EAR/PLS OXIMETRY 1: CPT

## 2024-04-10 PROCEDURE — 84100 ASSAY OF PHOSPHORUS: CPT | Performed by: NURSE PRACTITIONER

## 2024-04-10 PROCEDURE — 11000001 HC ACUTE MED/SURG PRIVATE ROOM

## 2024-04-10 PROCEDURE — 83735 ASSAY OF MAGNESIUM: CPT | Performed by: NURSE PRACTITIONER

## 2024-04-10 PROCEDURE — 85025 COMPLETE CBC W/AUTO DIFF WBC: CPT | Performed by: NURSE PRACTITIONER

## 2024-04-10 RX ORDER — POLYETHYLENE GLYCOL 3350 17 G/17G
17 POWDER, FOR SOLUTION ORAL DAILY
Status: DISCONTINUED | OUTPATIENT
Start: 2024-04-10 | End: 2024-04-15

## 2024-04-10 RX ORDER — DOCUSATE SODIUM 50 MG/5ML
100 LIQUID ORAL DAILY
Status: DISCONTINUED | OUTPATIENT
Start: 2024-04-10 | End: 2024-04-15

## 2024-04-10 RX ADMIN — BACITRACIN: 500 OINTMENT TOPICAL at 03:04

## 2024-04-10 RX ADMIN — SODIUM CHLORIDE SOLN NEBU 3% 4 ML: 3 NEBU SOLN at 08:04

## 2024-04-10 RX ADMIN — METHOCARBAMOL 500 MG: 500 TABLET ORAL at 08:04

## 2024-04-10 RX ADMIN — Medication 2 TABLET: at 08:04

## 2024-04-10 RX ADMIN — PROPRANOLOL HYDROCHLORIDE 20 MG: 20 TABLET ORAL at 08:04

## 2024-04-10 RX ADMIN — DOCUSATE SODIUM LIQUID 100 MG: 100 LIQUID ORAL at 08:04

## 2024-04-10 RX ADMIN — SODIUM CHLORIDE SOLN NEBU 3% 4 ML: 3 NEBU SOLN at 07:04

## 2024-04-10 RX ADMIN — BACITRACIN: 500 OINTMENT TOPICAL at 08:04

## 2024-04-10 RX ADMIN — GUAIFENESIN 200 MG: 200 SOLUTION ORAL at 05:04

## 2024-04-10 RX ADMIN — ENOXAPARIN SODIUM 40 MG: 40 INJECTION SUBCUTANEOUS at 08:04

## 2024-04-10 RX ADMIN — IPRATROPIUM BROMIDE AND ALBUTEROL SULFATE 3 ML: 2.5; .5 SOLUTION RESPIRATORY (INHALATION) at 08:04

## 2024-04-10 RX ADMIN — CEFEPIME 2 G: 2 INJECTION, POWDER, FOR SOLUTION INTRAVENOUS at 01:04

## 2024-04-10 RX ADMIN — IPRATROPIUM BROMIDE AND ALBUTEROL SULFATE 3 ML: 2.5; .5 SOLUTION RESPIRATORY (INHALATION) at 01:04

## 2024-04-10 RX ADMIN — LIDOCAINE 5% 2 PATCH: 700 PATCH TOPICAL at 11:04

## 2024-04-10 RX ADMIN — BARIUM SULFATE 10 ML: 0.81 POWDER, FOR SUSPENSION ORAL at 11:04

## 2024-04-10 RX ADMIN — GUAIFENESIN 200 MG: 200 SOLUTION ORAL at 01:04

## 2024-04-10 RX ADMIN — GUAIFENESIN 200 MG: 200 SOLUTION ORAL at 11:04

## 2024-04-10 RX ADMIN — INSULIN ASPART 2 UNITS: 100 INJECTION, SOLUTION INTRAVENOUS; SUBCUTANEOUS at 06:04

## 2024-04-10 RX ADMIN — DOXAZOSIN 1 MG: 1 TABLET ORAL at 08:04

## 2024-04-10 RX ADMIN — POLYETHYLENE GLYCOL 3350 17 G: 17 POWDER, FOR SOLUTION ORAL at 08:04

## 2024-04-10 RX ADMIN — IPRATROPIUM BROMIDE AND ALBUTEROL SULFATE 3 ML: 2.5; .5 SOLUTION RESPIRATORY (INHALATION) at 12:04

## 2024-04-10 RX ADMIN — QUETIAPINE FUMARATE 25 MG: 25 TABLET ORAL at 08:04

## 2024-04-10 RX ADMIN — METHOCARBAMOL 500 MG: 500 TABLET ORAL at 02:04

## 2024-04-10 RX ADMIN — PROPRANOLOL HYDROCHLORIDE 20 MG: 20 TABLET ORAL at 02:04

## 2024-04-10 RX ADMIN — SODIUM CHLORIDE SOLN NEBU 3% 4 ML: 3 NEBU SOLN at 01:04

## 2024-04-10 RX ADMIN — THERA TABS 1 TABLET: TAB at 08:04

## 2024-04-10 RX ADMIN — MODAFINIL 200 MG: 100 TABLET ORAL at 08:04

## 2024-04-10 RX ADMIN — IPRATROPIUM BROMIDE AND ALBUTEROL SULFATE 3 ML: 2.5; .5 SOLUTION RESPIRATORY (INHALATION) at 07:04

## 2024-04-10 NOTE — PLAN OF CARE
Pt will rec.last does of antbx., his N/G tube is removed.  Pt mittens remain on. As soon as they are off, will begin placement. Poss. Of nursing home nsg home.

## 2024-04-10 NOTE — PLAN OF CARE
Problem: SLP  Goal: SLP Goal  Description: LTG: Pt will tolerate least restrictive PO diet with no clinical signs/sx - progressing    ST. Pt will tolerate ice chips with no signs/sx of aspiration - continue  2. Pt will tolerate puree solids with no signs/sx of aspiration - progressing  3. Tolerate thermal stimulation to the anterior faucial pillars x10 with 100% effortful swallow responses and delay less than 2 seconds - continue  Outcome: Ongoing, Progressing

## 2024-04-10 NOTE — PT/OT/SLP PROGRESS
Occupational Therapy   Treatment    Name: Anayeli Taylor  MRN: [id number]  Admitting Diagnosis:  Traumatic subdural hematoma (SDH)  14 Days Post-Op    Recommendations:     Recommended therapy intensity at discharge: Moderate Intensity Therapy   Discharge Equipment Recommendations:   (tbd)  Barriers to discharge:  Decreased caregiver support (ongoing medical needs, severity of deficits)    Assessment:     Anayeli Taylor is a 73 y.o. male with a medical diagnosis of Traumatic subdural hematoma (SDH).  He presents with no change in functional status, still actively resists all EOB/OOB mobility and activity . Performance deficits affecting function are weakness, impaired self care skills, impaired endurance, impaired functional mobility, impaired balance, impaired cognition, decreased safety awareness, decreased upper extremity function.     Rehab Prognosis:  poor; patient would benefit from acute skilled OT services to address these deficits and reach maximum level of function.       Plan:     Patient to be seen 3 x/week to address the above listed problems via self-care/home management, therapeutic activities, therapeutic exercises  Plan of Care Expires: 04/30/24  Plan of Care Reviewed with: patient    Subjective     Pain/Comfort:  Pain Rating 1: 0/10    Objective:     Communicated with: nsg and Pt prior to session.  Patient found supine with peripheral IV, NG tube, SCD, telemetry, pulse ox (continuous), PureWick upon OT entry to room.    General Precautions: Standard, aspiration    Orthopedic Precautions:RUE non weight bearing  Braces: UE Sling  Respiratory Status:   Vital Signs:      Occupational Performance:     Bed Mobility:    Sup to sit with total assist x 2, pt has strength to assist with task but actively resists     Functional Mobility/Transfers:  U/a to stand 2/2 poor safety and actively resisting, extreme posterior lean but good core strength noted as pt is able to perform hollow hold unsupported for significant  length of time   Functional Mobility: multiple attempts to practice self righting/wt. Shifts/ mobility at EOB, pt resists all activity and u/a to process/plan activities     Activities of Daily Living:  Total assist socks   Wiping face max assist supine    Therapeutic Activities:  As above     Therapeutic Exercise:      Patient Education:  Patient provided with verbal education education regarding OT role/goals/POC, fall prevention, and safety awareness.  Additional teaching is warranted.      Patient left left sidelying with all lines intact, call button in reach, and bed alarm on.    GOALS:   Multidisciplinary Problems       Occupational Therapy Goals          Problem: Occupational Therapy    Goal Priority Disciplines Outcome Interventions   Occupational Therapy Goal     OT, PT/OT Ongoing, Progressing    Description: LTG: Pt will perform basic ADLs and ADL t/fs with min A using LRAD by d/c.    STG: to be met in two weeks  1. Pt will follow >80% of simple one step commands  2. Pt will perform grooming EOB with min A.   3. Pt will perform sit<>stand with mod A in prep for ADL t/fs.  4. Pt will perform functional mobility to/from toilet with min A using LRAD.                         Time Tracking:     OT Date of Treatment: 04/10/24  OT Start Time: 0939  OT Stop Time: 1002  OT Total Time (min): 23 min    Billable Minutes:neuromuscular re-ed 23 min    OT/LAUREEN: OT     Number of LAUREEN visits since last OT visit: 1    4/10/2024

## 2024-04-10 NOTE — PLAN OF CARE
Problem: Physical Therapy  Goal: Physical Therapy Goal  Description: Goals to be met by: 5/10/24     Patient will increase functional independence with mobility by performin. Supine to sit with MInimal Assistance  2. Sit to supine with MInimal Assistance  3. Rolling to Left and Right with Minimal Assistance.  4. Sit to stand transfer with Minimal Assistance  5. Bed to chair transfer with Minimal Assistance using LRAD  6. Sitting at edge of bed x10 minutes with Stand-by Assistance    4/10/2024 1455 by Melida Whitney, PT  Revised

## 2024-04-10 NOTE — PROGRESS NOTES
"   Trauma Surgery   Progress Note  Admit Date: 3/26/2024  HD#15  POD#14 Days Post-Op    Subjective:   Interval history:  Afebrile. No tachycardia overnight. O2 sat 96% on RA this AM. Patient much more alert and interactive on rounds this morning. Reports good nights rest.     Home Meds:   Current Outpatient Medications   Medication Instructions    HYDROcodone-acetaminophen (NORCO) 7.5-325 mg per tablet 1 tablet, Oral, Every 6 hours PRN    olmesartan-hydrochlorothiazide (BENICAR HCT) 20-12.5 mg per tablet 1 tablet, Oral, Daily    sildenafiL (VIAGRA) 50 mg, Oral, Daily PRN      Scheduled Meds:   albuterol-ipratropium  3 mL Nebulization Q6H    bacitracin   Topical (Top) TID    calcium-vitamin D3  2 tablet Oral Daily    docusate  100 mg Per NG tube BID    doxazosin  1 mg Per NG tube Daily    enoxparin  40 mg Subcutaneous Q12H (prophylaxis, 0900/2100)    guaiFENesin 100 mg/5 ml  200 mg Per NG tube Q6H    LIDOcaine  2 patch Transdermal Q24H    methocarbamoL  500 mg Per NG tube TID    modafiniL  200 mg Per NG tube Daily    multivitamin  1 tablet Per NG tube Daily    polyethylene glycol  17 g Per NG tube BID    propranoloL  20 mg Per NG tube TID    QUEtiapine  25 mg Per NG tube QHS    senna  8.6 mg Per NG tube Daily    sodium chloride 3%  4 mL Nebulization Q6H     Continuous Infusions:  PRN Meds:bisacodyL, dextrose 10%, dextrose 10%, glucagon (human recombinant), hydrALAZINE, insulin aspart U-100, labetaloL, LORazepam, melatonin, ondansetron, oxyCODONE     Objective:     VITAL SIGNS: 24 HR MIN & MAX LAST   Temp  Min: 98.2 °F (36.8 °C)  Max: 99.2 °F (37.3 °C)  98.6 °F (37 °C)   BP  Min: 111/62  Max: 148/72  (!) 148/72    Pulse  Min: 78  Max: 126  87    Resp  Min: 17  Max: 30  18    SpO2  Min: 95 %  Max: 99 %  96 %      HT: 5' 5.98" (167.6 cm)  WT: 55.2 kg (121 lb 11.1 oz)  BMI: 19.7     Intake/output:  Intake/Output - Last 3 Shifts         04/08 0700  04/09 0659 04/09 0700  04/10 0659    NG/GT 75 75    Total Intake(mL/kg) 75 " (1.4) 75 (1.4)    Urine (mL/kg/hr) 650 (0.5) 1850 (1.4)    Stool  0    Total Output 650 1850    Net -575 -1775          Stool Occurrence 1 x 4 x            Intake/Output Summary (Last 24 hours) at 4/10/2024 0636  Last data filed at 4/10/2024 0531  Gross per 24 hour   Intake 75 ml   Output 1850 ml   Net -1775 ml           Lines/drains/airway:       Peripheral IV - Single Lumen 04/08/24 2245 20 G Left;Posterior Forearm (Active)   Site Assessment Clean;Dry;Intact 04/08/24 2000   Extremity Assessment Distal to IV No abnormal discoloration 04/08/24 2000   Line Status Capped 04/08/24 2000   Dressing Status Clean;Dry;Intact 04/08/24 2000   Dressing Intervention Integrity maintained 04/08/24 2000   Number of days: 0            NG/OG Tube 03/30/24 0300 16 Fr. Right nostril (Active)   Placement Check placement verified by distal tube length measurement 04/06/24 0400   Distal Tube Length (cm) 60 04/07/24 0800   Tolerance no signs/symptoms of discomfort 04/08/24 2000   Securement secured to nostril center 04/08/24 2000   Clamp Status/Tolerance unclamped;no abdominal discomfort 04/08/24 2000   Suction Setting/Drainage Method suction at the bedside 04/08/24 2000   Insertion Site Appearance no redness, warmth, tenderness, skin breakdown, drainage 04/08/24 2000   Drainage None 04/08/24 2000   Flush/Irrigation flushed w/;water 04/08/24 2000   Feeding Type continuous;by pump 04/08/24 2000   Feeding Action feeding continued 04/08/24 2000   Current Rate (mL/hr) 55 mL/hr 04/08/24 2000   Goal Rate (mL/hr) 55 mL/hr 04/08/24 2000   Intake (mL) 474 mL 04/06/24 0525   Water Bolus (mL) 75 mL 04/08/24 2000   Rate Formula Tube Feeding (mL/hr) 55 mL/hr 04/08/24 2000   Formula Name impact peptide 1.5 04/08/24 2000   Intake (mL) - Formula Tube Feeding 535 04/03/24 0559   Number of days: 10       Female External Urinary Catheter w/ Suction 04/07/24 0800 (Active)   Number of days: 1       Physical examination:  Gen: NAD, AAOx2, answering questions  "appropriately  HEENT: Normocephalic, scalp incision C/D/I. Facial contusions healing. NGT in place with TFs infusing.  CV: NSR, HR 90s  Resp: NWOB, R clavicle deformity  Abd: S/NT/ND, last BM 4/9 x 4  Msk: moving all extremities spontaneously and purposefully  Neuro: Oriented to self and place, follows commands    Skin/wounds: Warm, dry    Labs:  Renal:  Recent Labs     04/08/24 0345 04/09/24  0452 04/10/24  0433   BUN 21.9 23.3 23.7   CREATININE 0.57* 0.70* 0.63*       No results for input(s): "LACTIC" in the last 72 hours.  FEN/GI:  Recent Labs     04/08/24  0345 04/09/24  0452 04/10/24  0433    136 137   K 4.5 4.5 4.6   CO2 24 26 25   CALCIUM 8.0* 7.8* 8.2*   MG 2.30 2.40 2.30   PHOS 2.5 2.5 2.8   ALBUMIN 2.0* 2.1* 2.2*   BILITOT 0.5 0.5 0.5   * 105* 77*   ALKPHOS 212* 238* 271*   * 154* 140*       Heme:  Recent Labs     04/08/24 0345 04/09/24  0338 04/10/24  0433   HGB 7.8* 8.1* 8.7*   HCT 24.6* 25.6* 27.1*   * 676* 682*       ID:  Recent Labs     04/08/24  0345 04/09/24  0338 04/10/24  0433   WBC 9.73 9.81 9.18       CBG:  Recent Labs     04/08/24  0345 04/09/24  0452 04/10/24  0433   GLUCOSE 160* 154* 146*        Recent Labs     04/08/24  0941 04/08/24  1122 04/08/24  1807 04/08/24 2002 04/09/24  0505 04/09/24  1141 04/09/24  1731 04/09/24 1957   POCTGLUCOSE 178* 163* 149* 163* 154* 181* 186* 152*        Cardiovascular:  Recent Labs   Lab 04/03/24  0711 04/03/24  1645 04/03/24  2209   TROPONINI 0.200* <0.010 <0.010       I have reviewed all pertinent lab results within the past 24 hours.    Imaging:  X-Ray Chest 1 View   Final Result      No significant change         Electronically signed by: Francis Ortiz   Date:    04/09/2024   Time:    07:23      X-Ray Chest 1 View   Final Result      No definitive pneumothorax.         Electronically signed by: Chuy Quintero   Date:    04/08/2024   Time:    19:57      X-Ray Chest 1 View   Final Result      Possible very small right " superior pneumothorax.         Electronically signed by: Chuy Quintero   Date:    04/08/2024   Time:    16:15      X-Ray Chest 1 View   Final Result      No significant change         Electronically signed by: Francis Ortiz   Date:    04/08/2024   Time:    07:37      X-Ray Chest 1 View   Final Result      Decrease in right pleural fluid collection.         Electronically signed by: Otilio Prieto MD   Date:    04/07/2024   Time:    09:42      X-Ray Chest 1 View   Final Result      No significant detrimental change.  Stable findings as above.         Electronically signed by: Damian Martinez   Date:    04/06/2024   Time:    09:51      CT Chest With Contrast   Final Result      Interval development of patchy interstitial infiltrates in the left upper lobe with atelectasis seen in the left lower lobe      Large right-sided pleural effusion with extensive atelectasis in the right lower lobe and right middle lobe.      Interval resolution of the right-sided pneumothorax      Multiple fractures seen stable since prior examination         Electronically signed by: Bc Bellamy   Date:    04/05/2024   Time:    10:39      X-Ray Chest 1 View   Final Result      Interval development of left-sided pleural effusion.      Interval development of confluent airspace opacities in the left perihilar region.      Worsening of confluent airspace opacities in the right upper lobe.      No other change         Electronically signed by: Francis Ortiz   Date:    04/05/2024   Time:    08:59      X-Ray Chest 1 View   Final Result      Fl Modified Barium Swallow Speech   Final Result      X-Ray Chest 1 View   Final Result      Slightly more blunting of the right costophrenic angle and more haziness at the right base.      No other interval change         Electronically signed by: Francis Ortiz   Date:    04/03/2024   Time:    05:42      X-Ray Chest 1 View   Final Result      No significant change         Electronically signed  by: Francis Ortiz   Date:    04/02/2024   Time:    07:46      XR Gastric tube check, non-radiologist performed   Final Result      As above.         Electronically signed by: Cisco Rankin   Date:    04/01/2024   Time:    12:03      X-Ray Chest 1 View   Final Result      As above.         Electronically signed by: Jose M Kaur   Date:    04/01/2024   Time:    07:48      X-Ray Chest 1 View   Final Result      Possible small right apical pneumothorax, unchanged from previous.      Lung volumes are low with associated atelectatic change.         Electronically signed by: Leila Salgado   Date:    03/31/2024   Time:    12:19      X-Ray Clavicle Right   Final Result      Degenerative arthritic changes.         Electronically signed by: Luis Anglin   Date:    03/30/2024   Time:    15:19      X-Ray Chest 1 View   Final Result      No significant interval change         Electronically signed by: Luis Anglin   Date:    03/30/2024   Time:    07:49      X-Ray Chest 1 View   Final Result      As above.         Electronically signed by: Jose M Kaur   Date:    03/29/2024   Time:    09:09      XR Gastric tube check, non-radiologist performed   Final Result      Enteric tube extends well into the stomach.         Electronically signed by: Cisco Rankin   Date:    03/28/2024   Time:    12:23      X-Ray Chest 1 View   Final Result      Lung volumes are low with associated atelectatic change.         Electronically signed by: Leila Salgado   Date:    03/28/2024   Time:    06:20      CT Head Without Contrast   Final Result      Postoperative changes following right-sided craniotomy for evacuation of subdural hemorrhage with decreased mass effect.         Electronically signed by: Joyce Calderon   Date:    03/27/2024   Time:    12:20      X-Ray Chest 1 View   Final Result      As above.         Electronically signed by: Jose M Kaur   Date:    03/27/2024   Time:    07:27      CT 3D RECON WITHOUT INDEPENDENT WS   Final  Result   FINDINGS/   3D reconstructions of the thorax were created based on source data from accession number [id number].  Please see that report for details.         Electronically signed by: Cisco Rankin   Date:    03/27/2024   Time:    07:49      CTA Head and Neck (xpd)   Final Result      1. No large vessel occlusion or flow-limiting stenosis.   2. Suspected hemorrhage along the right anterior temporal convexity.   3. Otherwise no evidence of acute arterial injury.   No major change from the [facility] dictation.         Electronically signed by: Joyce Calderon   Date:    03/27/2024   Time:    12:43      CT Head Without Contrast   Final Result      Significant increase in size of right-sided subdural hemorrhage and bilateral subarachnoid hemorrhage, with 1.5 cm of leftward midline shift.      No significant change from the [facility] interpretation.         Electronically signed by: Joyce Calderon   Date:    03/27/2024   Time:    12:19      X-Ray Chest 1 View   Final Result      As above.         Electronically signed by: Jose M Kaur   Date:    03/27/2024   Time:    07:28      X-ray Shoulder 2 or More Views Right   Final Result      Nondisplaced fracture of the acromion      Right rib fractures      Known clavicular head fracture better demonstrated on previous CT.         Electronically signed by: Leila Salgado   Date:    03/27/2024   Time:    11:25      X-Ray Elbow 2 Views Right   Final Result      No acute osseous abnormality.         Electronically signed by: Leila Salgado   Date:    03/27/2024   Time:    11:22      X-Ray Elbow 2 Views Left   Final Result      No acute osseous abnormality taking into account suboptimal positioning.  Unable to adequately assess the radial head/neck.         Electronically signed by: Leila Salgado   Date:    03/27/2024   Time:    11:20      CTA Chest Aorta Non Coronary   Final Result      Multiple right rib fractures with small right pneumothorax.  Areas of  contusion laterally in the right lung near the rib fractures.      Fractures of the right clavicle and right acromion.      No significant discrepancy between my interpretation and the preliminary radiology report.         Electronically signed by: Chuy Quintero   Date:    03/27/2024   Time:    08:27      CT Abdomen Pelvis With IV Contrast NO Oral Contrast   Final Result      Right adrenal hemorrhage with approximately 5 cm hematoma.      Right L1 through L3 transverse process fractures.      No significant discrepancy between my interpretation and the preliminary radiology report.         Electronically signed by: Chuy Quintero   Date:    03/27/2024   Time:    08:02      CT Cervical Spine Without Contrast   Final Result      No acute cervical spine fracture or traumatic malalignment identified.      No significant discrepancy between my interpretation and the preliminary radiology report.         Electronically signed by: Chuy Quintero   Date:    03/27/2024   Time:    08:47      CT Maxillofacial Without Contrast   Final Result      Right periorbital soft tissue swelling/contusion.  Small volume left sphenoid sinus fluid may be hemosinus.      No significant discrepancy between my interpretation and the preliminary radiology report.         Electronically signed by: Chuy Quintero   Date:    03/27/2024   Time:    08:56      CT Head Without Contrast   Final Result      Bilateral subarachnoid hemorrhage with right-sided subdural hematoma and trace intraventricular blood.      No major discrepancy with the preliminary radiology report.         Electronically signed by: Chuy Quintero   Date:    03/27/2024   Time:    08:35      X-Ray Pelvis Routine AP   Final Result      No acute findings.         Electronically signed by: Cisco Rankin   Date:    03/27/2024   Time:    07:48      X-Ray Chest 1 View   Final Result      There is a small right pneumothorax better demonstrated on CT. Right rib fractures also better  demonstrated on CT.         Electronically signed by: Cisco Rankin   Date:    03/27/2024   Time:    07:47         I have reviewed all pertinent imaging results/findings within the past 24 hours.    Micro/Path/Other:  Microbiology Results (last 7 days)       ** No results found for the last 168 hours. **           Pathology Results  (Last 7 days)      None             Problems list:  Active Problem List with Overview Notes    Diagnosis Date Noted    Hemopneumothorax on right 04/02/2024    Contusion of right lung 04/02/2024    Adrenal hemorrhage 04/02/2024    Scalp laceration 04/02/2024    Elbow laceration, left, initial encounter 04/02/2024    Acute blood loss anemia 03/29/2024    Acute hypoxemic respiratory failure 03/27/2024    Traumatic subdural hematoma (SDH) 03/27/2024    Aspiration pneumonia due to gastric secretions 03/27/2024    Closed fracture of multiple ribs 03/27/2024    Closed nondisplaced fracture of sternal end of right clavicle 03/27/2024        Assessment & Plan:     SDH, SAH  - s/p emergent craniotomy 3/27/24  - Serial neuro checks q4h  - Keep HOB > 30 degrees  - Keppra completed 4/2/24  - Promote good sleep-wake cycles  - Provigil qAM  - Seroquel qHS    R 3-9 rib fractures, R PTX/FLIP, R pulmonary contusions  - s/p acute respiratory failure s/t trauma, resolved - extubated 3/29/24  - CXR with no PTX post removal of pleural drain  - BiPAP at night prn  - Cefepmine x 7 days, end date 4/10/24  - Encourage frequent IS  - Good pulmonary hygiene  - Nebs q6h    R clavicle fracture  - Evaluated by Orthopedics, non-op management  - Sling for comfort  - OK for ADLs, no overhead activity  - Pendulum ROM shoulder, full ROM distally    Dysphagia  - Continue working with ST  - TF until able to tolerate po diet  - Patient unable to participate in MBSS 4/9 s/t fatigue   - Possible PEG placement if unable to advance diet    Auto vs Scooter  - Daily labs  - MMPC  - PT/OT  - Multiple BMs yesterday, decreased bowel  regimen to daily  - SCDs & Lovenox 40 mg q12h for VTE ppx  - CM following for neuro rehab placement    Jeanette Ramos, AGACNP-BC, FNP-BC  Trauma Surgery  [facility]  C: [phone]

## 2024-04-10 NOTE — PT/OT/SLP RE-EVAL
Physical Therapy Re-Evaluation    Patient Name:  Anayeli Taylor   MRN:  [id number]    Recommendations:     Discharge therapy intensity: Moderate Intensity Therapy   Discharge Equipment Recommendations:  (TBD)   Barriers to discharge: Impaired mobility and Ongoing medical needs    Assessment:     Anayeli Taylor is a 73 y.o. male admitted with a medical diagnosis of  SDH s/p crani; acute respiratory failure requiring intubation now extubated, R pneumothorax, rib fxs, R medial clavicle and acromion fx (non-op), right adrenal hematoma. .  He presents with the following impairments/functional limitations: weakness, impaired endurance, impaired self care skills, impaired functional mobility, gait instability, impaired balance, decreased lower extremity function, decreased upper extremity function, decreased safety awareness, decreased coordination. Patient not making progress with PT. Total A for all mobility. Not following any commands. Resisting with strong posterior lean when attempting to sit or stand. Dropping POC to 2x/week at this time. Will update frequency as needed. Recommending MOD intensity therapy at discharge.      Rehab Prognosis: Poor; patient would benefit from acute skilled PT services to address these deficits and reach maximum level of function.    Recent Surgery: Procedure(s) (LRB):  CRANIOTOMY, FOR SUBDURAL HEMATOMA EVACUATION-RIGHT (Right)  Insertion,central venous access device (Right) 14 Days Post-Op    Plan:     During this hospitalization, patient to be seen 2 x/week to address the identified rehab impairments via therapeutic exercises, therapeutic activities, gait training, neuromuscular re-education and progress toward the following goals:    Plan of Care Expires:  04/30/24    PT/PTA conference to discuss PT POC and patient's progression towards goals held with Andressa Viveros PTA.     Subjective     Chief Complaint: none  Patient/Family Comments/goals: none  Pain/Comfort  Pain Rating 1:  0/10    Patients cultural, spiritual, Zoroastrian conflicts given the current situation: no    Objective:     Communicated with nurse prior to session.  Patient found supine with peripheral IV, NG tube, SCD, telemetry, pulse ox (continuous), PureWick  upon PT entry to room.    General Precautions: Standard, aspiration, fall  Orthopedic Precautions:RUE non weight bearing   Braces: UE Sling  Respiratory Status: Room air    Exams:  Cognitive Exam:  Patient is oriented to Person  Sensation: -       Intact  RLE ROM: Unable to assess as patient resisting   RLE Strength: Unable to accurately assess as patient confused  LLE ROM: Unable to assess as patient resisting   LLE Strength: Unable to accurately assess as patient confused  Skin integrity: Visible skin intact      Functional Mobility:  Bed Mobility:  Rolling Left:  total assistance  Rolling Right: total assistance  Supine to Sit: total assistance  Sit to Supine: total assistance  Transfers:  Sit to Stand:  total assistance with rolling walker. Strong posterior lean  Sitting Balance: poor. Strong posterior lean      AM-PAC 6 CLICK MOBILITY  Total Score:9     Education Provided:  Role and goals of PT, transfer training, bed mobility, gait training, balance training, safety awareness, assistive device, strengthening exercises, and importance of participating in PT to return to PLOF.    Patient left HOB elevated with all lines intact and call button in reach.    GOALS:   Multidisciplinary Problems       Physical Therapy Goals          Problem: Physical Therapy    Goal Priority Disciplines Outcome Goal Variances Interventions   Physical Therapy Goal     PT, PT/OT Ongoing, Progressing     Description: Goals to be met by: 5/10/24     Patient will increase functional independence with mobility by performin. Supine to sit with MInimal Assistance  2. Sit to supine with MInimal Assistance  3. Rolling to Left and Right with Minimal Assistance.  4. Sit to stand transfer with  Minimal Assistance  5. Bed to chair transfer with Minimal Assistance using LRAD  6. Sitting at edge of bed x10 minutes with Stand-by Assistance                         History:     History reviewed. No pertinent past medical history.    Past Surgical History:   Procedure Laterality Date    CRANIOTOMY FOR EVACUATION OF SUBDURAL HEMATOMA Right 3/27/2024    Procedure: CRANIOTOMY, FOR SUBDURAL HEMATOMA EVACUATION-RIGHT;  Surgeon: Davonte Resendiz MD;  Location: [facility] OR;  Service: Neurosurgery;  Laterality: Right;    INSERTION, CENTRAL VENOUS ACCESS DEVICE Right 3/27/2024    Procedure: Insertion,central venous access device;  Surgeon: Davonte Resendiz MD;  Location: [facility];  Service: Neurosurgery;  Laterality: Right;  per Dr. Woody- start at 0405       Time Tracking:     PT Received On: 04/10/24  PT Start Time: 0935     PT Stop Time: 1000  PT Total Time (min): 25 min     Billable Minutes: Re-eval 25 minutes      04/10/2024
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
## 2024-04-11 LAB
ALBUMIN SERPL-MCNC: 2.7 G/DL (ref 3.4–4.8)
ALBUMIN/GLOB SERPL: 0.6 RATIO (ref 1.1–2)
ALP SERPL-CCNC: 329 UNIT/L (ref 40–150)
ALT SERPL-CCNC: 129 UNIT/L (ref 0–55)
AST SERPL-CCNC: 55 UNIT/L (ref 5–34)
BASOPHILS # BLD AUTO: 0.05 X10(3)/MCL
BASOPHILS NFR BLD AUTO: 0.6 %
BILIRUB SERPL-MCNC: 0.7 MG/DL
BUN SERPL-MCNC: 18.2 MG/DL (ref 8.4–25.7)
CALCIUM SERPL-MCNC: 8.8 MG/DL (ref 8.8–10)
CHLORIDE SERPL-SCNC: 103 MMOL/L (ref 98–107)
CO2 SERPL-SCNC: 25 MMOL/L (ref 23–31)
CREAT SERPL-MCNC: 0.71 MG/DL (ref 0.73–1.18)
CRP SERPL-MCNC: 116.8 MG/L
EOSINOPHIL # BLD AUTO: 0.14 X10(3)/MCL (ref 0–0.9)
EOSINOPHIL NFR BLD AUTO: 1.8 %
ERYTHROCYTE [DISTWIDTH] IN BLOOD BY AUTOMATED COUNT: 15.6 % (ref 11.5–17)
GFR SERPLBLD CREATININE-BSD FMLA CKD-EPI: >60 MLS/MIN/1.73/M2
GLOBULIN SER-MCNC: 4.4 GM/DL (ref 2.4–3.5)
GLUCOSE SERPL-MCNC: 119 MG/DL (ref 82–115)
HCT VFR BLD AUTO: 32.5 % (ref 42–52)
HGB BLD-MCNC: 10.2 G/DL (ref 14–18)
IMM GRANULOCYTES # BLD AUTO: 0.07 X10(3)/MCL (ref 0–0.04)
IMM GRANULOCYTES NFR BLD AUTO: 0.9 %
LYMPHOCYTES # BLD AUTO: 1.49 X10(3)/MCL (ref 0.6–4.6)
LYMPHOCYTES NFR BLD AUTO: 18.8 %
MAGNESIUM SERPL-MCNC: 2.6 MG/DL (ref 1.6–2.6)
MCH RBC QN AUTO: 29.5 PG (ref 27–31)
MCHC RBC AUTO-ENTMCNC: 31.4 G/DL (ref 33–36)
MCV RBC AUTO: 93.9 FL (ref 80–94)
MONOCYTES # BLD AUTO: 0.63 X10(3)/MCL (ref 0.1–1.3)
MONOCYTES NFR BLD AUTO: 8 %
NEUTROPHILS # BLD AUTO: 5.53 X10(3)/MCL (ref 2.1–9.2)
NEUTROPHILS NFR BLD AUTO: 69.9 %
NRBC BLD AUTO-RTO: 0 %
PHOSPHATE SERPL-MCNC: 2.9 MG/DL (ref 2.3–4.7)
PLATELET # BLD AUTO: 743 X10(3)/MCL (ref 130–400)
PMV BLD AUTO: 8.8 FL (ref 7.4–10.4)
POCT GLUCOSE: 115 MG/DL (ref 70–110)
POCT GLUCOSE: 123 MG/DL (ref 70–110)
POCT GLUCOSE: 125 MG/DL (ref 70–110)
POCT GLUCOSE: 136 MG/DL (ref 70–110)
POTASSIUM SERPL-SCNC: 4.9 MMOL/L (ref 3.5–5.1)
PREALB SERPL-MCNC: 18.9 MG/DL (ref 16–42)
PROT SERPL-MCNC: 7.1 GM/DL (ref 5.8–7.6)
RBC # BLD AUTO: 3.46 X10(6)/MCL (ref 4.7–6.1)
SODIUM SERPL-SCNC: 138 MMOL/L (ref 136–145)
WBC # SPEC AUTO: 7.91 X10(3)/MCL (ref 4.5–11.5)

## 2024-04-11 PROCEDURE — 84100 ASSAY OF PHOSPHORUS: CPT | Performed by: NURSE PRACTITIONER

## 2024-04-11 PROCEDURE — 27000190 HC CPAP FULL FACE MASK W/VALVE

## 2024-04-11 PROCEDURE — 99900031 HC PATIENT EDUCATION (STAT)

## 2024-04-11 PROCEDURE — 25000003 PHARM REV CODE 250: Performed by: NURSE PRACTITIONER

## 2024-04-11 PROCEDURE — 27100171 HC OXYGEN HIGH FLOW UP TO 24 HOURS

## 2024-04-11 PROCEDURE — 80053 COMPREHEN METABOLIC PANEL: CPT | Performed by: NURSE PRACTITIONER

## 2024-04-11 PROCEDURE — 21400001 HC TELEMETRY ROOM

## 2024-04-11 PROCEDURE — 94640 AIRWAY INHALATION TREATMENT: CPT

## 2024-04-11 PROCEDURE — 92523 SPEECH SOUND LANG COMPREHEN: CPT

## 2024-04-11 PROCEDURE — 94668 MNPJ CHEST WALL SBSQ: CPT

## 2024-04-11 PROCEDURE — 84134 ASSAY OF PREALBUMIN: CPT | Performed by: NURSE PRACTITIONER

## 2024-04-11 PROCEDURE — 25000242 PHARM REV CODE 250 ALT 637 W/ HCPCS: Performed by: NURSE PRACTITIONER

## 2024-04-11 PROCEDURE — 86140 C-REACTIVE PROTEIN: CPT | Performed by: NURSE PRACTITIONER

## 2024-04-11 PROCEDURE — 25000003 PHARM REV CODE 250: Performed by: SURGERY

## 2024-04-11 PROCEDURE — 25000003 PHARM REV CODE 250

## 2024-04-11 PROCEDURE — 94660 CPAP INITIATION&MGMT: CPT

## 2024-04-11 PROCEDURE — 63600175 PHARM REV CODE 636 W HCPCS: Performed by: NURSE PRACTITIONER

## 2024-04-11 PROCEDURE — 83735 ASSAY OF MAGNESIUM: CPT | Performed by: NURSE PRACTITIONER

## 2024-04-11 PROCEDURE — 11000001 HC ACUTE MED/SURG PRIVATE ROOM

## 2024-04-11 PROCEDURE — 85025 COMPLETE CBC W/AUTO DIFF WBC: CPT | Performed by: NURSE PRACTITIONER

## 2024-04-11 PROCEDURE — 99900035 HC TECH TIME PER 15 MIN (STAT)

## 2024-04-11 PROCEDURE — 94760 N-INVAS EAR/PLS OXIMETRY 1: CPT

## 2024-04-11 RX ADMIN — DOXAZOSIN 1 MG: 1 TABLET ORAL at 08:04

## 2024-04-11 RX ADMIN — GUAIFENESIN 200 MG: 200 SOLUTION ORAL at 05:04

## 2024-04-11 RX ADMIN — SODIUM CHLORIDE SOLN NEBU 3% 4 ML: 3 NEBU SOLN at 07:04

## 2024-04-11 RX ADMIN — ENOXAPARIN SODIUM 40 MG: 40 INJECTION SUBCUTANEOUS at 08:04

## 2024-04-11 RX ADMIN — METHOCARBAMOL 500 MG: 500 TABLET ORAL at 03:04

## 2024-04-11 RX ADMIN — IPRATROPIUM BROMIDE AND ALBUTEROL SULFATE 3 ML: 2.5; .5 SOLUTION RESPIRATORY (INHALATION) at 01:04

## 2024-04-11 RX ADMIN — SODIUM CHLORIDE SOLN NEBU 3% 4 ML: 3 NEBU SOLN at 01:04

## 2024-04-11 RX ADMIN — IPRATROPIUM BROMIDE AND ALBUTEROL SULFATE 3 ML: 2.5; .5 SOLUTION RESPIRATORY (INHALATION) at 07:04

## 2024-04-11 RX ADMIN — DOCUSATE SODIUM LIQUID 100 MG: 100 LIQUID ORAL at 08:04

## 2024-04-11 RX ADMIN — POLYETHYLENE GLYCOL 3350 17 G: 17 POWDER, FOR SOLUTION ORAL at 08:04

## 2024-04-11 RX ADMIN — METHOCARBAMOL 500 MG: 500 TABLET ORAL at 08:04

## 2024-04-11 RX ADMIN — Medication 2 TABLET: at 08:04

## 2024-04-11 RX ADMIN — PROPRANOLOL HYDROCHLORIDE 20 MG: 20 TABLET ORAL at 08:04

## 2024-04-11 RX ADMIN — PROPRANOLOL HYDROCHLORIDE 20 MG: 20 TABLET ORAL at 03:04

## 2024-04-11 RX ADMIN — BACITRACIN: 500 OINTMENT TOPICAL at 03:04

## 2024-04-11 RX ADMIN — SODIUM CHLORIDE SOLN NEBU 3% 4 ML: 3 NEBU SOLN at 08:04

## 2024-04-11 RX ADMIN — Medication 6 MG: at 08:04

## 2024-04-11 RX ADMIN — BACITRACIN: 500 OINTMENT TOPICAL at 08:04

## 2024-04-11 RX ADMIN — MODAFINIL 200 MG: 100 TABLET ORAL at 08:04

## 2024-04-11 RX ADMIN — THERA TABS 1 TABLET: TAB at 08:04

## 2024-04-11 RX ADMIN — QUETIAPINE FUMARATE 25 MG: 25 TABLET ORAL at 08:04

## 2024-04-11 RX ADMIN — LIDOCAINE 5% 2 PATCH: 700 PATCH TOPICAL at 11:04

## 2024-04-11 RX ADMIN — GUAIFENESIN 200 MG: 200 SOLUTION ORAL at 11:04
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
## 2024-04-11 NOTE — PLAN OF CARE
Spoke to Eunice Taylor, pt's oldest dgtr.  [phone].  Informed her of pt's progress, removal of mitts.  [facility]  [phone]  Provided them my name and number.  Will await to hear from Rehab. Pt. Has [employer],  insurance.   [facility] [phone]   [address]  [address] [place] [address] Cande Mane

## 2024-04-11 NOTE — PLAN OF CARE
Problem: SLP  Goal: SLP Goal  Description: LTG: Pt will tolerate least restrictive PO diet with no clinical signs/sx - progressing  ST. Pt will tolerate ice chips with no signs/sx of aspiration - continue  2. Pt will tolerate puree solids with no signs/sx of aspiration - progressing  3. Tolerate thermal stimulation to the anterior faucial pillars x10 with 100% effortful swallow responses and delay less than 2 seconds - continue    LTG: complete basic cognitive tasks with supervision  STGs:  1.  Oriented x4 modified independent  2.  Attend to tx tasks x5 minutes without redirection  3.  Follow 1-step directions 90%  4.  Answer simple yes/no questions 90%  Outcome: Ongoing, Progressing

## 2024-04-11 NOTE — AI DETERIORATION ALERT
Artificial Intelligence Notification  [facility]  [address]  [place] [address]  Phone: [phone]    This documentation was triggered by an Artificial Intelligence Notification:    Admit Date: 3/26/2024   LOS: 16  Code Status: Full Code  : 1950  Age: 73 y.o.  Weight:   Wt Readings from Last 1 Encounters:   24 55.2 kg (121 lb 11.1 oz)        Sex: male  Bed: [address] [place] [address] A  MRN: [id number]  Attending Physician: Eddie Luther MD     Date of Alert: 2024  Time AI Alert Received: 0820            Vitals:    24 0813   BP:    Pulse: (!) 133   Resp: (!) 24   Temp:      SpO2: 97 %      Artificial Intelligence alert discussed with Provider:     Name: Jeanette   Date/Time of Provider Notification: Icu available if needed.      Patient Condition: stable, per md notes, pt has been having tachycardia daily up to 130's

## 2024-04-12 LAB
POCT GLUCOSE: 126 MG/DL (ref 70–110)
POCT GLUCOSE: 161 MG/DL (ref 70–110)

## 2024-04-12 PROCEDURE — 25000003 PHARM REV CODE 250: Performed by: SURGERY

## 2024-04-12 PROCEDURE — 25000003 PHARM REV CODE 250: Performed by: NURSE PRACTITIONER

## 2024-04-12 PROCEDURE — 94760 N-INVAS EAR/PLS OXIMETRY 1: CPT

## 2024-04-12 PROCEDURE — 27000646 HC AEROBIKA DEVICE

## 2024-04-12 PROCEDURE — 25000242 PHARM REV CODE 250 ALT 637 W/ HCPCS: Performed by: NURSE PRACTITIONER

## 2024-04-12 PROCEDURE — 25000003 PHARM REV CODE 250

## 2024-04-12 PROCEDURE — 99900035 HC TECH TIME PER 15 MIN (STAT)

## 2024-04-12 PROCEDURE — 11000001 HC ACUTE MED/SURG PRIVATE ROOM

## 2024-04-12 PROCEDURE — 94799 UNLISTED PULMONARY SVC/PX: CPT | Mod: XB

## 2024-04-12 PROCEDURE — 21400001 HC TELEMETRY ROOM

## 2024-04-12 PROCEDURE — 63600175 PHARM REV CODE 636 W HCPCS: Performed by: NURSE PRACTITIONER

## 2024-04-12 PROCEDURE — 94640 AIRWAY INHALATION TREATMENT: CPT

## 2024-04-12 PROCEDURE — 99900031 HC PATIENT EDUCATION (STAT)

## 2024-04-12 PROCEDURE — 94664 DEMO&/EVAL PT USE INHALER: CPT

## 2024-04-12 PROCEDURE — 94761 N-INVAS EAR/PLS OXIMETRY MLT: CPT

## 2024-04-12 RX ADMIN — SODIUM CHLORIDE SOLN NEBU 3% 4 ML: 3 NEBU SOLN at 07:04

## 2024-04-12 RX ADMIN — METHOCARBAMOL 500 MG: 500 TABLET ORAL at 09:04

## 2024-04-12 RX ADMIN — MODAFINIL 200 MG: 100 TABLET ORAL at 09:04

## 2024-04-12 RX ADMIN — QUETIAPINE FUMARATE 25 MG: 25 TABLET ORAL at 08:04

## 2024-04-12 RX ADMIN — METHOCARBAMOL 500 MG: 500 TABLET ORAL at 08:04

## 2024-04-12 RX ADMIN — SODIUM CHLORIDE SOLN NEBU 3% 4 ML: 3 NEBU SOLN at 01:04

## 2024-04-12 RX ADMIN — Medication 2 TABLET: at 09:04

## 2024-04-12 RX ADMIN — POLYETHYLENE GLYCOL 3350 17 G: 17 POWDER, FOR SOLUTION ORAL at 09:04

## 2024-04-12 RX ADMIN — ENOXAPARIN SODIUM 40 MG: 40 INJECTION SUBCUTANEOUS at 09:04

## 2024-04-12 RX ADMIN — IPRATROPIUM BROMIDE AND ALBUTEROL SULFATE 3 ML: 2.5; .5 SOLUTION RESPIRATORY (INHALATION) at 07:04

## 2024-04-12 RX ADMIN — IPRATROPIUM BROMIDE AND ALBUTEROL SULFATE 3 ML: 2.5; .5 SOLUTION RESPIRATORY (INHALATION) at 01:04

## 2024-04-12 RX ADMIN — BACITRACIN: 500 OINTMENT TOPICAL at 08:04

## 2024-04-12 RX ADMIN — GUAIFENESIN 200 MG: 200 SOLUTION ORAL at 12:04

## 2024-04-12 RX ADMIN — PROPRANOLOL HYDROCHLORIDE 20 MG: 20 TABLET ORAL at 09:04

## 2024-04-12 RX ADMIN — BACITRACIN: 500 OINTMENT TOPICAL at 09:04

## 2024-04-12 RX ADMIN — THERA TABS 1 TABLET: TAB at 09:04

## 2024-04-12 RX ADMIN — PROPRANOLOL HYDROCHLORIDE 20 MG: 20 TABLET ORAL at 08:04

## 2024-04-12 RX ADMIN — LIDOCAINE 5% 2 PATCH: 700 PATCH TOPICAL at 12:04

## 2024-04-12 RX ADMIN — IPRATROPIUM BROMIDE AND ALBUTEROL SULFATE 3 ML: 2.5; .5 SOLUTION RESPIRATORY (INHALATION) at 08:04

## 2024-04-12 RX ADMIN — GUAIFENESIN 200 MG: 200 SOLUTION ORAL at 05:04

## 2024-04-12 RX ADMIN — DOXAZOSIN 1 MG: 1 TABLET ORAL at 09:04

## 2024-04-12 RX ADMIN — METHOCARBAMOL 500 MG: 500 TABLET ORAL at 03:04

## 2024-04-12 RX ADMIN — BACITRACIN: 500 OINTMENT TOPICAL at 05:04

## 2024-04-12 RX ADMIN — ENOXAPARIN SODIUM 40 MG: 40 INJECTION SUBCUTANEOUS at 08:04

## 2024-04-12 RX ADMIN — PROPRANOLOL HYDROCHLORIDE 20 MG: 20 TABLET ORAL at 03:04

## 2024-04-12 RX ADMIN — DOCUSATE SODIUM LIQUID 100 MG: 100 LIQUID ORAL at 09:04

## 2024-04-12 RX ADMIN — Medication 6 MG: at 08:04
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
## 2024-04-13 LAB
POCT GLUCOSE: 121 MG/DL (ref 70–110)
POCT GLUCOSE: 153 MG/DL (ref 70–110)

## 2024-04-13 PROCEDURE — 25000242 PHARM REV CODE 250 ALT 637 W/ HCPCS: Performed by: NURSE PRACTITIONER

## 2024-04-13 PROCEDURE — 99900031 HC PATIENT EDUCATION (STAT)

## 2024-04-13 PROCEDURE — 63600175 PHARM REV CODE 636 W HCPCS: Performed by: NURSE PRACTITIONER

## 2024-04-13 PROCEDURE — 25000003 PHARM REV CODE 250: Performed by: NURSE PRACTITIONER

## 2024-04-13 PROCEDURE — 94640 AIRWAY INHALATION TREATMENT: CPT

## 2024-04-13 PROCEDURE — 25000003 PHARM REV CODE 250

## 2024-04-13 PROCEDURE — 94760 N-INVAS EAR/PLS OXIMETRY 1: CPT

## 2024-04-13 PROCEDURE — 21400001 HC TELEMETRY ROOM

## 2024-04-13 PROCEDURE — 11000001 HC ACUTE MED/SURG PRIVATE ROOM

## 2024-04-13 PROCEDURE — 25000003 PHARM REV CODE 250: Performed by: SURGERY

## 2024-04-13 RX ORDER — PROPRANOLOL HYDROCHLORIDE 20 MG/1
20 TABLET ORAL 2 TIMES DAILY
Status: DISCONTINUED | OUTPATIENT
Start: 2024-04-13 | End: 2024-04-15

## 2024-04-13 RX ADMIN — POLYETHYLENE GLYCOL 3350 17 G: 17 POWDER, FOR SOLUTION ORAL at 10:04

## 2024-04-13 RX ADMIN — ENOXAPARIN SODIUM 40 MG: 40 INJECTION SUBCUTANEOUS at 10:04

## 2024-04-13 RX ADMIN — PROPRANOLOL HYDROCHLORIDE 20 MG: 20 TABLET ORAL at 08:04

## 2024-04-13 RX ADMIN — ENOXAPARIN SODIUM 40 MG: 40 INJECTION SUBCUTANEOUS at 08:04

## 2024-04-13 RX ADMIN — METHOCARBAMOL 500 MG: 500 TABLET ORAL at 08:04

## 2024-04-13 RX ADMIN — SODIUM CHLORIDE SOLN NEBU 3% 4 ML: 3 NEBU SOLN at 09:04

## 2024-04-13 RX ADMIN — BACITRACIN: 500 OINTMENT TOPICAL at 08:04

## 2024-04-13 RX ADMIN — THERA TABS 1 TABLET: TAB at 10:04

## 2024-04-13 RX ADMIN — METHOCARBAMOL 500 MG: 500 TABLET ORAL at 10:04

## 2024-04-13 RX ADMIN — IPRATROPIUM BROMIDE AND ALBUTEROL SULFATE 3 ML: 2.5; .5 SOLUTION RESPIRATORY (INHALATION) at 08:04

## 2024-04-13 RX ADMIN — Medication 2 TABLET: at 10:04

## 2024-04-13 RX ADMIN — SODIUM CHLORIDE SOLN NEBU 3% 4 ML: 3 NEBU SOLN at 01:04

## 2024-04-13 RX ADMIN — IPRATROPIUM BROMIDE AND ALBUTEROL SULFATE 3 ML: 2.5; .5 SOLUTION RESPIRATORY (INHALATION) at 09:04

## 2024-04-13 RX ADMIN — METHOCARBAMOL 500 MG: 500 TABLET ORAL at 03:04

## 2024-04-13 RX ADMIN — PROPRANOLOL HYDROCHLORIDE 20 MG: 20 TABLET ORAL at 10:04

## 2024-04-13 RX ADMIN — BACITRACIN: 500 OINTMENT TOPICAL at 03:04

## 2024-04-13 RX ADMIN — IPRATROPIUM BROMIDE AND ALBUTEROL SULFATE 3 ML: 2.5; .5 SOLUTION RESPIRATORY (INHALATION) at 01:04

## 2024-04-13 RX ADMIN — DOXAZOSIN 1 MG: 1 TABLET ORAL at 10:04

## 2024-04-13 RX ADMIN — DOCUSATE SODIUM LIQUID 100 MG: 100 LIQUID ORAL at 10:04

## 2024-04-13 RX ADMIN — QUETIAPINE FUMARATE 25 MG: 25 TABLET ORAL at 08:04

## 2024-04-13 RX ADMIN — BACITRACIN: 500 OINTMENT TOPICAL at 09:04

## 2024-04-13 RX ADMIN — LIDOCAINE 5% 2 PATCH: 700 PATCH TOPICAL at 03:04

## 2024-04-13 RX ADMIN — MODAFINIL 200 MG: 100 TABLET ORAL at 10:04

## 2024-04-13 RX ADMIN — Medication 6 MG: at 08:04
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
## 2024-04-14 LAB
POCT GLUCOSE: 127 MG/DL (ref 70–110)
POCT GLUCOSE: 140 MG/DL (ref 70–110)

## 2024-04-14 PROCEDURE — 21400001 HC TELEMETRY ROOM

## 2024-04-14 PROCEDURE — 25000242 PHARM REV CODE 250 ALT 637 W/ HCPCS: Performed by: NURSE PRACTITIONER

## 2024-04-14 PROCEDURE — 99900031 HC PATIENT EDUCATION (STAT)

## 2024-04-14 PROCEDURE — 25000003 PHARM REV CODE 250: Performed by: NURSE PRACTITIONER

## 2024-04-14 PROCEDURE — 11000001 HC ACUTE MED/SURG PRIVATE ROOM

## 2024-04-14 PROCEDURE — 94640 AIRWAY INHALATION TREATMENT: CPT

## 2024-04-14 PROCEDURE — 94760 N-INVAS EAR/PLS OXIMETRY 1: CPT

## 2024-04-14 PROCEDURE — 25000003 PHARM REV CODE 250

## 2024-04-14 PROCEDURE — 63600175 PHARM REV CODE 636 W HCPCS: Performed by: NURSE PRACTITIONER

## 2024-04-14 PROCEDURE — 99900035 HC TECH TIME PER 15 MIN (STAT)

## 2024-04-14 RX ADMIN — Medication 6 MG: at 10:04

## 2024-04-14 RX ADMIN — METHOCARBAMOL 500 MG: 500 TABLET ORAL at 10:04

## 2024-04-14 RX ADMIN — SODIUM CHLORIDE SOLN NEBU 3% 4 ML: 3 NEBU SOLN at 12:04

## 2024-04-14 RX ADMIN — POLYETHYLENE GLYCOL 3350 17 G: 17 POWDER, FOR SOLUTION ORAL at 09:04

## 2024-04-14 RX ADMIN — THERA TABS 1 TABLET: TAB at 09:04

## 2024-04-14 RX ADMIN — METHOCARBAMOL 500 MG: 500 TABLET ORAL at 09:04

## 2024-04-14 RX ADMIN — ENOXAPARIN SODIUM 40 MG: 40 INJECTION SUBCUTANEOUS at 09:04

## 2024-04-14 RX ADMIN — DOCUSATE SODIUM LIQUID 100 MG: 100 LIQUID ORAL at 09:04

## 2024-04-14 RX ADMIN — IPRATROPIUM BROMIDE AND ALBUTEROL SULFATE 3 ML: 2.5; .5 SOLUTION RESPIRATORY (INHALATION) at 08:04

## 2024-04-14 RX ADMIN — BACITRACIN: 500 OINTMENT TOPICAL at 09:04

## 2024-04-14 RX ADMIN — MODAFINIL 200 MG: 100 TABLET ORAL at 09:04

## 2024-04-14 RX ADMIN — SODIUM CHLORIDE SOLN NEBU 3% 4 ML: 3 NEBU SOLN at 08:04

## 2024-04-14 RX ADMIN — LIDOCAINE 5% 2 PATCH: 700 PATCH TOPICAL at 03:04

## 2024-04-14 RX ADMIN — IPRATROPIUM BROMIDE AND ALBUTEROL SULFATE 3 ML: 2.5; .5 SOLUTION RESPIRATORY (INHALATION) at 12:04

## 2024-04-14 RX ADMIN — Medication 2 TABLET: at 09:04

## 2024-04-14 RX ADMIN — QUETIAPINE FUMARATE 25 MG: 25 TABLET ORAL at 10:04

## 2024-04-14 RX ADMIN — ENOXAPARIN SODIUM 40 MG: 40 INJECTION SUBCUTANEOUS at 10:04

## 2024-04-14 RX ADMIN — METHOCARBAMOL 500 MG: 500 TABLET ORAL at 03:04

## 2024-04-14 RX ADMIN — DOXAZOSIN 1 MG: 1 TABLET ORAL at 09:04

## 2024-04-14 RX ADMIN — BACITRACIN: 500 OINTMENT TOPICAL at 03:04

## 2024-04-14 RX ADMIN — BACITRACIN: 500 OINTMENT TOPICAL at 10:04

## 2024-04-14 RX ADMIN — PROPRANOLOL HYDROCHLORIDE 20 MG: 20 TABLET ORAL at 10:04

## 2024-04-14 RX ADMIN — PROPRANOLOL HYDROCHLORIDE 20 MG: 20 TABLET ORAL at 09:04
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
## 2024-04-15 PROBLEM — R41.0 DELIRIUM: Status: ACTIVE | Noted: 2024-04-15

## 2024-04-15 LAB
ALBUMIN SERPL-MCNC: 2.5 G/DL (ref 3.4–4.8)
ALBUMIN/GLOB SERPL: 0.7 RATIO (ref 1.1–2)
ALP SERPL-CCNC: 299 UNIT/L (ref 40–150)
ALT SERPL-CCNC: 65 UNIT/L (ref 0–55)
AST SERPL-CCNC: 25 UNIT/L (ref 5–34)
BASOPHILS # BLD AUTO: 0.04 X10(3)/MCL
BASOPHILS NFR BLD AUTO: 0.6 %
BILIRUB SERPL-MCNC: 0.5 MG/DL
BUN SERPL-MCNC: 12.5 MG/DL (ref 8.4–25.7)
CALCIUM SERPL-MCNC: 8.4 MG/DL (ref 8.8–10)
CHLORIDE SERPL-SCNC: 109 MMOL/L (ref 98–107)
CO2 SERPL-SCNC: 23 MMOL/L (ref 23–31)
CREAT SERPL-MCNC: 0.57 MG/DL (ref 0.73–1.18)
CRP SERPL-MCNC: 46.5 MG/L
EOSINOPHIL # BLD AUTO: 0.2 X10(3)/MCL (ref 0–0.9)
EOSINOPHIL NFR BLD AUTO: 2.8 %
ERYTHROCYTE [DISTWIDTH] IN BLOOD BY AUTOMATED COUNT: 15.3 % (ref 11.5–17)
GFR SERPLBLD CREATININE-BSD FMLA CKD-EPI: >60 MLS/MIN/1.73/M2
GLOBULIN SER-MCNC: 3.8 GM/DL (ref 2.4–3.5)
GLUCOSE SERPL-MCNC: 106 MG/DL (ref 82–115)
HCT VFR BLD AUTO: 30.9 % (ref 42–52)
HGB BLD-MCNC: 9.7 G/DL (ref 14–18)
IMM GRANULOCYTES # BLD AUTO: 0.03 X10(3)/MCL (ref 0–0.04)
IMM GRANULOCYTES NFR BLD AUTO: 0.4 %
LYMPHOCYTES # BLD AUTO: 1.45 X10(3)/MCL (ref 0.6–4.6)
LYMPHOCYTES NFR BLD AUTO: 20.2 %
MAGNESIUM SERPL-MCNC: 2.2 MG/DL (ref 1.6–2.6)
MCH RBC QN AUTO: 29.1 PG (ref 27–31)
MCHC RBC AUTO-ENTMCNC: 31.4 G/DL (ref 33–36)
MCV RBC AUTO: 92.8 FL (ref 80–94)
MONOCYTES # BLD AUTO: 0.64 X10(3)/MCL (ref 0.1–1.3)
MONOCYTES NFR BLD AUTO: 8.9 %
NEUTROPHILS # BLD AUTO: 4.82 X10(3)/MCL (ref 2.1–9.2)
NEUTROPHILS NFR BLD AUTO: 67.1 %
NRBC BLD AUTO-RTO: 0 %
PHOSPHATE SERPL-MCNC: 2.6 MG/DL (ref 2.3–4.7)
PLATELET # BLD AUTO: 627 X10(3)/MCL (ref 130–400)
PMV BLD AUTO: 8.2 FL (ref 7.4–10.4)
POCT GLUCOSE: 108 MG/DL (ref 70–110)
POCT GLUCOSE: 111 MG/DL (ref 70–110)
POCT GLUCOSE: 117 MG/DL (ref 70–110)
POTASSIUM SERPL-SCNC: 4.6 MMOL/L (ref 3.5–5.1)
PREALB SERPL-MCNC: 15.8 MG/DL (ref 16–42)
PROT SERPL-MCNC: 6.3 GM/DL (ref 5.8–7.6)
RBC # BLD AUTO: 3.33 X10(6)/MCL (ref 4.7–6.1)
SODIUM SERPL-SCNC: 141 MMOL/L (ref 136–145)
WBC # SPEC AUTO: 7.18 X10(3)/MCL (ref 4.5–11.5)

## 2024-04-15 PROCEDURE — 99900031 HC PATIENT EDUCATION (STAT)

## 2024-04-15 PROCEDURE — 84100 ASSAY OF PHOSPHORUS: CPT

## 2024-04-15 PROCEDURE — 85025 COMPLETE CBC W/AUTO DIFF WBC: CPT

## 2024-04-15 PROCEDURE — 94640 AIRWAY INHALATION TREATMENT: CPT

## 2024-04-15 PROCEDURE — 25000003 PHARM REV CODE 250: Performed by: NURSE PRACTITIONER

## 2024-04-15 PROCEDURE — 27000646 HC AEROBIKA DEVICE

## 2024-04-15 PROCEDURE — 63600175 PHARM REV CODE 636 W HCPCS: Performed by: NURSE PRACTITIONER

## 2024-04-15 PROCEDURE — 97530 THERAPEUTIC ACTIVITIES: CPT | Mod: CO

## 2024-04-15 PROCEDURE — 97530 THERAPEUTIC ACTIVITIES: CPT | Mod: CQ

## 2024-04-15 PROCEDURE — 94761 N-INVAS EAR/PLS OXIMETRY MLT: CPT

## 2024-04-15 PROCEDURE — 25000242 PHARM REV CODE 250 ALT 637 W/ HCPCS: Performed by: NURSE PRACTITIONER

## 2024-04-15 PROCEDURE — 25000003 PHARM REV CODE 250

## 2024-04-15 PROCEDURE — 99900035 HC TECH TIME PER 15 MIN (STAT)

## 2024-04-15 PROCEDURE — 21400001 HC TELEMETRY ROOM

## 2024-04-15 PROCEDURE — 94760 N-INVAS EAR/PLS OXIMETRY 1: CPT

## 2024-04-15 PROCEDURE — 84134 ASSAY OF PREALBUMIN: CPT | Performed by: NURSE PRACTITIONER

## 2024-04-15 PROCEDURE — 80053 COMPREHEN METABOLIC PANEL: CPT

## 2024-04-15 PROCEDURE — 94664 DEMO&/EVAL PT USE INHALER: CPT

## 2024-04-15 PROCEDURE — 83735 ASSAY OF MAGNESIUM: CPT

## 2024-04-15 PROCEDURE — 11000001 HC ACUTE MED/SURG PRIVATE ROOM

## 2024-04-15 PROCEDURE — 86140 C-REACTIVE PROTEIN: CPT | Performed by: NURSE PRACTITIONER

## 2024-04-15 RX ORDER — MODAFINIL 100 MG/1
200 TABLET ORAL DAILY
Status: DISCONTINUED | OUTPATIENT
Start: 2024-04-15 | End: 2024-04-26

## 2024-04-15 RX ORDER — QUETIAPINE FUMARATE 25 MG/1
25 TABLET, FILM COATED ORAL NIGHTLY
Status: DISCONTINUED | OUTPATIENT
Start: 2024-04-15 | End: 2024-04-30 | Stop reason: HOSPADM

## 2024-04-15 RX ORDER — LORAZEPAM 1 MG/1
1 TABLET ORAL EVERY 6 HOURS PRN
Status: DISCONTINUED | OUTPATIENT
Start: 2024-04-15 | End: 2024-04-30

## 2024-04-15 RX ORDER — TALC
6 POWDER (GRAM) TOPICAL NIGHTLY PRN
Status: DISCONTINUED | OUTPATIENT
Start: 2024-04-15 | End: 2024-04-30 | Stop reason: HOSPADM

## 2024-04-15 RX ORDER — DOXAZOSIN 1 MG/1
1 TABLET ORAL DAILY
Status: DISCONTINUED | OUTPATIENT
Start: 2024-04-15 | End: 2024-04-30 | Stop reason: HOSPADM

## 2024-04-15 RX ORDER — METHOCARBAMOL 500 MG/1
500 TABLET, FILM COATED ORAL 3 TIMES DAILY
Status: DISCONTINUED | OUTPATIENT
Start: 2024-04-15 | End: 2024-04-25

## 2024-04-15 RX ORDER — GUAIFENESIN 100 MG/5ML
200 SOLUTION ORAL EVERY 6 HOURS
Status: DISCONTINUED | OUTPATIENT
Start: 2024-04-15 | End: 2024-04-17

## 2024-04-15 RX ORDER — OXYCODONE HYDROCHLORIDE 5 MG/1
5 TABLET ORAL EVERY 4 HOURS PRN
Status: DISCONTINUED | OUTPATIENT
Start: 2024-04-15 | End: 2024-04-29

## 2024-04-15 RX ORDER — DOCUSATE SODIUM 50 MG/5ML
100 LIQUID ORAL 2 TIMES DAILY
Status: DISCONTINUED | OUTPATIENT
Start: 2024-04-15 | End: 2024-04-30 | Stop reason: HOSPADM

## 2024-04-15 RX ORDER — PROPRANOLOL HYDROCHLORIDE 20 MG/1
20 TABLET ORAL 2 TIMES DAILY
Status: DISCONTINUED | OUTPATIENT
Start: 2024-04-15 | End: 2024-04-24

## 2024-04-15 RX ORDER — POLYETHYLENE GLYCOL 3350 17 G/17G
17 POWDER, FOR SOLUTION ORAL 2 TIMES DAILY
Status: DISCONTINUED | OUTPATIENT
Start: 2024-04-15 | End: 2024-04-30 | Stop reason: HOSPADM

## 2024-04-15 RX ADMIN — ENOXAPARIN SODIUM 40 MG: 40 INJECTION SUBCUTANEOUS at 08:04

## 2024-04-15 RX ADMIN — IPRATROPIUM BROMIDE AND ALBUTEROL SULFATE 3 ML: 2.5; .5 SOLUTION RESPIRATORY (INHALATION) at 08:04

## 2024-04-15 RX ADMIN — QUETIAPINE FUMARATE 25 MG: 25 TABLET ORAL at 08:04

## 2024-04-15 RX ADMIN — IPRATROPIUM BROMIDE AND ALBUTEROL SULFATE 3 ML: 2.5; .5 SOLUTION RESPIRATORY (INHALATION) at 12:04

## 2024-04-15 RX ADMIN — POLYETHYLENE GLYCOL 3350 17 G: 17 POWDER, FOR SOLUTION ORAL at 08:04

## 2024-04-15 RX ADMIN — BACITRACIN: 500 OINTMENT TOPICAL at 09:04

## 2024-04-15 RX ADMIN — SODIUM CHLORIDE SOLN NEBU 3% 4 ML: 3 NEBU SOLN at 08:04

## 2024-04-15 RX ADMIN — PROPRANOLOL HYDROCHLORIDE 20 MG: 20 TABLET ORAL at 08:04

## 2024-04-15 RX ADMIN — GUAIFENESIN 200 MG: 200 SOLUTION ORAL at 04:04

## 2024-04-15 RX ADMIN — Medication 2 TABLET: at 08:04

## 2024-04-15 RX ADMIN — SODIUM CHLORIDE SOLN NEBU 3% 4 ML: 3 NEBU SOLN at 12:04

## 2024-04-15 RX ADMIN — Medication 6 MG: at 08:04

## 2024-04-15 RX ADMIN — METHOCARBAMOL 500 MG: 500 TABLET ORAL at 02:04

## 2024-04-15 RX ADMIN — SODIUM CHLORIDE 500 ML: 9 INJECTION, SOLUTION INTRAVENOUS at 06:04

## 2024-04-15 RX ADMIN — THERA TABS 1 TABLET: TAB at 08:04

## 2024-04-15 RX ADMIN — DOCUSATE SODIUM LIQUID 100 MG: 100 LIQUID ORAL at 08:04

## 2024-04-15 RX ADMIN — SODIUM CHLORIDE SOLN NEBU 3% 4 ML: 3 NEBU SOLN at 02:04

## 2024-04-15 RX ADMIN — MODAFINIL 200 MG: 100 TABLET ORAL at 08:04

## 2024-04-15 RX ADMIN — METHOCARBAMOL 500 MG: 500 TABLET ORAL at 08:04

## 2024-04-15 RX ADMIN — DOXAZOSIN 1 MG: 1 TABLET ORAL at 08:04

## 2024-04-15 RX ADMIN — BACITRACIN: 500 OINTMENT TOPICAL at 08:04

## 2024-04-15 RX ADMIN — BACITRACIN: 500 OINTMENT TOPICAL at 02:04

## 2024-04-15 RX ADMIN — IPRATROPIUM BROMIDE AND ALBUTEROL SULFATE 3 ML: 2.5; .5 SOLUTION RESPIRATORY (INHALATION) at 02:04
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
## 2024-04-15 NOTE — PT/OT/SLP PROGRESS
Occupational Therapy   Treatment    Name: Anayeli Taylor  MRN: [id number]  Admitting Diagnosis:  Traumatic subdural hematoma (SDH)  19 Days Post-Op    Recommendations:     Recommended therapy intensity at discharge: Moderate Intensity Therapy   Discharge Equipment Recommendations:   (tbd)  Barriers to discharge:       Assessment:     Anayeli Taylor is a 73 y.o. male with a medical diagnosis of Traumatic subdural hematoma (SDH).  Performance deficits affecting function are weakness, impaired endurance, impaired self care skills, impaired functional mobility, impaired balance, decreased safety awareness, impaired cognition, decreased upper extremity function, orthopedic precautions. Session limited 2/2 elevated HR. Continues to require total A for mobility and actively resists movement.     Rehab Prognosis:  Fair and Poor; patient would benefit from acute skilled OT services to address these deficits and reach maximum level of function.       Plan:     Patient to be seen 2 x/week to address the above listed problems via self-care/home management, therapeutic activities, therapeutic exercises  Plan of Care Expires: 04/30/24  Plan of Care Reviewed with: patient    Subjective     Pain/Comfort:  Pain Rating 1: 0/10    Objective:     Communicated with: RN prior to session.  Patient found supine with peripheral IV, SCD, pressure relief boots, PureWick, telemetry upon OT entry to room.    General Precautions: Standard, fall, aspiration    Orthopedic Precautions:RUE non weight bearing (OK for ADLS, no OH activity)  Braces: UE Sling  Respiratory Status: Room air     Occupational Performance:     Bed Mobility:    Patient completed Rolling/Turning to Left with  total assistance  Long sitting from supine x2 trials with Total A x2. Actively resisting movement. HR noted to increase to 160s. Task terminated and positioned on wedge for sacrum offloading.      Therapeutic Positioning    OT interventions performed during the course of  today's session in an effort to prevent and/or reduce acquired pressure injuries:   Therapeutic positioning was provided at the conclusion of session to offload all bony prominences for the prevention and/or reduction of pressure injuries    [facility] 6 Click ADL:      Patient Education:  Patient provided with verbal education education regarding OT role/goals/POC and safety awareness.  Additional teaching is warranted.      Patient left left sidelying with all lines intact, call button in reach, wedge under R side, and pressure relief boots.    GOALS:   Multidisciplinary Problems       Occupational Therapy Goals          Problem: Occupational Therapy    Goal Priority Disciplines Outcome Interventions   Occupational Therapy Goal     OT, PT/OT Ongoing, Progressing    Description: LTG: Pt will perform basic ADLs and ADL t/fs with min A using LRAD by d/c.    STG: to be met in two weeks-   1. Pt will follow >80% of simple one step commands  2. Pt will perform grooming EOB with min A.   3. Pt will perform sit<>stand with mod A in prep for ADL t/fs.  4. Pt will perform functional mobility to/from toilet with min A using LRAD.                         Time Tracking:     OT Date of Treatment: 04/15/24  OT Start Time: 1440  OT Stop Time: 1452  OT Total Time (min): 12 min    Billable Minutes:Therapeutic Activity 12    OT/LAUREEN: LAUREEN     Number of LAUREEN visits since last OT visit: 2    4/15/2024

## 2024-04-15 NOTE — PT/OT/SLP PROGRESS
Physical Therapy Treatment    Patient Name:  Anayeli Taylor   MRN:  [id number]    Recommendations:     Discharge therapy intensity: Moderate Intensity Therapy   Discharge Equipment Recommendations:  (TBD)  Barriers to discharge: Decreased caregiver support and Impaired mobility    Assessment:     Anayeli Taylor is a 73 y.o. male.  He presents with the following impairments/functional limitations: weakness, impaired endurance, impaired self care skills, impaired functional mobility, gait instability, impaired balance, decreased lower extremity function, decreased upper extremity function, decreased safety awareness, decreased coordination.    Rehab Prognosis: Fair; patient would benefit from acute skilled PT services to address these deficits and reach maximum level of function.    Recent Surgery: Procedure(s) (LRB):  CRANIOTOMY, FOR SUBDURAL HEMATOMA EVACUATION-RIGHT (Right)  Insertion,central venous access device (Right) 19 Days Post-Op    Plan:     During this hospitalization, patient to be seen 2 x/week to address the identified rehab impairments via therapeutic exercises, therapeutic activities, gait training, neuromuscular re-education and progress toward the following goals:    Plan of Care Expires:  04/30/24    Subjective     Chief Complaint: pt confused    Objective:     Communicated with nurse prior to session.  Patient found supine with   upon PT entry to room.     General Precautions: Standard, aspiration, fall  Orthopedic Precautions: RUE non weight bearing  Braces: UE Sling  Respiratory Status:   Blood Pressure: 126/90  Skin Integrity:    Functional Mobility:  Sling donned RUE  Total x 2 EOB and same to get to chair.   Gait 4 steps x 2 trials total assist x 2. Limited participation and resisted movement due to confusion.       Education Provided:  Role and goals of PT, transfer training, bed mobility, gait training, balance training, safety awareness, assistive device, strengthening exercises, and importance  of participating in PT to return to PLOF.    Patient left up in chair with all lines intact, call button in reach, and enrique pad in place    GOALS:   Multidisciplinary Problems       Physical Therapy Goals          Problem: Physical Therapy    Goal Priority Disciplines Outcome Goal Variances Interventions   Physical Therapy Goal     PT, PT/OT Ongoing, Progressing     Description: Goals to be met by: 5/10/24     Patient will increase functional independence with mobility by performin. Supine to sit with MInimal Assistance  2. Sit to supine with MInimal Assistance  3. Rolling to Left and Right with Minimal Assistance.  4. Sit to stand transfer with Minimal Assistance  5. Bed to chair transfer with Minimal Assistance using LRAD  6. Sitting at edge of bed x10 minutes with Stand-by Assistance                         Time Tracking:       Billable Minutes Act 30    Treatment Type: Treatment  PT/PTA: PTA     Number of PTA visits since last PT visit: 1     04/15/2024

## 2024-04-15 NOTE — PLAN OF CARE
Response from [facility]     Unable to accept pt.  Need to see progress in therapy. Suggest lower level of care like SNF will reach fly tomorrow for snf choices

## 2024-04-16 LAB
ALBUMIN SERPL-MCNC: 2.6 G/DL (ref 3.4–4.8)
ALBUMIN/GLOB SERPL: 0.7 RATIO (ref 1.1–2)
ALP SERPL-CCNC: 303 UNIT/L (ref 40–150)
ALT SERPL-CCNC: 55 UNIT/L (ref 0–55)
AST SERPL-CCNC: 22 UNIT/L (ref 5–34)
BASOPHILS # BLD AUTO: 0.03 X10(3)/MCL
BASOPHILS NFR BLD AUTO: 0.5 %
BILIRUB SERPL-MCNC: 0.5 MG/DL
BUN SERPL-MCNC: 12.8 MG/DL (ref 8.4–25.7)
CALCIUM SERPL-MCNC: 8.5 MG/DL (ref 8.8–10)
CHLORIDE SERPL-SCNC: 109 MMOL/L (ref 98–107)
CO2 SERPL-SCNC: 24 MMOL/L (ref 23–31)
CREAT SERPL-MCNC: 0.68 MG/DL (ref 0.73–1.18)
EOSINOPHIL # BLD AUTO: 0.16 X10(3)/MCL (ref 0–0.9)
EOSINOPHIL NFR BLD AUTO: 2.5 %
ERYTHROCYTE [DISTWIDTH] IN BLOOD BY AUTOMATED COUNT: 15.6 % (ref 11.5–17)
GFR SERPLBLD CREATININE-BSD FMLA CKD-EPI: >60 MLS/MIN/1.73/M2
GLOBULIN SER-MCNC: 3.9 GM/DL (ref 2.4–3.5)
GLUCOSE SERPL-MCNC: 128 MG/DL (ref 82–115)
HCT VFR BLD AUTO: 33.4 % (ref 42–52)
HGB BLD-MCNC: 10.3 G/DL (ref 14–18)
IMM GRANULOCYTES # BLD AUTO: 0.03 X10(3)/MCL (ref 0–0.04)
IMM GRANULOCYTES NFR BLD AUTO: 0.5 %
LYMPHOCYTES # BLD AUTO: 1.38 X10(3)/MCL (ref 0.6–4.6)
LYMPHOCYTES NFR BLD AUTO: 21.9 %
MCH RBC QN AUTO: 29.3 PG (ref 27–31)
MCHC RBC AUTO-ENTMCNC: 30.8 G/DL (ref 33–36)
MCV RBC AUTO: 94.9 FL (ref 80–94)
MONOCYTES # BLD AUTO: 0.43 X10(3)/MCL (ref 0.1–1.3)
MONOCYTES NFR BLD AUTO: 6.8 %
NEUTROPHILS # BLD AUTO: 4.27 X10(3)/MCL (ref 2.1–9.2)
NEUTROPHILS NFR BLD AUTO: 67.8 %
NRBC BLD AUTO-RTO: 0 %
PLATELET # BLD AUTO: 514 X10(3)/MCL (ref 130–400)
PMV BLD AUTO: 8.2 FL (ref 7.4–10.4)
POCT GLUCOSE: 115 MG/DL (ref 70–110)
POCT GLUCOSE: 130 MG/DL (ref 70–110)
POTASSIUM SERPL-SCNC: 4.2 MMOL/L (ref 3.5–5.1)
PROT SERPL-MCNC: 6.5 GM/DL (ref 5.8–7.6)
RBC # BLD AUTO: 3.52 X10(6)/MCL (ref 4.7–6.1)
SODIUM SERPL-SCNC: 142 MMOL/L (ref 136–145)
WBC # SPEC AUTO: 6.3 X10(3)/MCL (ref 4.5–11.5)

## 2024-04-16 PROCEDURE — 94664 DEMO&/EVAL PT USE INHALER: CPT

## 2024-04-16 PROCEDURE — 85025 COMPLETE CBC W/AUTO DIFF WBC: CPT | Performed by: NURSE PRACTITIONER

## 2024-04-16 PROCEDURE — 21400001 HC TELEMETRY ROOM

## 2024-04-16 PROCEDURE — 92507 TX SP LANG VOICE COMM INDIV: CPT

## 2024-04-16 PROCEDURE — 80053 COMPREHEN METABOLIC PANEL: CPT | Performed by: NURSE PRACTITIONER

## 2024-04-16 PROCEDURE — 25000003 PHARM REV CODE 250: Mod: JZ,JG | Performed by: NURSE PRACTITIONER

## 2024-04-16 PROCEDURE — 11000001 HC ACUTE MED/SURG PRIVATE ROOM

## 2024-04-16 PROCEDURE — 63600175 PHARM REV CODE 636 W HCPCS: Performed by: NURSE PRACTITIONER

## 2024-04-16 PROCEDURE — 25000003 PHARM REV CODE 250: Performed by: NURSE PRACTITIONER

## 2024-04-16 PROCEDURE — 25000003 PHARM REV CODE 250

## 2024-04-16 PROCEDURE — 97168 OT RE-EVAL EST PLAN CARE: CPT

## 2024-04-16 PROCEDURE — 94799 UNLISTED PULMONARY SVC/PX: CPT | Mod: XB

## 2024-04-16 PROCEDURE — 94760 N-INVAS EAR/PLS OXIMETRY 1: CPT

## 2024-04-16 PROCEDURE — 99900031 HC PATIENT EDUCATION (STAT)

## 2024-04-16 PROCEDURE — 99900035 HC TECH TIME PER 15 MIN (STAT)

## 2024-04-16 RX ORDER — CALCIUM GLUCONATE 20 MG/ML
1 INJECTION, SOLUTION INTRAVENOUS ONCE
Status: COMPLETED | OUTPATIENT
Start: 2024-04-16 | End: 2024-04-16

## 2024-04-16 RX ADMIN — MODAFINIL 200 MG: 100 TABLET ORAL at 08:04

## 2024-04-16 RX ADMIN — DOCUSATE SODIUM LIQUID 100 MG: 100 LIQUID ORAL at 08:04

## 2024-04-16 RX ADMIN — METHOCARBAMOL 500 MG: 500 TABLET ORAL at 02:04

## 2024-04-16 RX ADMIN — GUAIFENESIN 200 MG: 200 SOLUTION ORAL at 11:04

## 2024-04-16 RX ADMIN — BACITRACIN: 500 OINTMENT TOPICAL at 08:04

## 2024-04-16 RX ADMIN — PROPRANOLOL HYDROCHLORIDE 20 MG: 20 TABLET ORAL at 08:04

## 2024-04-16 RX ADMIN — BACITRACIN: 500 OINTMENT TOPICAL at 02:04

## 2024-04-16 RX ADMIN — POLYETHYLENE GLYCOL 3350 17 G: 17 POWDER, FOR SOLUTION ORAL at 08:04

## 2024-04-16 RX ADMIN — Medication 6 MG: at 08:04

## 2024-04-16 RX ADMIN — GUAIFENESIN 200 MG: 200 SOLUTION ORAL at 05:04

## 2024-04-16 RX ADMIN — METHOCARBAMOL 500 MG: 500 TABLET ORAL at 08:04

## 2024-04-16 RX ADMIN — LIDOCAINE 5% 2 PATCH: 700 PATCH TOPICAL at 11:04

## 2024-04-16 RX ADMIN — CALCIUM GLUCONATE 1 G: 20 INJECTION, SOLUTION INTRAVENOUS at 02:04

## 2024-04-16 RX ADMIN — ENOXAPARIN SODIUM 40 MG: 40 INJECTION SUBCUTANEOUS at 08:04

## 2024-04-16 RX ADMIN — THERA TABS 1 TABLET: TAB at 08:04

## 2024-04-16 RX ADMIN — Medication 2 TABLET: at 08:04

## 2024-04-16 RX ADMIN — DOXAZOSIN 1 MG: 1 TABLET ORAL at 08:04

## 2024-04-16 RX ADMIN — QUETIAPINE FUMARATE 25 MG: 25 TABLET ORAL at 08:04

## 2024-04-16 RX ADMIN — GUAIFENESIN 200 MG: 200 SOLUTION ORAL at 06:04

## 2024-04-16 NOTE — PLAN OF CARE
Problem: Occupational Therapy  Goal: Occupational Therapy Goal  Description: LTG: Pt will perform basic ADLs and ADL t/fs with min A using LRAD by d/c.    STG: to be met in two weeks-   1. Pt will follow >80% of simple one step commands  2. Pt will perform grooming EOB with min A.   3. Pt will perform sit<>stand with mod A in prep for ADL t/fs.  4. Pt will perform functional mobility to/from toilet with min A using LRAD.    Outcome: Unable to Meet, pt not progressing toward any goals, OT to sign off

## 2024-04-16 NOTE — PT/OT/SLP PROGRESS
"[facility]  Speech Language Pathology Department  Therapy Progress Note    Patient Name:  Anayeli Taylor   MRN:  [id number]  Admitting Diagnosis: Traumatic subdural hematoma    Recommendations     General recommendations:  dysphagia therapy and cognitive-linguistic therapy  Communication strategies:  yes/no questions only, provide increased time to answer, and go to room if call light pushed    Discharge therapy intensity: Moderate Intensity Therapy   Barriers to safe discharge: severity of impairment and level of skilled assistance needed    Subjective     Patient awake and alert.    Pain/Comfort:  0/10  Spiritual/Cultural/Catholic Beliefs/Practices that affect care: no    Objective     Orientation: oriented to self only; not oriented to place, time, or situation despite cues    Sustained attention: limited attention; required frequent redirection to task    1-step directions: 20% mod-max cues    Simple yes/no questions: 10% moderate cues; towards end of session, perseverating on "yes"    Assessment     Pt continues to present with cognitive linguistic impairments negatively impacting safe, independent functioning. Skilled SLP intervention continues to be warranted at this time. SLP to continue POC.     Goals     Multidisciplinary Problems       SLP Goals          Problem: SLP    Goal Priority Disciplines Outcome   SLP Goal     SLP Ongoing, Progressing   Description: LTG: Pt will tolerate least restrictive PO diet with no clinical signs/sx - progressing  ST. Pt will tolerate ice chips with no signs/sx of aspiration - continue  2. Pt will tolerate puree solids with no signs/sx of aspiration - progressing  3. Tolerate thermal stimulation to the anterior faucial pillars x10 with 100% effortful swallow responses and delay less than 2 seconds - continue    LTG: complete basic cognitive tasks with supervision  STGs:  1.  Oriented x4 modified independent  2.  Attend to tx tasks x5 minutes " without redirection  3.  Follow 1-step directions 90%  4.  Answer simple yes/no questions 90%                     Patient Education     Patient provided with verbal education regarding SLP POC.  Additional teaching is warranted.    Plan     Will continue to follow and tx as appropriate.    SLP Follow-Up:  Yes   Patient to be seen:  5 x/week   Plan of Care expires:  04/24/24  Plan of Care reviewed with:  patient     Time Tracking     SLP Treatment Date:   04/16/24  Speech Start Time:  1538  Speech Stop Time:  1549     Speech Total Time (min):  11 min    Billable minutes:  Speech/Language Therapy, 11 minutes     04/16/2024

## 2024-04-16 NOTE — PT/OT/SLP RE-EVAL
Occupational Therapy   Re-evaluation/discharge    Name: Anayeli Taylor  MRN: [id number]  Admitting Diagnosis: traumatic SDH  Recent Surgery: Procedure(s) (LRB):  CRANIOTOMY, FOR SUBDURAL HEMATOMA EVACUATION-RIGHT (Right)  Insertion,central venous access device (Right) 20 Days Post-Op    Recommendations:     Discharge therapy intensity: Moderate Intensity Therapy   Discharge Equipment Recommendations:   (tbd)  Barriers to discharge:  Decreased caregiver support (ongoing medical needs, severity of deficits)    Assessment:     Anayeli Taylor is a 73 y.o. male with a medical diagnosis of traumatic SDH.  He presents with poor cognition/recall and learning and u/a to progress further toward any skilled therapy goals, actively resists all activity, further skilled therapy not appropriate and OT to sign off.   Plan:   OT to sign off.   Subjective     Chief Complaint: none initially  Patient/Family Comments/goals: none stated    Pain/Comfort:  Pain Rating 1:  (c/o some leg pain but u/a to specify or quantify)    Patients cultural, spiritual, Jehovah's witness conflicts given the current situation: no    Objective:     OT communicated with nsg  prior to session.      Patient was found supine with peripheral IV, NG tube, SCD, telemetry, pulse ox (continuous), PureWick upon OT entry to room.    General Precautions: Standard, fall, aspiration  Orthopedic Precautions: RUE non weight bearing (OK for ADLS, no OH activity)  Braces: UE Sling    Vital Signs:     Bed Mobility:    Total assist sup to long sitting, actively pushes back and resists, u/a to get pt to full long sit and u/a to bend at hips 2/2 resistance     Functional Mobility/Transfers:  Total assist scooting up in bed  Functional Mobility: total assist    Activities of Daily Living:  Wiping face with RUE with setup assist supported partial sitting In bed  Total assist to mandy sling  Total assist socks    AMPAC 6 Click ADL:  AMPAC Total Score:      Functional Cognition:  Knows name,  not oriented to place, date, poor recall and no evidence of any learning    Visual Perceptual Skills:  Appears grossly intact    Upper Extremity Function:  Right Upper Extremity: able to lift to ~45', able to bring to face to assist with grooming      Left Upper Extremity:  wfl    Balance:       Additional Treatment:      Patient Education:  Patient provided with verbal education education regarding OT role/goals/POC, fall prevention, and safety awareness.   No evidence of learning      Patient left HOB elevated with all lines intact and call button in reach    GOALS: u/a to meet, plan revised and OT to sign off  Multidisciplinary Problems       Occupational Therapy Goals          Problem: Occupational Therapy    Goal Priority Disciplines Outcome Interventions   Occupational Therapy Goal     OT, PT/OT Unable to Meet, Plan Revised    Description: LTG: Pt will perform basic ADLs and ADL t/fs with min A using LRAD by d/c.    STG: to be met in two weeks-   1. Pt will follow >80% of simple one step commands  2. Pt will perform grooming EOB with min A.   3. Pt will perform sit<>stand with mod A in prep for ADL t/fs.  4. Pt will perform functional mobility to/from toilet with min A using LRAD.                         History:     No past medical history on file.      Past Surgical History:   Procedure Laterality Date    CRANIOTOMY FOR EVACUATION OF SUBDURAL HEMATOMA Right 3/27/2024    Procedure: CRANIOTOMY, FOR SUBDURAL HEMATOMA EVACUATION-RIGHT;  Surgeon: Davonte Resendiz MD;  Location: [facility];  Service: Neurosurgery;  Laterality: Right;    INSERTION, CENTRAL VENOUS ACCESS DEVICE Right 3/27/2024    Procedure: Insertion,central venous access device;  Surgeon: Davonte Resendiz MD;  Location: [facility];  Service: Neurosurgery;  Laterality: Right;  per Dr. Woody- start at 0405       Time Tracking:     OT Date of Treatment: 04/16/24  OT Start Time: 0922  OT Stop Time: 0937  OT Total Time (min): 15 min    Billable  Minutes:Re-eval 15 min    4/16/2024
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
## 2024-04-16 NOTE — PROGRESS NOTES
Trauma Surgery   Progress Note  Admit Date: 3/26/2024  HD#21  POD#20 Days Post-Op    Subjective:   Interval history:  Afebrile. VSS. O2 sat stable on RA overnight.  Tachycardia overnight.  Improved with IV fluid bolus.  No complaints this morning.  Tolerating modified diet.  New labs reviewed.  Calcium replaced.    Home Meds:   Current Outpatient Medications   Medication Instructions    HYDROcodone-acetaminophen (NORCO) 7.5-325 mg per tablet 1 tablet, Oral, Every 6 hours PRN    olmesartan-hydrochlorothiazide (BENICAR HCT) 20-12.5 mg per tablet 1 tablet, Oral, Daily    sildenafiL (VIAGRA) 50 mg, Oral, Daily PRN      Scheduled Meds:  Current Facility-Administered Medications   Medication Dose Route Frequency Provider Last Rate Last Admin    bacitracin ointment   Topical (Top) TID Margarita Mueller AGACNP-BC   Given at 04/16/24 0833    bisacodyL suppository 10 mg  10 mg Rectal Daily PRN Margarita Mueller AGACNP-BC        calcium gluconate 1 g in NS IVPB (premixed)  1 g Intravenous Once Bran Slade FNP        calcium-vitamin D3 500 mg-5 mcg (200 unit) per tablet 2 tablet  2 tablet Oral Daily Margarita Mueller AGACNP-BC   2 tablet at 04/16/24 0834    dextrose 10% bolus 125 mL 125 mL  12.5 g Intravenous PRN Margarita Mueller AGACNP-BC        dextrose 10% bolus 250 mL 250 mL  25 g Intravenous PRN Margarita Mueller AGACNP-BC        docusate 50 mg/5 mL liquid 100 mg  100 mg Oral BID Rachel, Jeanette, NP   100 mg at 04/16/24 0834    doxazosin tablet 1 mg  1 mg Oral Daily Rachel, Jeanette, NP   1 mg at 04/16/24 0834    enoxaparin injection 40 mg  40 mg Subcutaneous Q12H (prophylaxis, 0900/2100) Margarita Mueller AGACNP-BC   40 mg at 04/16/24 0833    glucagon (human recombinant) injection 1 mg  1 mg Intramuscular PRN Margarita Mueller AGACNP-BC        guaiFENesin 100 mg/5 ml syrup 200 mg  200 mg Oral Q6H Jeanette Ramos NP   200 mg at 04/16/24 1126    hydrALAZINE injection 10 mg  10 mg Intravenous Q6H PRN  Margarita Mueller AGACNP-BC   10 mg at 03/31/24 0039    insulin aspart U-100 injection 0-5 Units  0-5 Units Subcutaneous Q6H PRN Margarita Mueller AGACNP-BC   2 Units at 04/10/24 0614    labetaloL injection 10 mg  10 mg Intravenous Q6H PRN Margarita Mueller AGAEDGARDOP-BC        LIDOcaine 5 % patch 2 patch  2 patch Transdermal Q24H Margarita Mueller AGACNP-BC   2 patch at 04/16/24 1125    LORazepam tablet 1 mg  1 mg Oral Q6H PRN Rachel, Jeanette, NP        melatonin tablet 6 mg  6 mg Oral Nightly PRN Rachel, Jeanette, NP   6 mg at 04/15/24 2056    methocarbamoL tablet 500 mg  500 mg Oral TID Rachel, Jeanette, NP   500 mg at 04/16/24 0836    modafiniL tablet 200 mg  200 mg Oral Daily Rachel, Jeanette, NP   200 mg at 04/16/24 0852    multivitamin tablet  1 tablet Oral Daily Rachel, Jeanette, NP   1 tablet at 04/16/24 0834    ondansetron injection 4 mg  4 mg Intravenous Q6H PRN Margarita Mueller AGACNP-BC   4 mg at 03/27/24 0110    oxyCODONE immediate release tablet 5 mg  5 mg Oral Q4H PRN Rachel, Jeanette, NP        polyethylene glycol packet 17 g  17 g Oral BID Rachel, Jeanette, NP   17 g at 04/16/24 0833    propranoloL tablet 20 mg  20 mg Oral BID Rachel, Jeanette, NP   20 mg at 04/16/24 0834    QUEtiapine tablet 25 mg  25 mg Oral QHS Rachel, Jeanette, NP   25 mg at 04/15/24 2056     Continuous Infusions:  Current Facility-Administered Medications   Medication Dose Route Frequency Provider Last Rate Last Admin    bacitracin ointment   Topical (Top) TID Margarita Mueller AGACNP-BC   Given at 04/16/24 0833    bisacodyL suppository 10 mg  10 mg Rectal Daily PRN Margarita Mueller AGACNP-BC        calcium gluconate 1 g in NS IVPB (premixed)  1 g Intravenous Once Yany, Bran M, AMBER        calcium-vitamin D3 500 mg-5 mcg (200 unit) per tablet 2 tablet  2 tablet Oral Daily Margarita Mueller, SYMONEP-BC   2 tablet at 04/16/24 0834    dextrose 10% bolus 125 mL 125 mL  12.5 g Intravenous PRN Ervin,  Margarita MANNNIG, AGACNP-BC        dextrose 10% bolus 250 mL 250 mL  25 g Intravenous PRN Margarita Mueller, AGACNP-BC        docusate 50 mg/5 mL liquid 100 mg  100 mg Oral BID Rachel, Jeanette, NP   100 mg at 04/16/24 0834    doxazosin tablet 1 mg  1 mg Oral Daily Rachel, Jeanette, NP   1 mg at 04/16/24 0834    enoxaparin injection 40 mg  40 mg Subcutaneous Q12H (prophylaxis, 0900/2100) Margarita Mueller AGACNP-BC   40 mg at 04/16/24 0833    glucagon (human recombinant) injection 1 mg  1 mg Intramuscular PRN Margarita Mueller, AGACNP-BC        guaiFENesin 100 mg/5 ml syrup 200 mg  200 mg Oral Q6H Rachel, Jeanette, NP   200 mg at 04/16/24 1126    hydrALAZINE injection 10 mg  10 mg Intravenous Q6H PRN Margarita Mueller, AGACNP-BC   10 mg at 03/31/24 0039    insulin aspart U-100 injection 0-5 Units  0-5 Units Subcutaneous Q6H PRN Margarita Mueller, AGACNP-BC   2 Units at 04/10/24 0614    labetaloL injection 10 mg  10 mg Intravenous Q6H PRN Margarita Mueller, AGACNP-BC        LIDOcaine 5 % patch 2 patch  2 patch Transdermal Q24H Margarita Mueller AGACNP-BC   2 patch at 04/16/24 1125    LORazepam tablet 1 mg  1 mg Oral Q6H PRN Rachel, Jeanette, NP        melatonin tablet 6 mg  6 mg Oral Nightly PRN Rachel, Jeanette, NP   6 mg at 04/15/24 2056    methocarbamoL tablet 500 mg  500 mg Oral TID Racehl, Jeanette, NP   500 mg at 04/16/24 0836    modafiniL tablet 200 mg  200 mg Oral Daily Rachel, Jeanette, NP   200 mg at 04/16/24 0852    multivitamin tablet  1 tablet Oral Daily Rachel, Jeanette, NP   1 tablet at 04/16/24 0834    ondansetron injection 4 mg  4 mg Intravenous Q6H PRN Margarita Mueller, AGACNP-BC   4 mg at 03/27/24 0110    oxyCODONE immediate release tablet 5 mg  5 mg Oral Q4H PRN Jeanette Ramos NP        polyethylene glycol packet 17 g  17 g Oral BID Glenna Ramosline, NP   17 g at 04/16/24 0833    propranoloL tablet 20 mg  20 mg Oral BID Frederick Ramosqueline, NP   20 mg at 04/16/24 0834     QUEtiapine tablet 25 mg  25 mg Oral QHS Rachel, Jeanette, NP   25 mg at 04/15/24 2056     PRN Meds:  Current Facility-Administered Medications   Medication Dose Route Frequency Provider Last Rate Last Admin    bacitracin ointment   Topical (Top) TID Margarita Muelelr AGACNP-BC   Given at 04/16/24 0833    bisacodyL suppository 10 mg  10 mg Rectal Daily PRN Margarita Mueller AGACNP-BC        calcium gluconate 1 g in NS IVPB (premixed)  1 g Intravenous Once Bran Slade FNP        calcium-vitamin D3 500 mg-5 mcg (200 unit) per tablet 2 tablet  2 tablet Oral Daily Margarita Mueller AGACNP-BC   2 tablet at 04/16/24 0834    dextrose 10% bolus 125 mL 125 mL  12.5 g Intravenous PRN Margarita Mueller AGACNP-BC        dextrose 10% bolus 250 mL 250 mL  25 g Intravenous PRN Margarita Mueller AGACNP-BC        docusate 50 mg/5 mL liquid 100 mg  100 mg Oral BID Rachel, Jeanette, NP   100 mg at 04/16/24 0834    doxazosin tablet 1 mg  1 mg Oral Daily Glenna Ramosline, NP   1 mg at 04/16/24 0834    enoxaparin injection 40 mg  40 mg Subcutaneous Q12H (prophylaxis, 0900/2100) Margarita Mueller AGACNP-BC   40 mg at 04/16/24 0833    glucagon (human recombinant) injection 1 mg  1 mg Intramuscular PRN Margarita Mueller AGACNP-BC        guaiFENesin 100 mg/5 ml syrup 200 mg  200 mg Oral Q6H Rachel, Jeanette, NP   200 mg at 04/16/24 1126    hydrALAZINE injection 10 mg  10 mg Intravenous Q6H PRN Margarita Mueller AGACNP-BC   10 mg at 03/31/24 0039    insulin aspart U-100 injection 0-5 Units  0-5 Units Subcutaneous Q6H PRN Margarita Mueller AGAEDGARDOP-BC   2 Units at 04/10/24 0614    labetaloL injection 10 mg  10 mg Intravenous Q6H PRN Margarita Mueller AGACNP-BC        LIDOcaine 5 % patch 2 patch  2 patch Transdermal Q24H Margarita Mueller AGACNP-BC   2 patch at 04/16/24 1125    LORazepam tablet 1 mg  1 mg Oral Q6H PRN Jeanette Ramos NP        melatonin tablet 6 mg  6 mg Oral Nightly PRN Jeanette Ramos NP   6 mg at  "04/15/24 2056    methocarbamoL tablet 500 mg  500 mg Oral TID Rachel, Jeanette, NP   500 mg at 04/16/24 0836    modafiniL tablet 200 mg  200 mg Oral Daily Rachel, Jeanette, NP   200 mg at 04/16/24 0852    multivitamin tablet  1 tablet Oral Daily Rachel, Jeanette, NP   1 tablet at 04/16/24 0834    ondansetron injection 4 mg  4 mg Intravenous Q6H PRN Margarita Mueller AGACNP-BC   4 mg at 03/27/24 0110    oxyCODONE immediate release tablet 5 mg  5 mg Oral Q4H PRN Rachel, Jeanette, NP        polyethylene glycol packet 17 g  17 g Oral BID Rachel, Jeanette, NP   17 g at 04/16/24 0833    propranoloL tablet 20 mg  20 mg Oral BID Rachel, Jeanette, NP   20 mg at 04/16/24 0834    QUEtiapine tablet 25 mg  25 mg Oral QHS Rachel, Jeanette, NP   25 mg at 04/15/24 2056        Objective:     VITAL SIGNS: 24 HR MIN & MAX LAST   Temp  Min: 97.4 °F (36.3 °C)  Max: 99.4 °F (37.4 °C)  98.1 °F (36.7 °C)   BP  Min: 105/63  Max: 130/83  130/83    Pulse  Min: 83  Max: 133  (!) 130    Resp  Min: 16  Max: 18  18    SpO2  Min: 97 %  Max: 99 %  97 %      HT: 5' 5.98" (167.6 cm)  WT: 55.2 kg (121 lb 11.1 oz)  BMI: 19.7     Intake/output:  Intake/Output - Last 3 Shifts         04/14 0700  04/15 0659 04/15 0700  04/16 0659 04/16 0700  04/17 0659    P.O. 720 840     Total Intake(mL/kg) 720 (13) 840 (15.2)     Urine (mL/kg/hr) 600 (0.5) 1301 (1)     Other 0      Stool  0     Total Output 600 1301     Net +120 -461            Urine Occurrence 1 x              Intake/Output Summary (Last 24 hours) at 4/16/2024 1414  Last data filed at 4/16/2024 0300  Gross per 24 hour   Intake 120 ml   Output 301 ml   Net -181 ml         Lines/drains/airway:       Peripheral IV - Single Lumen 04/08/24 2245 20 G Left;Posterior Forearm (Active)   Site Assessment Clean;Dry;Intact 04/08/24 2000   Extremity Assessment Distal to IV No abnormal discoloration 04/08/24 2000   Line Status Capped 04/08/24 2000   Dressing Status Clean;Dry;Intact 04/08/24 2000 " "  Dressing Intervention Integrity maintained 04/08/24 2000   Number of days: 0            NG/OG Tube 03/30/24 0300 16 Fr. Right nostril (Active)   Placement Check placement verified by distal tube length measurement 04/06/24 0400   Distal Tube Length (cm) 60 04/07/24 0800   Tolerance no signs/symptoms of discomfort 04/08/24 2000   Securement secured to nostril center 04/08/24 2000   Clamp Status/Tolerance unclamped;no abdominal discomfort 04/08/24 2000   Suction Setting/Drainage Method suction at the bedside 04/08/24 2000   Insertion Site Appearance no redness, warmth, tenderness, skin breakdown, drainage 04/08/24 2000   Drainage None 04/08/24 2000   Flush/Irrigation flushed w/;water 04/08/24 2000   Feeding Type continuous;by pump 04/08/24 2000   Feeding Action feeding continued 04/08/24 2000   Current Rate (mL/hr) 55 mL/hr 04/08/24 2000   Goal Rate (mL/hr) 55 mL/hr 04/08/24 2000   Intake (mL) 474 mL 04/06/24 0525   Water Bolus (mL) 75 mL 04/08/24 2000   Rate Formula Tube Feeding (mL/hr) 55 mL/hr 04/08/24 2000   Formula Name impact peptide 1.5 04/08/24 2000   Intake (mL) - Formula Tube Feeding 535 04/03/24 0559   Number of days: 10       Female External Urinary Catheter w/ Suction 04/07/24 0800 (Active)   Number of days: 1       Physical examination:  Gen: NAD, AAOx2, answering questions appropriately  HEENT: Normocephalic, scalp incision C/D/I. Facial contusions healing.   CV: RRR  Resp: NWOB, R clavicle deformity  Abd: S/NT/ND, last BM 4/11  Msk: moving all extremities spontaneously and purposefully  Neuro: Oriented to self and place, follows commands  Skin/wounds: Warm, dry    Labs:  Renal:  Recent Labs     04/15/24  0429 04/16/24  0900   BUN 12.5 12.8   CREATININE 0.57* 0.68*       No results for input(s): "LACTIC" in the last 72 hours.  FEN/GI:  Recent Labs     04/15/24  0429 04/16/24  0900    142   K 4.6 4.2   CO2 23 24   CALCIUM 8.4* 8.5*   MG 2.20  --    PHOS 2.6  --    ALBUMIN 2.5* 2.6*   BILITOT 0.5 " "0.5   AST 25 22   ALKPHOS 299* 303*   ALT 65* 55       Heme:  Recent Labs     04/15/24  0429 04/16/24  0900   HGB 9.7* 10.3*   HCT 30.9* 33.4*   * 514*       ID:  Recent Labs     04/15/24  0429 04/16/24  0900   WBC 7.18 6.30       CBG:  Recent Labs     04/15/24  0429 04/16/24  0900   GLUCOSE 106 128*        Recent Labs     04/13/24  2022 04/14/24  0527 04/14/24  1116 04/15/24  0628 04/15/24  1612 04/15/24  2042 04/16/24  0626 04/16/24  1103   POCTGLUCOSE 153* 127* 140* 108 117* 111* 115* 130*      Cardiovascular:  No results for input(s): "TROPONINI", "CKTOTAL", "CKMB", "BNP" in the last 168 hours.    I have reviewed all pertinent lab results within the past 24 hours.    Imaging:     I have reviewed all pertinent imaging results/findings within the past 24 hours.    Micro/Path/Other:  Microbiology Results (last 7 days)       ** No results found for the last 168 hours. **           Pathology Results  (Last 7 days)      None             Problems list:  Active Problem List with Overview Notes    Diagnosis Date Noted    Delirium 04/15/2024    Hemopneumothorax on right 04/02/2024    Contusion of right lung 04/02/2024    Adrenal hemorrhage 04/02/2024    Scalp laceration 04/02/2024    Elbow laceration, left, initial encounter 04/02/2024    Acute blood loss anemia 03/29/2024    Acute hypoxemic respiratory failure 03/27/2024    Traumatic subdural hematoma (SDH) 03/27/2024    Aspiration pneumonia due to gastric secretions 03/27/2024    Closed fracture of multiple ribs 03/27/2024    Closed nondisplaced fracture of sternal end of right clavicle 03/27/2024        Assessment & Plan:     SDH, SAH  - s/p emergent craniotomy 3/27/24  - Serial neuro q shift  - Keep HOB > 30 degrees  - Keppra completed 4/2/24  - Promote good sleep-wake cycles  - Provigil qAM  - Seroquel qHS  - Propranolol BID    R 3-9 rib fractures, R PTX/FLIP, R pulmonary contusions  - s/p acute respiratory failure s/t trauma, resolved - extubated 3/29/24  - " BiPAP at night prn; has been tolerating RA without difficulty overnight  - Cefepmine x 7 days completed 4/10/24  - Encourage frequent IS  - Good pulmonary hygiene  - Nebs q6h    R clavicle fracture  - Evaluated by Orthopedics, non-op management  - Sling for comfort  - OK for ADLs, no overhead activity  - Pendulum ROM shoulder, full ROM distally    Dysphagia  - Continue working with ST  - Modified diet - Pureed with mildly thick liquids    Auto vs Scooter  - M/Th labs  - MMPC  - PT/OT  - SCDs & Lovenox 40 mg q12h for VTE ppx  - CM following for neuro rehab placement. Medically stable for placement once bed available.

## 2024-04-17 LAB
POCT GLUCOSE: 100 MG/DL (ref 70–110)
POCT GLUCOSE: 109 MG/DL (ref 70–110)
POCT GLUCOSE: 130 MG/DL (ref 70–110)
POCT GLUCOSE: 99 MG/DL (ref 70–110)

## 2024-04-17 PROCEDURE — 25000003 PHARM REV CODE 250: Performed by: NURSE PRACTITIONER

## 2024-04-17 PROCEDURE — 97530 THERAPEUTIC ACTIVITIES: CPT

## 2024-04-17 PROCEDURE — 21400001 HC TELEMETRY ROOM

## 2024-04-17 PROCEDURE — 97110 THERAPEUTIC EXERCISES: CPT

## 2024-04-17 PROCEDURE — 99900031 HC PATIENT EDUCATION (STAT)

## 2024-04-17 PROCEDURE — 11000001 HC ACUTE MED/SURG PRIVATE ROOM

## 2024-04-17 PROCEDURE — 63600175 PHARM REV CODE 636 W HCPCS: Performed by: NURSE PRACTITIONER

## 2024-04-17 PROCEDURE — 99900035 HC TECH TIME PER 15 MIN (STAT)

## 2024-04-17 PROCEDURE — 25000003 PHARM REV CODE 250

## 2024-04-17 PROCEDURE — 27000646 HC AEROBIKA DEVICE

## 2024-04-17 PROCEDURE — 92507 TX SP LANG VOICE COMM INDIV: CPT

## 2024-04-17 PROCEDURE — 94664 DEMO&/EVAL PT USE INHALER: CPT

## 2024-04-17 RX ADMIN — POLYETHYLENE GLYCOL 3350 17 G: 17 POWDER, FOR SOLUTION ORAL at 09:04

## 2024-04-17 RX ADMIN — Medication 2 TABLET: at 08:04

## 2024-04-17 RX ADMIN — METHOCARBAMOL 500 MG: 500 TABLET ORAL at 09:04

## 2024-04-17 RX ADMIN — QUETIAPINE FUMARATE 25 MG: 25 TABLET ORAL at 09:04

## 2024-04-17 RX ADMIN — METHOCARBAMOL 500 MG: 500 TABLET ORAL at 08:04

## 2024-04-17 RX ADMIN — THERA TABS 1 TABLET: TAB at 08:04

## 2024-04-17 RX ADMIN — DOXAZOSIN 1 MG: 1 TABLET ORAL at 08:04

## 2024-04-17 RX ADMIN — GUAIFENESIN AND DEXTROMETHORPHAN HYDROBROMIDE 1 TABLET: 30; 600 TABLET, EXTENDED RELEASE ORAL at 08:04

## 2024-04-17 RX ADMIN — DOCUSATE SODIUM LIQUID 100 MG: 100 LIQUID ORAL at 08:04

## 2024-04-17 RX ADMIN — ENOXAPARIN SODIUM 40 MG: 40 INJECTION SUBCUTANEOUS at 09:04

## 2024-04-17 RX ADMIN — ENOXAPARIN SODIUM 40 MG: 40 INJECTION SUBCUTANEOUS at 08:04

## 2024-04-17 RX ADMIN — PROPRANOLOL HYDROCHLORIDE 20 MG: 20 TABLET ORAL at 09:04

## 2024-04-17 RX ADMIN — POLYETHYLENE GLYCOL 3350 17 G: 17 POWDER, FOR SOLUTION ORAL at 08:04

## 2024-04-17 RX ADMIN — GUAIFENESIN AND DEXTROMETHORPHAN HYDROBROMIDE 1 TABLET: 30; 600 TABLET, EXTENDED RELEASE ORAL at 09:04

## 2024-04-17 RX ADMIN — BACITRACIN: 500 OINTMENT TOPICAL at 08:04

## 2024-04-17 RX ADMIN — BACITRACIN: 500 OINTMENT TOPICAL at 09:04

## 2024-04-17 RX ADMIN — LIDOCAINE 5% 2 PATCH: 700 PATCH TOPICAL at 11:04

## 2024-04-17 RX ADMIN — MODAFINIL 200 MG: 100 TABLET ORAL at 08:04

## 2024-04-17 RX ADMIN — METHOCARBAMOL 500 MG: 500 TABLET ORAL at 02:04

## 2024-04-17 RX ADMIN — Medication 6 MG: at 09:04

## 2024-04-17 RX ADMIN — PROPRANOLOL HYDROCHLORIDE 20 MG: 20 TABLET ORAL at 08:04

## 2024-04-17 RX ADMIN — DOCUSATE SODIUM LIQUID 100 MG: 100 LIQUID ORAL at 09:04

## 2024-04-17 NOTE — PT/OT/SLP PROGRESS
Physical Therapy Treatment    Patient Name:  Anayeli Taylor   MRN:  [id number]    Recommendations:     Discharge therapy intensity: Moderate Intensity Therapy   Discharge Equipment Recommendations: none  Barriers to discharge: None    Assessment:     Anayeli Taylor is a 73 y.o. male admitted with a medical diagnosis of traumatic SDH.  He presents with the following impairments/functional limitations: weakness, impaired endurance, impaired self care skills, impaired functional mobility, gait instability, impaired balance, impaired cognition, decreased lower extremity function, decreased upper extremity function, decreased safety awareness, pain, decreased ROM. Not progressing with PT. Plan is to sign off soon if patient continues on this path.     Rehab Prognosis: Poor; patient would benefit from acute skilled PT services to address these deficits and reach maximum level of function.    Recent Surgery: Procedure(s) (LRB):  CRANIOTOMY, FOR SUBDURAL HEMATOMA EVACUATION-RIGHT (Right)  Insertion,central venous access device (Right) 21 Days Post-Op    Plan:     During this hospitalization, patient to be seen 2 x/week to address the identified rehab impairments via gait training, therapeutic exercises, therapeutic activities, neuromuscular re-education and progress toward the following goals:    Plan of Care Expires:  04/30/24    Subjective     Chief Complaint: pain  Patient/Family Comments/goals: none  Pain/Comfort:  Pain Rating 1:  (C/o leg pain when performing PROM on LE's. No rating)      Objective:     Communicated with nurse prior to session.  Patient found HOB elevated with peripheral IV, pressure relief boots, SCD, PureWick, telemetry upon PT entry to room.     General Precautions: Standard, fall, aspiration  Orthopedic Precautions: RUE non weight bearing (OK for ADLS, no OH activity)  Braces: UE Sling  Respiratory Status: Room air  Blood Pressure: 114/64  Skin Integrity: Visible skin intact      Functional  Mobility:  Bed Mobility:  Rolling Left:  dependence  Rolling Right: dependence  Scooting: dependence  Supine to Sit: dependence  Sit to Supine: dependence  Transfers:  Sit to Stand:  dependence with no AD  Sitting Balance: poor  Standing Balance/Tolerance: Poor    Therapeutic Exercises:  PROM: heel slides, SLR, hip ABD/ADD, AP. 2 x 20 reps    Education Provided:  Role and goals of PT, transfer training, bed mobility,  balance training, safety awareness, assistive device, strengthening exercises, and importance of participating in PT to return to PLOF.    Patient left HOB elevated with all lines intact, call button in reach, pressure relief boots, and bed alarm on    GOALS:   Multidisciplinary Problems       Physical Therapy Goals          Problem: Physical Therapy    Goal Priority Disciplines Outcome Goal Variances Interventions   Physical Therapy Goal     PT, PT/OT Ongoing, Progressing     Description: Goals to be met by: 5/10/24     Patient will increase functional independence with mobility by performin. Supine to sit with MInimal Assistance  2. Sit to supine with MInimal Assistance  3. Rolling to Left and Right with Minimal Assistance.  4. Sit to stand transfer with Minimal Assistance  5. Bed to chair transfer with Minimal Assistance using LRAD  6. Sitting at edge of bed x10 minutes with Stand-by Assistance                         Time Tracking:     PT Received On: 24  PT Start Time: 825     PT Stop Time: 45  PT Total Time (min): 20 min     Billable Minutes: Therapeutic Activity 13 minutes and Therapeutic Exercise 10 minutes    Treatment Type: Treatment  PT/PTA: PT     Number of PTA visits since last PT visit: 2     2024

## 2024-04-17 NOTE — PT/OT/SLP PROGRESS
"[facility]  Speech Language Pathology Department  Therapy Progress Note    Patient Name:  Anayeli Taylor   MRN:  [id number]  Admitting Diagnosis: Traumatic subdural hematoma    Recommendations     General recommendations:  dysphagia therapy and cognitive-linguistic therapy  Communication strategies:  yes/no questions only, provide increased time to answer, and go to room if call light pushed    Discharge therapy intensity: Moderate Intensity Therapy   Barriers to safe discharge: severity of impairment and level of skilled assistance needed    Subjective     Patient awake and alert.    Pain/Comfort: Pain Rating 1: 0/10  Spiritual/Cultural/Gnosticist Beliefs/Practices that affect care: no    Objective     Orientation: oriented to self only, oriented to place with verbal/visual cues, unable to provide time or situation    Sustained attention: limited attention; required frequent redirection to tasks    1-step directions: 60% max cues    Simple yes/no questions: unreliable at this time; "yes" bias     Assessment     Pt continues to present with cognitive linguistic impairments negatively impacting safe, independent functioning. Skilled SLP intervention continues to be warranted at this time. SLP to continue POC.     Goals     Multidisciplinary Problems       SLP Goals          Problem: SLP    Goal Priority Disciplines Outcome   SLP Goal     SLP Ongoing, Progressing   Description: LTG: Pt will tolerate least restrictive PO diet with no clinical signs/sx - progressing  ST. Pt will tolerate ice chips with no signs/sx of aspiration - continue  2. Pt will tolerate puree solids with no signs/sx of aspiration - progressing  3. Tolerate thermal stimulation to the anterior faucial pillars x10 with 100% effortful swallow responses and delay less than 2 seconds - continue    LTG: complete basic cognitive tasks with supervision  STGs:  1.  Oriented x4 modified independent  2.  Attend to tx tasks x5 " minutes without redirection  3.  Follow 1-step directions 90%  4.  Answer simple yes/no questions 90%                     Patient Education     Patient provided with verbal education regarding SLP POC.  Additional teaching is warranted.    Plan     Will continue to follow and tx as appropriate.    SLP Follow-Up:  Yes   Patient to be seen:  5 x/week   Plan of Care expires:  04/24/24  Plan of Care reviewed with:  patient     Time Tracking     SLP Treatment Date:   04/17/24  Speech Start Time:  1330  Speech Stop Time:  1350     Speech Total Time (min):  20 min    Billable minutes:  Speech/Language Therapy, 20 minutes     04/17/2024

## 2024-04-18 LAB
ALBUMIN SERPL-MCNC: 2.5 G/DL (ref 3.4–4.8)
ALBUMIN/GLOB SERPL: 0.6 RATIO (ref 1.1–2)
ALP SERPL-CCNC: 294 UNIT/L (ref 40–150)
ALT SERPL-CCNC: 39 UNIT/L (ref 0–55)
AST SERPL-CCNC: 20 UNIT/L (ref 5–34)
BASOPHILS # BLD AUTO: 0.04 X10(3)/MCL
BASOPHILS NFR BLD AUTO: 0.9 %
BILIRUB SERPL-MCNC: 0.5 MG/DL
BUN SERPL-MCNC: 12.6 MG/DL (ref 8.4–25.7)
CALCIUM SERPL-MCNC: 8.6 MG/DL (ref 8.8–10)
CHLORIDE SERPL-SCNC: 106 MMOL/L (ref 98–107)
CO2 SERPL-SCNC: 26 MMOL/L (ref 23–31)
CREAT SERPL-MCNC: 0.59 MG/DL (ref 0.73–1.18)
CRP SERPL-MCNC: 49.4 MG/L
EOSINOPHIL # BLD AUTO: 0.17 X10(3)/MCL (ref 0–0.9)
EOSINOPHIL NFR BLD AUTO: 3.7 %
ERYTHROCYTE [DISTWIDTH] IN BLOOD BY AUTOMATED COUNT: 15.9 % (ref 11.5–17)
GFR SERPLBLD CREATININE-BSD FMLA CKD-EPI: >60 MLS/MIN/1.73/M2
GLOBULIN SER-MCNC: 4.2 GM/DL (ref 2.4–3.5)
GLUCOSE SERPL-MCNC: 87 MG/DL (ref 82–115)
HCT VFR BLD AUTO: 32.1 % (ref 42–52)
HGB BLD-MCNC: 9.9 G/DL (ref 14–18)
IMM GRANULOCYTES # BLD AUTO: 0.01 X10(3)/MCL (ref 0–0.04)
IMM GRANULOCYTES NFR BLD AUTO: 0.2 %
LYMPHOCYTES # BLD AUTO: 1.36 X10(3)/MCL (ref 0.6–4.6)
LYMPHOCYTES NFR BLD AUTO: 30 %
MAGNESIUM SERPL-MCNC: 2.1 MG/DL (ref 1.6–2.6)
MCH RBC QN AUTO: 29.2 PG (ref 27–31)
MCHC RBC AUTO-ENTMCNC: 30.8 G/DL (ref 33–36)
MCV RBC AUTO: 94.7 FL (ref 80–94)
MONOCYTES # BLD AUTO: 0.54 X10(3)/MCL (ref 0.1–1.3)
MONOCYTES NFR BLD AUTO: 11.9 %
NEUTROPHILS # BLD AUTO: 2.42 X10(3)/MCL (ref 2.1–9.2)
NEUTROPHILS NFR BLD AUTO: 53.3 %
NRBC BLD AUTO-RTO: 0 %
PHOSPHATE SERPL-MCNC: 2.9 MG/DL (ref 2.3–4.7)
PLATELET # BLD AUTO: 414 X10(3)/MCL (ref 130–400)
PMV BLD AUTO: 8.3 FL (ref 7.4–10.4)
POCT GLUCOSE: 106 MG/DL (ref 70–110)
POCT GLUCOSE: 107 MG/DL (ref 70–110)
POTASSIUM SERPL-SCNC: 3.9 MMOL/L (ref 3.5–5.1)
PREALB SERPL-MCNC: 14.9 MG/DL (ref 16–42)
PROT SERPL-MCNC: 6.7 GM/DL (ref 5.8–7.6)
RBC # BLD AUTO: 3.39 X10(6)/MCL (ref 4.7–6.1)
SODIUM SERPL-SCNC: 141 MMOL/L (ref 136–145)
WBC # SPEC AUTO: 4.54 X10(3)/MCL (ref 4.5–11.5)

## 2024-04-18 PROCEDURE — 86140 C-REACTIVE PROTEIN: CPT | Performed by: NURSE PRACTITIONER

## 2024-04-18 PROCEDURE — 63600175 PHARM REV CODE 636 W HCPCS: Performed by: NURSE PRACTITIONER

## 2024-04-18 PROCEDURE — 25000003 PHARM REV CODE 250: Performed by: NURSE PRACTITIONER

## 2024-04-18 PROCEDURE — 25000003 PHARM REV CODE 250

## 2024-04-18 PROCEDURE — 92526 ORAL FUNCTION THERAPY: CPT

## 2024-04-18 PROCEDURE — 83735 ASSAY OF MAGNESIUM: CPT

## 2024-04-18 PROCEDURE — 85025 COMPLETE CBC W/AUTO DIFF WBC: CPT

## 2024-04-18 PROCEDURE — 84134 ASSAY OF PREALBUMIN: CPT | Performed by: NURSE PRACTITIONER

## 2024-04-18 PROCEDURE — 84100 ASSAY OF PHOSPHORUS: CPT

## 2024-04-18 PROCEDURE — 80053 COMPREHEN METABOLIC PANEL: CPT

## 2024-04-18 PROCEDURE — 11000001 HC ACUTE MED/SURG PRIVATE ROOM

## 2024-04-18 RX ORDER — PROPRANOLOL HYDROCHLORIDE 20 MG/1
20 TABLET ORAL ONCE
Status: COMPLETED | OUTPATIENT
Start: 2024-04-18 | End: 2024-04-18

## 2024-04-18 RX ORDER — ADHESIVE BANDAGE
30 BANDAGE TOPICAL ONCE
Status: COMPLETED | OUTPATIENT
Start: 2024-04-18 | End: 2024-04-18

## 2024-04-18 RX ADMIN — PROPRANOLOL HYDROCHLORIDE 20 MG: 20 TABLET ORAL at 05:04

## 2024-04-18 RX ADMIN — QUETIAPINE FUMARATE 25 MG: 25 TABLET ORAL at 08:04

## 2024-04-18 RX ADMIN — PROPRANOLOL HYDROCHLORIDE 20 MG: 20 TABLET ORAL at 08:04

## 2024-04-18 RX ADMIN — DOCUSATE SODIUM LIQUID 100 MG: 100 LIQUID ORAL at 09:04

## 2024-04-18 RX ADMIN — PROPRANOLOL HYDROCHLORIDE 20 MG: 20 TABLET ORAL at 09:04

## 2024-04-18 RX ADMIN — ENOXAPARIN SODIUM 40 MG: 40 INJECTION SUBCUTANEOUS at 08:04

## 2024-04-18 RX ADMIN — Medication 2 TABLET: at 09:04

## 2024-04-18 RX ADMIN — DOXAZOSIN 1 MG: 1 TABLET ORAL at 09:04

## 2024-04-18 RX ADMIN — METHOCARBAMOL 500 MG: 500 TABLET ORAL at 08:04

## 2024-04-18 RX ADMIN — MODAFINIL 200 MG: 100 TABLET ORAL at 09:04

## 2024-04-18 RX ADMIN — GUAIFENESIN AND DEXTROMETHORPHAN HYDROBROMIDE 1 TABLET: 30; 600 TABLET, EXTENDED RELEASE ORAL at 08:04

## 2024-04-18 RX ADMIN — ENOXAPARIN SODIUM 40 MG: 40 INJECTION SUBCUTANEOUS at 09:04

## 2024-04-18 RX ADMIN — MAGNESIUM HYDROXIDE 2400 MG: 400 SUSPENSION ORAL at 09:04

## 2024-04-18 RX ADMIN — LIDOCAINE 5% 2 PATCH: 700 PATCH TOPICAL at 02:04

## 2024-04-18 RX ADMIN — POLYETHYLENE GLYCOL 3350 17 G: 17 POWDER, FOR SOLUTION ORAL at 09:04

## 2024-04-18 RX ADMIN — THERA TABS 1 TABLET: TAB at 09:04

## 2024-04-18 RX ADMIN — GUAIFENESIN AND DEXTROMETHORPHAN HYDROBROMIDE 1 TABLET: 30; 600 TABLET, EXTENDED RELEASE ORAL at 09:04

## 2024-04-18 RX ADMIN — BISACODYL 10 MG: 10 SUPPOSITORY RECTAL at 05:04

## 2024-04-18 RX ADMIN — METHOCARBAMOL 500 MG: 500 TABLET ORAL at 02:04

## 2024-04-18 RX ADMIN — METHOCARBAMOL 500 MG: 500 TABLET ORAL at 09:04

## 2024-04-18 NOTE — PROGRESS NOTES
Brief Ortho note:  Follow up imaging reviewed for right medial clavicle fracture and right acromion fracture- both being managed non-operatively.   Patient now 3 weeks from injury  All fractures stable  OK to participate in therapy with RUE as pain and neurostatus allows   Follow up with Dr Harris on DC as needed with question/concerns

## 2024-04-18 NOTE — PLAN OF CARE
Medical records faxed to Jeaneth Saravia with [email or web address] ([email or web address])

## 2024-04-18 NOTE — PT/OT/SLP PROGRESS
[facility]  Speech Language Pathology Department  Therapy Progress Note    Patient Name:  Anayeli Taylor   MRN:  [id number]  Admitting Diagnosis: Traumatic subdural hematoma    Recommendations     General recommendations:  dysphagia therapy and cognitive-linguistic therapy  Communication strategies:  yes/no questions only, provide increased time to answer, and go to room if call light pushed    Discharge therapy intensity: Moderate Intensity Therapy   Barriers to safe discharge: severity of impairment and level of skilled assistance needed    Subjective     Patient awake and alert.    Pain/Comfort: 0/10  Spiritual/Cultural/Restoration Beliefs/Practices that affect care: no    Objective     Therapeutic PO Trials:  Consistency Amount Fed By Oral Symptoms Pharyngeal Symptoms   Ice chips 10 tsp SLP None Throat clear after swallow     Laryngeal/BOT exercises completed x20 given minimal-moderate assistance.     Orientation: oriented to self only; oriented to place with cues, unable to provide time or situation despite cues     Assessment     Pt continues to present with cognitive linguistic impairments negatively impacting safe, independent functioning. Skilled SLP intervention continues to be warranted at this time. SLP to continue POC.     Goals     Multidisciplinary Problems       SLP Goals          Problem: SLP    Goal Priority Disciplines Outcome   SLP Goal     SLP Ongoing, Progressing   Description: LTG: Pt will tolerate least restrictive PO diet with no clinical signs/sx - progressing  ST. Pt will tolerate ice chips with no signs/sx of aspiration - continue  2. Pt will tolerate puree solids with no signs/sx of aspiration - progressing  3. Tolerate thermal stimulation to the anterior faucial pillars x10 with 100% effortful swallow responses and delay less than 2 seconds - continue    LTG: complete basic cognitive tasks with supervision  STGs:  1.  Oriented x4 modified independent  2.   Attend to tx tasks x5 minutes without redirection  3.  Follow 1-step directions 90%  4.  Answer simple yes/no questions 90%                     Patient Education     Patient provided with verbal education regarding SLP POC.  Additional teaching is warranted.    Plan     Will continue to follow and tx as appropriate.    SLP Follow-Up:  Yes   Patient to be seen:  5 x/week   Plan of Care expires:  04/24/24  Plan of Care reviewed with:  patient     Time Tracking     SLP Treatment Date:   04/18/24  Speech Start Time:  1330  Speech Stop Time:  1350     Speech Total Time (min):  20 min    Billable minutes:  Speech/Language Therapy, 20 minutes     04/18/2024
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
## 2024-04-19 LAB
POCT GLUCOSE: 156 MG/DL (ref 70–110)
POCT GLUCOSE: 157 MG/DL (ref 70–110)
POCT GLUCOSE: 96 MG/DL (ref 70–110)

## 2024-04-19 PROCEDURE — 63600175 PHARM REV CODE 636 W HCPCS: Performed by: NURSE PRACTITIONER

## 2024-04-19 PROCEDURE — 25000003 PHARM REV CODE 250: Performed by: NURSE PRACTITIONER

## 2024-04-19 PROCEDURE — 11000001 HC ACUTE MED/SURG PRIVATE ROOM

## 2024-04-19 PROCEDURE — 25000003 PHARM REV CODE 250

## 2024-04-19 PROCEDURE — 92526 ORAL FUNCTION THERAPY: CPT

## 2024-04-19 RX ADMIN — DOXAZOSIN 1 MG: 1 TABLET ORAL at 09:04

## 2024-04-19 RX ADMIN — PROPRANOLOL HYDROCHLORIDE 20 MG: 20 TABLET ORAL at 09:04

## 2024-04-19 RX ADMIN — METHOCARBAMOL 500 MG: 500 TABLET ORAL at 03:04

## 2024-04-19 RX ADMIN — METHOCARBAMOL 500 MG: 500 TABLET ORAL at 09:04

## 2024-04-19 RX ADMIN — ENOXAPARIN SODIUM 40 MG: 40 INJECTION SUBCUTANEOUS at 09:04

## 2024-04-19 RX ADMIN — THERA TABS 1 TABLET: TAB at 09:04

## 2024-04-19 RX ADMIN — QUETIAPINE FUMARATE 25 MG: 25 TABLET ORAL at 09:04

## 2024-04-19 RX ADMIN — MODAFINIL 200 MG: 100 TABLET ORAL at 09:04

## 2024-04-19 RX ADMIN — GUAIFENESIN AND DEXTROMETHORPHAN HYDROBROMIDE 1 TABLET: 30; 600 TABLET, EXTENDED RELEASE ORAL at 09:04

## 2024-04-19 RX ADMIN — Medication 2 TABLET: at 09:04

## 2024-04-19 RX ADMIN — INSULIN ASPART 2 UNITS: 100 INJECTION, SOLUTION INTRAVENOUS; SUBCUTANEOUS at 11:04

## 2024-04-19 RX ADMIN — LIDOCAINE 5% 2 PATCH: 700 PATCH TOPICAL at 11:04

## 2024-04-19 NOTE — PT/OT/SLP PROGRESS
[facility]  Speech Language Pathology Department  Therapy Progress Note    Patient Name:  Anayeli Taylor   MRN:  [id number]  Admitting Diagnosis: Traumatic subdural hematoma    Recommendations     General recommendations:  dysphagia therapy and cognitive-linguistic therapy  Communication strategies:  yes/no questions only, provide increased time to answer, and go to room if call light pushed    Discharge therapy intensity: Moderate Intensity Therapy   Barriers to safe discharge: severity of impairment and level of skilled assistance needed    Subjective     Patient awake and alert.    Pain/Comfort: 0/10  Spiritual/Cultural/Zoroastrian Beliefs/Practices that affect care: no    Objective     Therapeutic PO Trials:  Consistency Amount Fed By Oral Symptoms Pharyngeal Symptoms   Ice chips 10 tsp SLP None None     Laryngeal exercises completed x10 given minimal-moderate assistance.     Patient with improved vocal quality following PO trials.    Assessment     Pt continues to present with cognitive linguistic impairments negatively impacting safe, independent functioning. Skilled SLP intervention continues to be warranted at this time. SLP to continue POC.     Goals     Multidisciplinary Problems       SLP Goals          Problem: SLP    Goal Priority Disciplines Outcome   SLP Goal     SLP Ongoing, Progressing   Description: LTG: Pt will tolerate least restrictive PO diet with no clinical signs/sx - progressing  ST. Pt will tolerate ice chips with no signs/sx of aspiration - continue  2. Pt will tolerate puree solids with no signs/sx of aspiration - progressing  3. Tolerate thermal stimulation to the anterior faucial pillars x10 with 100% effortful swallow responses and delay less than 2 seconds - continue    LTG: complete basic cognitive tasks with supervision  STGs:  1.  Oriented x4 modified independent  2.  Attend to tx tasks x5 minutes without redirection  3.  Follow 1-step directions  90%  4.  Answer simple yes/no questions 90%                     Patient Education     Patient provided with verbal education regarding SLP POC.  Additional teaching is warranted.    Plan     Will continue to follow and tx as appropriate.    SLP Follow-Up:  Yes   Patient to be seen:  5 x/week   Plan of Care expires:  04/24/24  Plan of Care reviewed with:  patient     Time Tracking     SLP Treatment Date:   04/19/24  Speech Start Time:  1320  Speech Stop Time:  1333     Speech Total Time (min):  13 min    Billable minutes:  Speech/Language Therapy, 13 minutes     04/19/2024
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
## 2024-04-19 NOTE — PROGRESS NOTES
Trauma Surgery   Progress Note  Admit Date: 3/26/2024  HD#24  POD#23 Days Post-Op    Subjective:   Interval history:  Afebrile. VSS. O2 sat stable on RA overnight. IV out.     Home Meds:   Current Outpatient Medications   Medication Instructions    HYDROcodone-acetaminophen (NORCO) 7.5-325 mg per tablet 1 tablet, Oral, Every 6 hours PRN    olmesartan-hydrochlorothiazide (BENICAR HCT) 20-12.5 mg per tablet 1 tablet, Oral, Daily    sildenafiL (VIAGRA) 50 mg, Oral, Daily PRN      Scheduled Meds:  Current Facility-Administered Medications   Medication Dose Route Frequency    calcium-vitamin D3  2 tablet Oral Daily    dextromethorphan-guaiFENesin  mg  1 tablet Oral BID    docusate  100 mg Oral BID    doxazosin  1 mg Oral Daily    enoxparin  40 mg Subcutaneous Q12H (prophylaxis, 0900/2100)    LIDOcaine  2 patch Transdermal Q24H    methocarbamoL  500 mg Oral TID    modafiniL  200 mg Oral Daily    multivitamin  1 tablet Oral Daily    polyethylene glycol  17 g Oral BID    propranoloL  20 mg Oral BID    QUEtiapine  25 mg Oral QHS     Continuous Infusions:  Current Facility-Administered Medications   Medication Dose Route Frequency Last Rate Last Admin     PRN Meds:    Current Facility-Administered Medications:     bisacodyL, 10 mg, Rectal, Daily PRN    dextrose 10%, 12.5 g, Intravenous, PRN    dextrose 10%, 25 g, Intravenous, PRN    glucagon (human recombinant), 1 mg, Intramuscular, PRN    hydrALAZINE, 10 mg, Intravenous, Q6H PRN    insulin aspart U-100, 0-5 Units, Subcutaneous, Q6H PRN    labetaloL, 10 mg, Intravenous, Q6H PRN    LORazepam, 1 mg, Oral, Q6H PRN    melatonin, 6 mg, Oral, Nightly PRN    ondansetron, 4 mg, Intravenous, Q6H PRN    oxyCODONE, 5 mg, Oral, Q4H PRN       Objective:     VITAL SIGNS: 24 HR MIN & MAX LAST   Temp  Min: 97.5 °F (36.4 °C)  Max: 98.5 °F (36.9 °C)  98.3 °F (36.8 °C)   BP  Min: 114/72  Max: 133/88  114/72    Pulse  Min: 80  Max: 139  86    Resp  Min: 17  Max: 18  17    SpO2  Min: 95  "%  Max: 98 %  97 %      HT: 5' 5.98" (167.6 cm)  WT: 55.2 kg (121 lb 11.1 oz)  BMI: 19.7     Intake/output:  Intake/Output - Last 3 Shifts         04/17 0700 04/18 0659 04/18 0700 04/19 0659 04/19 0700 04/20 0659    P.O. 120 480     Total Intake(mL/kg) 120 (2.2) 480 (8.7)     Net +120 +480            Urine Occurrence 2 x 2 x     Stool Occurrence 1 x 2 x             Intake/Output Summary (Last 24 hours) at 4/19/2024 0727  Last data filed at 4/18/2024 1253  Gross per 24 hour   Intake 480 ml   Output --   Net 480 ml           Lines/drains/airway:       Peripheral IV - Single Lumen 04/08/24 2245 20 G Left;Posterior Forearm (Active)   Site Assessment Clean;Dry;Intact 04/08/24 2000   Extremity Assessment Distal to IV No abnormal discoloration 04/08/24 2000   Line Status Capped 04/08/24 2000   Dressing Status Clean;Dry;Intact 04/08/24 2000   Dressing Intervention Integrity maintained 04/08/24 2000   Number of days: 0            NG/OG Tube 03/30/24 0300 16 Fr. Right nostril (Active)   Placement Check placement verified by distal tube length measurement 04/06/24 0400   Distal Tube Length (cm) 60 04/07/24 0800   Tolerance no signs/symptoms of discomfort 04/08/24 2000   Securement secured to nostril center 04/08/24 2000   Clamp Status/Tolerance unclamped;no abdominal discomfort 04/08/24 2000   Suction Setting/Drainage Method suction at the bedside 04/08/24 2000   Insertion Site Appearance no redness, warmth, tenderness, skin breakdown, drainage 04/08/24 2000   Drainage None 04/08/24 2000   Flush/Irrigation flushed w/;water 04/08/24 2000   Feeding Type continuous;by pump 04/08/24 2000   Feeding Action feeding continued 04/08/24 2000   Current Rate (mL/hr) 55 mL/hr 04/08/24 2000   Goal Rate (mL/hr) 55 mL/hr 04/08/24 2000   Intake (mL) 474 mL 04/06/24 0525   Water Bolus (mL) 75 mL 04/08/24 2000   Rate Formula Tube Feeding (mL/hr) 55 mL/hr 04/08/24 2000   Formula Name impact peptide 1.5 04/08/24 2000   Intake (mL) - Formula " "Tube Feeding 535 04/03/24 0559   Number of days: 10       Female External Urinary Catheter w/ Suction 04/07/24 0800 (Active)   Number of days: 1       Physical examination:  Gen: NAD, AAOx2, answering questions appropriately  HEENT: Normocephalic, scalp incision C/D/I. Facial contusions healing.   CV: RRR  Resp: NWOB, R clavicle deformity  Abd: S/NT/ND  Msk: moving all extremities spontaneously and purposefully  Neuro: Oriented to self and place, follows commands  Skin/wounds: Warm, dry    Labs:  Renal:  Recent Labs     04/16/24  0900 04/18/24  0356   BUN 12.8 12.6   CREATININE 0.68* 0.59*       No results for input(s): "LACTIC" in the last 72 hours.  FEN/GI:  Recent Labs     04/16/24  0900 04/18/24  0356    141   K 4.2 3.9   CO2 24 26   CALCIUM 8.5* 8.6*   MG  --  2.10   PHOS  --  2.9   ALBUMIN 2.6* 2.5*   BILITOT 0.5 0.5   AST 22 20   ALKPHOS 303* 294*   ALT 55 39       Heme:  Recent Labs     04/16/24  0900 04/18/24  0356   HGB 10.3* 9.9*   HCT 33.4* 32.1*   * 414*       ID:  Recent Labs     04/16/24  0900 04/18/24  0356   WBC 6.30 4.54       CBG:  Recent Labs     04/16/24  0900 04/18/24  0356   GLUCOSE 128* 87        Recent Labs     04/16/24  1103 04/16/24  1958 04/17/24  0601 04/17/24  1111 04/17/24  2117 04/18/24  1641 04/18/24  2034   POCTGLUCOSE 130* 109 99 130* 100 107 106      Cardiovascular:  No results for input(s): "TROPONINI", "CKTOTAL", "CKMB", "BNP" in the last 168 hours.    I have reviewed all pertinent lab results within the past 24 hours.    Imaging:     I have reviewed all pertinent imaging results/findings within the past 24 hours.    Micro/Path/Other:  Microbiology Results (last 7 days)       ** No results found for the last 168 hours. **           Pathology Results  (Last 7 days)      None             Problems list:  Active Problem List with Overview Notes    Diagnosis Date Noted    Delirium 04/15/2024    Hemopneumothorax on right 04/02/2024    Contusion of right lung 04/02/2024    " Adrenal hemorrhage 04/02/2024    Scalp laceration 04/02/2024    Elbow laceration, left, initial encounter 04/02/2024    Acute blood loss anemia 03/29/2024    Acute hypoxemic respiratory failure 03/27/2024    Traumatic subdural hematoma (SDH) 03/27/2024    Aspiration pneumonia due to gastric secretions 03/27/2024    Closed fracture of multiple ribs 03/27/2024    Closed nondisplaced fracture of sternal end of right clavicle 03/27/2024        Assessment & Plan:     SDH, SAH  - s/p emergent craniotomy 3/27/24  - Serial neuro q shift  - Keep HOB > 30 degrees  - Keppra completed 4/2/24  - Promote good sleep-wake cycles  - Provigil qAM  - Seroquel qHS  - Propranolol BID    R 3-9 rib fractures, R PTX/FLIP, R pulmonary contusions  - s/p acute respiratory failure s/t trauma, resolved - extubated 3/29/24  - BiPAP at night prn; has been tolerating RA without difficulty overnight  - Cefepmine x 7 days completed 4/10/24  - Encourage frequent IS  - Good pulmonary hygiene  - Nebs q6h    R clavicle fracture  - Evaluated by Orthopedics, non-op management  - Sling for comfort  - OK for ADLs, no overhead activity  - Pendulum ROM shoulder, full ROM distally    Dysphagia  - Continue working with ST  - Modified diet - Pureed with mildly thick liquids    Auto vs Scooter  - M/Th labs  - MMPC  - PT/OT  - SCDs & Lovenox 40 mg q12h for VTE ppx  - CM following for neuro rehab placement. Medically stable for placement once bed available.

## 2024-04-20 LAB
POCT GLUCOSE: 102 MG/DL (ref 70–110)
POCT GLUCOSE: 118 MG/DL (ref 70–110)
POCT GLUCOSE: 97 MG/DL (ref 70–110)
POCT GLUCOSE: 98 MG/DL (ref 70–110)

## 2024-04-20 PROCEDURE — 63600175 PHARM REV CODE 636 W HCPCS: Performed by: NURSE PRACTITIONER

## 2024-04-20 PROCEDURE — 11000001 HC ACUTE MED/SURG PRIVATE ROOM

## 2024-04-20 PROCEDURE — 25000003 PHARM REV CODE 250: Performed by: NURSE PRACTITIONER

## 2024-04-20 PROCEDURE — 25000003 PHARM REV CODE 250

## 2024-04-20 RX ADMIN — METHOCARBAMOL 500 MG: 500 TABLET ORAL at 08:04

## 2024-04-20 RX ADMIN — METHOCARBAMOL 500 MG: 500 TABLET ORAL at 04:04

## 2024-04-20 RX ADMIN — DOCUSATE SODIUM LIQUID 100 MG: 100 LIQUID ORAL at 08:04

## 2024-04-20 RX ADMIN — QUETIAPINE FUMARATE 25 MG: 25 TABLET ORAL at 08:04

## 2024-04-20 RX ADMIN — GUAIFENESIN AND DEXTROMETHORPHAN HYDROBROMIDE 1 TABLET: 30; 600 TABLET, EXTENDED RELEASE ORAL at 08:04

## 2024-04-20 RX ADMIN — PROPRANOLOL HYDROCHLORIDE 20 MG: 20 TABLET ORAL at 08:04

## 2024-04-20 RX ADMIN — POLYETHYLENE GLYCOL 3350 17 G: 17 POWDER, FOR SOLUTION ORAL at 09:04

## 2024-04-20 RX ADMIN — LIDOCAINE 5% 2 PATCH: 700 PATCH TOPICAL at 11:04

## 2024-04-20 RX ADMIN — MODAFINIL 200 MG: 100 TABLET ORAL at 09:04

## 2024-04-20 RX ADMIN — Medication 2 TABLET: at 09:04

## 2024-04-20 RX ADMIN — THERA TABS 1 TABLET: TAB at 09:04

## 2024-04-20 RX ADMIN — DOCUSATE SODIUM LIQUID 100 MG: 100 LIQUID ORAL at 09:04

## 2024-04-20 RX ADMIN — ENOXAPARIN SODIUM 40 MG: 40 INJECTION SUBCUTANEOUS at 08:04

## 2024-04-20 RX ADMIN — METHOCARBAMOL 500 MG: 500 TABLET ORAL at 09:04

## 2024-04-20 RX ADMIN — ENOXAPARIN SODIUM 40 MG: 40 INJECTION SUBCUTANEOUS at 09:04

## 2024-04-20 RX ADMIN — GUAIFENESIN AND DEXTROMETHORPHAN HYDROBROMIDE 1 TABLET: 30; 600 TABLET, EXTENDED RELEASE ORAL at 09:04

## 2024-04-20 RX ADMIN — PROPRANOLOL HYDROCHLORIDE 20 MG: 20 TABLET ORAL at 09:04

## 2024-04-20 RX ADMIN — DOXAZOSIN 1 MG: 1 TABLET ORAL at 09:04

## 2024-04-20 RX ADMIN — POLYETHYLENE GLYCOL 3350 17 G: 17 POWDER, FOR SOLUTION ORAL at 08:04

## 2024-04-20 NOTE — PROGRESS NOTES
Trauma Surgery   Progress Note  Admit Date: 3/26/2024  HD#25  POD#24 Days Post-Op    Subjective:   Interval history:  Afebrile. VSS. O2 sat stable on RA overnight. IV out.     Home Meds:   Current Outpatient Medications   Medication Instructions    HYDROcodone-acetaminophen (NORCO) 7.5-325 mg per tablet 1 tablet, Oral, Every 6 hours PRN    olmesartan-hydrochlorothiazide (BENICAR HCT) 20-12.5 mg per tablet 1 tablet, Oral, Daily    sildenafiL (VIAGRA) 50 mg, Oral, Daily PRN      Scheduled Meds:  Current Facility-Administered Medications   Medication Dose Route Frequency    calcium-vitamin D3  2 tablet Oral Daily    dextromethorphan-guaiFENesin  mg  1 tablet Oral BID    docusate  100 mg Oral BID    doxazosin  1 mg Oral Daily    enoxparin  40 mg Subcutaneous Q12H (prophylaxis, 0900/2100)    LIDOcaine  2 patch Transdermal Q24H    methocarbamoL  500 mg Oral TID    modafiniL  200 mg Oral Daily    multivitamin  1 tablet Oral Daily    polyethylene glycol  17 g Oral BID    propranoloL  20 mg Oral BID    QUEtiapine  25 mg Oral QHS     Continuous Infusions:  Current Facility-Administered Medications   Medication Dose Route Frequency Last Rate Last Admin     PRN Meds:    Current Facility-Administered Medications:     bisacodyL, 10 mg, Rectal, Daily PRN    dextrose 10%, 12.5 g, Intravenous, PRN    dextrose 10%, 25 g, Intravenous, PRN    glucagon (human recombinant), 1 mg, Intramuscular, PRN    hydrALAZINE, 10 mg, Intravenous, Q6H PRN    insulin aspart U-100, 0-5 Units, Subcutaneous, Q6H PRN    labetaloL, 10 mg, Intravenous, Q6H PRN    LORazepam, 1 mg, Oral, Q6H PRN    melatonin, 6 mg, Oral, Nightly PRN    ondansetron, 4 mg, Intravenous, Q6H PRN    oxyCODONE, 5 mg, Oral, Q4H PRN       Objective:     VITAL SIGNS: 24 HR MIN & MAX LAST   Temp  Min: 97.4 °F (36.3 °C)  Max: 98.5 °F (36.9 °C)  97.9 °F (36.6 °C)   BP  Min: 115/67  Max: 135/73  134/74    Pulse  Min: 80  Max: 105  85    Resp  Min: 16  Max: 18  16    SpO2  Min: 97  "%  Max: 98 %  97 %      HT: 5' 5.98" (167.6 cm)  WT: 55.2 kg (121 lb 11.1 oz)  BMI: 19.7     Intake/output:  Intake/Output - Last 3 Shifts         04/18 0700 04/19 0659 04/19 0700 04/20 0659    P.O. 480     Total Intake(mL/kg) 480 (8.7)     Urine (mL/kg/hr)  250 (0.2)    Total Output  250    Net +480 -250          Urine Occurrence 2 x     Stool Occurrence 2 x             Intake/Output Summary (Last 24 hours) at 4/20/2024 0641  Last data filed at 4/20/2024 0526  Gross per 24 hour   Intake --   Output 250 ml   Net -250 ml           Lines/drains/airway:       Peripheral IV - Single Lumen 04/08/24 2245 20 G Left;Posterior Forearm (Active)   Site Assessment Clean;Dry;Intact 04/08/24 2000   Extremity Assessment Distal to IV No abnormal discoloration 04/08/24 2000   Line Status Capped 04/08/24 2000   Dressing Status Clean;Dry;Intact 04/08/24 2000   Dressing Intervention Integrity maintained 04/08/24 2000   Number of days: 0            NG/OG Tube 03/30/24 0300 16 Fr. Right nostril (Active)   Placement Check placement verified by distal tube length measurement 04/06/24 0400   Distal Tube Length (cm) 60 04/07/24 0800   Tolerance no signs/symptoms of discomfort 04/08/24 2000   Securement secured to nostril center 04/08/24 2000   Clamp Status/Tolerance unclamped;no abdominal discomfort 04/08/24 2000   Suction Setting/Drainage Method suction at the bedside 04/08/24 2000   Insertion Site Appearance no redness, warmth, tenderness, skin breakdown, drainage 04/08/24 2000   Drainage None 04/08/24 2000   Flush/Irrigation flushed w/;water 04/08/24 2000   Feeding Type continuous;by pump 04/08/24 2000   Feeding Action feeding continued 04/08/24 2000   Current Rate (mL/hr) 55 mL/hr 04/08/24 2000   Goal Rate (mL/hr) 55 mL/hr 04/08/24 2000   Intake (mL) 474 mL 04/06/24 0525   Water Bolus (mL) 75 mL 04/08/24 2000   Rate Formula Tube Feeding (mL/hr) 55 mL/hr 04/08/24 2000   Formula Name impact peptide 1.5 04/08/24 2000   Intake (mL) - " "Formula Tube Feeding 535 04/03/24 0559   Number of days: 10       Female External Urinary Catheter w/ Suction 04/07/24 0800 (Active)   Number of days: 1       Physical examination:  Gen: NAD, AAOx2, answering questions appropriately  HEENT: Normocephalic, scalp incision C/D/I. Facial contusions healing.   CV: RRR  Resp: NWOB, R clavicle deformity  Abd: S/NT/ND  Msk: moving all extremities spontaneously and purposefully  Neuro: Oriented to self and place, follows commands  Skin/wounds: Warm, dry    Labs:  Renal:  Recent Labs     04/18/24  0356   BUN 12.6   CREATININE 0.59*       No results for input(s): "LACTIC" in the last 72 hours.  FEN/GI:  Recent Labs     04/18/24  0356      K 3.9   CO2 26   CALCIUM 8.6*   MG 2.10   PHOS 2.9   ALBUMIN 2.5*   BILITOT 0.5   AST 20   ALKPHOS 294*   ALT 39       Heme:  Recent Labs     04/18/24  0356   HGB 9.9*   HCT 32.1*   *       ID:  Recent Labs     04/18/24  0356   WBC 4.54       CBG:  Recent Labs     04/18/24  0356   GLUCOSE 87        Recent Labs     04/17/24  1111 04/17/24  2117 04/18/24  1641 04/18/24  2034 04/19/24  0559 04/19/24  1127 04/19/24  2150   POCTGLUCOSE 130* 100 107 106 96 156* 157*      Cardiovascular:  No results for input(s): "TROPONINI", "CKTOTAL", "CKMB", "BNP" in the last 168 hours.    I have reviewed all pertinent lab results within the past 24 hours.    Imaging:     I have reviewed all pertinent imaging results/findings within the past 24 hours.    Micro/Path/Other:  Microbiology Results (last 7 days)       ** No results found for the last 168 hours. **           Pathology Results  (Last 7 days)      None             Problems list:  Active Problem List with Overview Notes    Diagnosis Date Noted    Delirium 04/15/2024    Hemopneumothorax on right 04/02/2024    Contusion of right lung 04/02/2024    Adrenal hemorrhage 04/02/2024    Scalp laceration 04/02/2024    Elbow laceration, left, initial encounter 04/02/2024    Acute blood loss anemia " 03/29/2024    Acute hypoxemic respiratory failure 03/27/2024    Traumatic subdural hematoma (SDH) 03/27/2024    Aspiration pneumonia due to gastric secretions 03/27/2024    Closed fracture of multiple ribs 03/27/2024    Closed nondisplaced fracture of sternal end of right clavicle 03/27/2024        Assessment & Plan:     SDH, SAH  - s/p emergent craniotomy 3/27/24  - Serial neuro q shift  - Keep HOB > 30 degrees  - Keppra completed 4/2/24  - Promote good sleep-wake cycles  - Provigil qAM  - Seroquel qHS  - Propranolol BID    R 3-9 rib fractures, R PTX/FLIP, R pulmonary contusions  - s/p acute respiratory failure s/t trauma, resolved - extubated 3/29/24  - BiPAP at night prn; has been tolerating RA without difficulty overnight  - Cefepmine x 7 days completed 4/10/24  - Encourage frequent IS  - Good pulmonary hygiene  - Nebs q6h    R clavicle fracture  - Evaluated by Orthopedics, non-op management  - Sling for comfort  - OK for ADLs, no overhead activity  - Pendulum ROM shoulder, full ROM distally    Dysphagia  - Continue working with ST  - Modified diet - Pureed with mildly thick liquids    Auto vs Scooter  - M/Th labs  - MMPC  - PT/OT  - SCDs & Lovenox 40 mg q12h for VTE ppx  - CM following for neuro rehab placement. Medically stable for placement once bed available.

## 2024-04-21 LAB
POCT GLUCOSE: 101 MG/DL (ref 70–110)
POCT GLUCOSE: 109 MG/DL (ref 70–110)
POCT GLUCOSE: 123 MG/DL (ref 70–110)
POCT GLUCOSE: 151 MG/DL (ref 70–110)

## 2024-04-21 PROCEDURE — 25000003 PHARM REV CODE 250: Performed by: NURSE PRACTITIONER

## 2024-04-21 PROCEDURE — 11000001 HC ACUTE MED/SURG PRIVATE ROOM

## 2024-04-21 PROCEDURE — 63600175 PHARM REV CODE 636 W HCPCS: Performed by: NURSE PRACTITIONER

## 2024-04-21 PROCEDURE — 25000003 PHARM REV CODE 250

## 2024-04-21 RX ADMIN — THERA TABS 1 TABLET: TAB at 09:04

## 2024-04-21 RX ADMIN — METHOCARBAMOL 500 MG: 500 TABLET ORAL at 09:04

## 2024-04-21 RX ADMIN — DOCUSATE SODIUM LIQUID 100 MG: 100 LIQUID ORAL at 08:04

## 2024-04-21 RX ADMIN — GUAIFENESIN AND DEXTROMETHORPHAN HYDROBROMIDE 1 TABLET: 30; 600 TABLET, EXTENDED RELEASE ORAL at 08:04

## 2024-04-21 RX ADMIN — DOXAZOSIN 1 MG: 1 TABLET ORAL at 09:04

## 2024-04-21 RX ADMIN — GUAIFENESIN AND DEXTROMETHORPHAN HYDROBROMIDE 1 TABLET: 30; 600 TABLET, EXTENDED RELEASE ORAL at 09:04

## 2024-04-21 RX ADMIN — Medication 2 TABLET: at 09:04

## 2024-04-21 RX ADMIN — POLYETHYLENE GLYCOL 3350 17 G: 17 POWDER, FOR SOLUTION ORAL at 09:04

## 2024-04-21 RX ADMIN — PROPRANOLOL HYDROCHLORIDE 20 MG: 20 TABLET ORAL at 08:04

## 2024-04-21 RX ADMIN — METHOCARBAMOL 500 MG: 500 TABLET ORAL at 02:04

## 2024-04-21 RX ADMIN — METHOCARBAMOL 500 MG: 500 TABLET ORAL at 08:04

## 2024-04-21 RX ADMIN — QUETIAPINE FUMARATE 25 MG: 25 TABLET ORAL at 08:04

## 2024-04-21 RX ADMIN — ENOXAPARIN SODIUM 40 MG: 40 INJECTION SUBCUTANEOUS at 09:04

## 2024-04-21 RX ADMIN — ENOXAPARIN SODIUM 40 MG: 40 INJECTION SUBCUTANEOUS at 08:04

## 2024-04-21 RX ADMIN — MODAFINIL 200 MG: 100 TABLET ORAL at 09:04

## 2024-04-21 RX ADMIN — PROPRANOLOL HYDROCHLORIDE 20 MG: 20 TABLET ORAL at 09:04

## 2024-04-21 RX ADMIN — POLYETHYLENE GLYCOL 3350 17 G: 17 POWDER, FOR SOLUTION ORAL at 08:04

## 2024-04-21 RX ADMIN — LIDOCAINE 5% 2 PATCH: 700 PATCH TOPICAL at 11:04

## 2024-04-21 RX ADMIN — DOCUSATE SODIUM LIQUID 100 MG: 100 LIQUID ORAL at 09:04

## 2024-04-21 NOTE — PROCEDURES
Date: 4/5/24    Indication: Pleural effusion  Resident: Makayla Connors  Attending: Dr Randy Travis  A time-out was completed verifying correct patient, procedure, site, positioning, and special equipment if applicable. The patient was positioned appropriately for chest tube placement. The patients right chest was prepped and draped in sterile fashion. 1% Lidocaine was used to anesthetize the surrounding skin area. A 2 cm skin incision was made in the mid-axillary line at the inframammarycrease. Utilizing blunt dissection a subcutaneous tunnel was created cephalad just adjacent to the superior rib. The pleural space was entered bluntly and gush of  serosang fluid was observed. A finger was inserted into the pleural space to check for anatomy and guide tube insertion. A 28Ft horacostomy tube was inserted using a Janet clamp and positioned appropriately. The chest tube was sutured securely to the skin and a sterile dressing applied. A pleurevac was attached to the chest tube and a chest x-ray obtained. Dr Travis was present for the entire procedure.  Estimated Blood Loss: 5cc  The patient tolerated the procedure well and there were no complications.

## 2024-04-21 NOTE — PROGRESS NOTES
"   Trauma Surgery   Progress Note  Admit Date: 3/26/2024  HD#26  POD#25 Days Post-Op    Subjective:   Interval history:  No change.     Home Meds:   Current Outpatient Medications   Medication Instructions    HYDROcodone-acetaminophen (NORCO) 7.5-325 mg per tablet 1 tablet, Oral, Every 6 hours PRN    olmesartan-hydrochlorothiazide (BENICAR HCT) 20-12.5 mg per tablet 1 tablet, Oral, Daily    sildenafiL (VIAGRA) 50 mg, Oral, Daily PRN      Scheduled Meds:  Current Facility-Administered Medications   Medication Dose Route Frequency    calcium-vitamin D3  2 tablet Oral Daily    dextromethorphan-guaiFENesin  mg  1 tablet Oral BID    docusate  100 mg Oral BID    doxazosin  1 mg Oral Daily    enoxparin  40 mg Subcutaneous Q12H (prophylaxis, 0900/2100)    LIDOcaine  2 patch Transdermal Q24H    methocarbamoL  500 mg Oral TID    modafiniL  200 mg Oral Daily    multivitamin  1 tablet Oral Daily    polyethylene glycol  17 g Oral BID    propranoloL  20 mg Oral BID    QUEtiapine  25 mg Oral QHS     Continuous Infusions:  Current Facility-Administered Medications   Medication Dose Route Frequency Last Rate Last Admin     PRN Meds:    Current Facility-Administered Medications:     bisacodyL, 10 mg, Rectal, Daily PRN    dextrose 10%, 12.5 g, Intravenous, PRN    dextrose 10%, 25 g, Intravenous, PRN    glucagon (human recombinant), 1 mg, Intramuscular, PRN    hydrALAZINE, 10 mg, Intravenous, Q6H PRN    insulin aspart U-100, 0-5 Units, Subcutaneous, Q6H PRN    labetaloL, 10 mg, Intravenous, Q6H PRN    LORazepam, 1 mg, Oral, Q6H PRN    melatonin, 6 mg, Oral, Nightly PRN    ondansetron, 4 mg, Intravenous, Q6H PRN    oxyCODONE, 5 mg, Oral, Q4H PRN       Objective:     VITAL SIGNS: 24 HR MIN & MAX LAST   Temp  Min: 97.3 °F (36.3 °C)  Max: 98.4 °F (36.9 °C)  98.2 °F (36.8 °C)   BP  Min: 108/69  Max: 127/75  127/75    Pulse  Min: 73  Max: 89  89    Resp  Min: 16  Max: 18  18    SpO2  Min: 96 %  Max: 98 %  96 %      HT: 5' 5.98" (167.6 " cm)  WT: 55.2 kg (121 lb 11.1 oz)  BMI: 19.7     Intake/output:  Intake/Output - Last 3 Shifts         04/19 0700 04/20 0659 04/20 0700 04/21 0659 04/21 0700 04/22 0659    P.O.       Total Intake(mL/kg)       Urine (mL/kg/hr) 250 (0.2)      Total Output 250      Net -250             Urine Occurrence  2 x     Stool Occurrence  0 x           No intake or output data in the 24 hours ending 04/21/24 1129          Lines/drains/airway:       Peripheral IV - Single Lumen 04/08/24 2245 20 G Left;Posterior Forearm (Active)   Site Assessment Clean;Dry;Intact 04/08/24 2000   Extremity Assessment Distal to IV No abnormal discoloration 04/08/24 2000   Line Status Capped 04/08/24 2000   Dressing Status Clean;Dry;Intact 04/08/24 2000   Dressing Intervention Integrity maintained 04/08/24 2000   Number of days: 0            NG/OG Tube 03/30/24 0300 16 Fr. Right nostril (Active)   Placement Check placement verified by distal tube length measurement 04/06/24 0400   Distal Tube Length (cm) 60 04/07/24 0800   Tolerance no signs/symptoms of discomfort 04/08/24 2000   Securement secured to nostril center 04/08/24 2000   Clamp Status/Tolerance unclamped;no abdominal discomfort 04/08/24 2000   Suction Setting/Drainage Method suction at the bedside 04/08/24 2000   Insertion Site Appearance no redness, warmth, tenderness, skin breakdown, drainage 04/08/24 2000   Drainage None 04/08/24 2000   Flush/Irrigation flushed w/;water 04/08/24 2000   Feeding Type continuous;by pump 04/08/24 2000   Feeding Action feeding continued 04/08/24 2000   Current Rate (mL/hr) 55 mL/hr 04/08/24 2000   Goal Rate (mL/hr) 55 mL/hr 04/08/24 2000   Intake (mL) 474 mL 04/06/24 0525   Water Bolus (mL) 75 mL 04/08/24 2000   Rate Formula Tube Feeding (mL/hr) 55 mL/hr 04/08/24 2000   Formula Name impact peptide 1.5 04/08/24 2000   Intake (mL) - Formula Tube Feeding 535 04/03/24 0559   Number of days: 10       Female External Urinary Catheter w/ Suction 04/07/24 0800  "(Active)   Number of days: 1       Physical examination:  Gen: NAD, AAOx2, answering questions appropriately  HEENT: Normocephalic, scalp incision C/D/I. Facial contusions healing.   CV: RRR  Resp: NWOB, R clavicle deformity  Abd: S/NT/ND  Msk: moving all extremities spontaneously and purposefully  Neuro: Oriented to self and place, follows commands  Skin/wounds: Warm, dry    Labs:  Renal:  No results for input(s): "BUN", "CREATININE" in the last 72 hours.      No results for input(s): "LACTIC" in the last 72 hours.  FEN/GI:  No results for input(s): "NA", "K", "CL", "CO2", "CALCIUM", "MG", "PHOS", "PROT", "ALBUMIN", "BILITOT", "AST", "ALKPHOS", "ALT" in the last 72 hours.      Heme:  No results for input(s): "HGB", "HCT", "PLT", "PTT", "INR" in the last 72 hours.      ID:  No results for input(s): "WBC" in the last 72 hours.      CBG:  No results for input(s): "GLUCOSE" in the last 72 hours.       Recent Labs     04/19/24  0559 04/19/24  1127 04/19/24  2150 04/20/24  0515 04/20/24  1124 04/20/24  1632 04/20/24  2057 04/21/24  0538   POCTGLUCOSE 96 156* 157* 98 118* 102 97 101      Cardiovascular:  No results for input(s): "TROPONINI", "CKTOTAL", "CKMB", "BNP" in the last 168 hours.    I have reviewed all pertinent lab results within the past 24 hours.    Imaging:     I have reviewed all pertinent imaging results/findings within the past 24 hours.    Micro/Path/Other:  Microbiology Results (last 7 days)       ** No results found for the last 168 hours. **           Pathology Results  (Last 7 days)      None             Problems list:  Active Problem List with Overview Notes    Diagnosis Date Noted    Delirium 04/15/2024    Hemopneumothorax on right 04/02/2024    Contusion of right lung 04/02/2024    Adrenal hemorrhage 04/02/2024    Scalp laceration 04/02/2024    Elbow laceration, left, initial encounter 04/02/2024    Acute blood loss anemia 03/29/2024    Acute hypoxemic respiratory failure 03/27/2024    Traumatic " subdural hematoma (SDH) 03/27/2024    Aspiration pneumonia due to gastric secretions 03/27/2024    Closed fracture of multiple ribs 03/27/2024    Closed nondisplaced fracture of sternal end of right clavicle 03/27/2024        Assessment & Plan:     SDH, SAH  - s/p emergent craniotomy 3/27/24  - Serial neuro q shift  - Keep HOB > 30 degrees  - Keppra completed 4/2/24  - Promote good sleep-wake cycles  - Provigil qAM  - Seroquel qHS  - Propranolol BID    R 3-9 rib fractures, R PTX/FLIP, R pulmonary contusions  - s/p acute respiratory failure s/t trauma, resolved - extubated 3/29/24  - BiPAP at night prn; has been tolerating RA without difficulty overnight  - Cefepmine x 7 days completed 4/10/24  - Encourage frequent IS  - Good pulmonary hygiene  - Nebs q6h    R clavicle fracture  - Evaluated by Orthopedics, non-op management  - Sling for comfort  - OK for ADLs, no overhead activity  - Pendulum ROM shoulder, full ROM distally    Dysphagia  - Continue working with ST  - Modified diet - Pureed with mildly thick liquids    Auto vs Scooter  - M/Th labs  - MMPC  - PT/OT  - SCDs & Lovenox 40 mg q12h for VTE ppx  - CM following for neuro rehab placement. Medically stable for placement once bed available.

## 2024-04-22 LAB
ALBUMIN SERPL-MCNC: 2.6 G/DL (ref 3.4–4.8)
ALBUMIN/GLOB SERPL: 0.7 RATIO (ref 1.1–2)
ALP SERPL-CCNC: 285 UNIT/L (ref 40–150)
ALT SERPL-CCNC: 41 UNIT/L (ref 0–55)
AST SERPL-CCNC: 26 UNIT/L (ref 5–34)
BASOPHILS # BLD AUTO: 0.02 X10(3)/MCL
BASOPHILS NFR BLD AUTO: 0.5 %
BILIRUB SERPL-MCNC: 0.3 MG/DL
BUN SERPL-MCNC: 11.3 MG/DL (ref 8.4–25.7)
CALCIUM SERPL-MCNC: 8.6 MG/DL (ref 8.8–10)
CHLORIDE SERPL-SCNC: 107 MMOL/L (ref 98–107)
CO2 SERPL-SCNC: 25 MMOL/L (ref 23–31)
CREAT SERPL-MCNC: 0.6 MG/DL (ref 0.73–1.18)
CRP SERPL-MCNC: 18.2 MG/L
EOSINOPHIL # BLD AUTO: 0.24 X10(3)/MCL (ref 0–0.9)
EOSINOPHIL NFR BLD AUTO: 6.2 %
ERYTHROCYTE [DISTWIDTH] IN BLOOD BY AUTOMATED COUNT: 15.7 % (ref 11.5–17)
GFR SERPLBLD CREATININE-BSD FMLA CKD-EPI: >60 MLS/MIN/1.73/M2
GLOBULIN SER-MCNC: 3.6 GM/DL (ref 2.4–3.5)
GLUCOSE SERPL-MCNC: 106 MG/DL (ref 82–115)
HCT VFR BLD AUTO: 33.3 % (ref 42–52)
HGB BLD-MCNC: 10.3 G/DL (ref 14–18)
IMM GRANULOCYTES # BLD AUTO: 0 X10(3)/MCL (ref 0–0.04)
IMM GRANULOCYTES NFR BLD AUTO: 0 %
LYMPHOCYTES # BLD AUTO: 1.68 X10(3)/MCL (ref 0.6–4.6)
LYMPHOCYTES NFR BLD AUTO: 43.4 %
MAGNESIUM SERPL-MCNC: 2 MG/DL (ref 1.6–2.6)
MCH RBC QN AUTO: 28.6 PG (ref 27–31)
MCHC RBC AUTO-ENTMCNC: 30.9 G/DL (ref 33–36)
MCV RBC AUTO: 92.5 FL (ref 80–94)
MONOCYTES # BLD AUTO: 0.41 X10(3)/MCL (ref 0.1–1.3)
MONOCYTES NFR BLD AUTO: 10.6 %
NEUTROPHILS # BLD AUTO: 1.52 X10(3)/MCL (ref 2.1–9.2)
NEUTROPHILS NFR BLD AUTO: 39.3 %
NRBC BLD AUTO-RTO: 0 %
PHOSPHATE SERPL-MCNC: 2.5 MG/DL (ref 2.3–4.7)
PLATELET # BLD AUTO: 224 X10(3)/MCL (ref 130–400)
PMV BLD AUTO: 8.3 FL (ref 7.4–10.4)
POCT GLUCOSE: 103 MG/DL (ref 70–110)
POCT GLUCOSE: 110 MG/DL (ref 70–110)
POCT GLUCOSE: 137 MG/DL (ref 70–110)
POTASSIUM SERPL-SCNC: 4.1 MMOL/L (ref 3.5–5.1)
PREALB SERPL-MCNC: 15.6 MG/DL (ref 16–42)
PROT SERPL-MCNC: 6.2 GM/DL (ref 5.8–7.6)
RBC # BLD AUTO: 3.6 X10(6)/MCL (ref 4.7–6.1)
SODIUM SERPL-SCNC: 141 MMOL/L (ref 136–145)
WBC # SPEC AUTO: 3.87 X10(3)/MCL (ref 4.5–11.5)

## 2024-04-22 PROCEDURE — 63600175 PHARM REV CODE 636 W HCPCS: Performed by: NURSE PRACTITIONER

## 2024-04-22 PROCEDURE — 85025 COMPLETE CBC W/AUTO DIFF WBC: CPT

## 2024-04-22 PROCEDURE — 25000003 PHARM REV CODE 250: Performed by: NURSE PRACTITIONER

## 2024-04-22 PROCEDURE — 84134 ASSAY OF PREALBUMIN: CPT | Performed by: NURSE PRACTITIONER

## 2024-04-22 PROCEDURE — 99232 SBSQ HOSP IP/OBS MODERATE 35: CPT | Mod: ,,, | Performed by: SURGERY

## 2024-04-22 PROCEDURE — 92526 ORAL FUNCTION THERAPY: CPT

## 2024-04-22 PROCEDURE — 25000003 PHARM REV CODE 250

## 2024-04-22 PROCEDURE — 97530 THERAPEUTIC ACTIVITIES: CPT

## 2024-04-22 PROCEDURE — 80053 COMPREHEN METABOLIC PANEL: CPT

## 2024-04-22 PROCEDURE — 97110 THERAPEUTIC EXERCISES: CPT

## 2024-04-22 PROCEDURE — 83735 ASSAY OF MAGNESIUM: CPT

## 2024-04-22 PROCEDURE — 84100 ASSAY OF PHOSPHORUS: CPT

## 2024-04-22 PROCEDURE — 11000001 HC ACUTE MED/SURG PRIVATE ROOM

## 2024-04-22 PROCEDURE — 86140 C-REACTIVE PROTEIN: CPT | Performed by: NURSE PRACTITIONER

## 2024-04-22 RX ADMIN — ENOXAPARIN SODIUM 40 MG: 40 INJECTION SUBCUTANEOUS at 10:04

## 2024-04-22 RX ADMIN — Medication 2 TABLET: at 08:04

## 2024-04-22 RX ADMIN — DOCUSATE SODIUM LIQUID 100 MG: 100 LIQUID ORAL at 08:04

## 2024-04-22 RX ADMIN — MODAFINIL 200 MG: 100 TABLET ORAL at 08:04

## 2024-04-22 RX ADMIN — GUAIFENESIN AND DEXTROMETHORPHAN HYDROBROMIDE 1 TABLET: 30; 600 TABLET, EXTENDED RELEASE ORAL at 08:04

## 2024-04-22 RX ADMIN — QUETIAPINE FUMARATE 25 MG: 25 TABLET ORAL at 10:04

## 2024-04-22 RX ADMIN — OXYCODONE HYDROCHLORIDE 5 MG: 5 TABLET ORAL at 02:04

## 2024-04-22 RX ADMIN — LIDOCAINE 5% 2 PATCH: 700 PATCH TOPICAL at 11:04

## 2024-04-22 RX ADMIN — METHOCARBAMOL 500 MG: 500 TABLET ORAL at 10:04

## 2024-04-22 RX ADMIN — METHOCARBAMOL 500 MG: 500 TABLET ORAL at 08:04

## 2024-04-22 RX ADMIN — GUAIFENESIN AND DEXTROMETHORPHAN HYDROBROMIDE 1 TABLET: 30; 600 TABLET, EXTENDED RELEASE ORAL at 10:04

## 2024-04-22 RX ADMIN — PROPRANOLOL HYDROCHLORIDE 20 MG: 20 TABLET ORAL at 10:04

## 2024-04-22 RX ADMIN — THERA TABS 1 TABLET: TAB at 08:04

## 2024-04-22 RX ADMIN — METHOCARBAMOL 500 MG: 500 TABLET ORAL at 02:04

## 2024-04-22 RX ADMIN — DOXAZOSIN 1 MG: 1 TABLET ORAL at 08:04

## 2024-04-22 RX ADMIN — PROPRANOLOL HYDROCHLORIDE 20 MG: 20 TABLET ORAL at 08:04

## 2024-04-22 RX ADMIN — POLYETHYLENE GLYCOL 3350 17 G: 17 POWDER, FOR SOLUTION ORAL at 08:04

## 2024-04-22 RX ADMIN — ENOXAPARIN SODIUM 40 MG: 40 INJECTION SUBCUTANEOUS at 08:04

## 2024-04-22 NOTE — PT/OT/SLP PROGRESS
"Physical Therapy Treatment    Patient Name:  Anayeli Taylor   MRN:  [id number]    Recommendations:     Discharge therapy intensity: Moderate Intensity Therapy   Discharge Equipment Recommendations: none  Barriers to discharge: Impaired mobility    Assessment:     Anayeli Taylor is a 73 y.o. male admitted with a medical diagnosis of SDH.  He presents with the following impairments/functional limitations: weakness, impaired endurance, impaired self care skills, impaired functional mobility, gait instability, impaired balance, decreased upper extremity function, decreased safety awareness, pain, decreased ROM. Patient tolerated session much better today. Performed AAROM to BLE's. He mobilized OOB and was able to stand for ~1 minute each time. Standing balance was much better. Recommending MOD intensity therapy at discharge. Continue to progress patient as tolerated.     Rehab Prognosis: Good; patient would benefit from acute skilled PT services to address these deficits and reach maximum level of function.    Recent Surgery: Procedure(s) (LRB):  CRANIOTOMY, FOR SUBDURAL HEMATOMA EVACUATION-RIGHT (Right)  Insertion,central venous access device (Right) 26 Days Post-Op    Plan:     During this hospitalization, patient would benefit from acute PT services 5 x/week to address the identified rehab impairments via gait training, therapeutic activities, therapeutic exercises, neuromuscular re-education and progress toward the following goals:    Plan of Care Expires:  04/30/24    Subjective     Chief Complaint: pain  Patient/Family Comments/goals: none  Pain/Comfort:  Pain Rating 1:  (Did not give rating)  Location 1:  ("whole body")  Pain Addressed 1: Distraction, Reposition      Objective:     Communicated with nurse prior to session.  Patient found supine with peripheral IV, pressure relief boots, SCD, PureWick, telemetry upon PT entry to room.     General Precautions: Standard, fall  Orthopedic Precautions: RUE non weight " bearing (OK for ADLS, no OH activity)  Braces: UE Sling  Respiratory Status: Room air  Skin Integrity: Visible skin intact      Functional Mobility:  Bed Mobility:  Rolling Left:  minimum assistance  Rolling Right: moderate assistance  Supine to Sit: maximal assistance  Sit to Supine: maximal assistance  Transfers:  Sit to Stand:  moderate assistance and of 2 persons with hand-held assist  Sitting Balance: Initially poor (MAX A to remain upright), but progressed to fair (SBA)    Therapeutic Exercises:  AAROM bilateral heel slides, SLR, AP, Hip ABD/ADD. 2x20 reps     Education Provided:  Role and goals of PT, transfer training, bed mobility,  balance training, safety awareness, assistive device, strengthening exercises, and importance of participating in PT to return to PLOF.    Patient left HOB elevated with all lines intact, call button in reach, and family present    GOALS:   Multidisciplinary Problems       Physical Therapy Goals          Problem: Physical Therapy    Goal Priority Disciplines Outcome Goal Variances Interventions   Physical Therapy Goal     PT, PT/OT Ongoing, Progressing     Description: Goals to be met by: 5/10/24     Patient will increase functional independence with mobility by performin. Supine to sit with MInimal Assistance  2. Sit to supine with MInimal Assistance  3. Rolling to Left and Right with Minimal Assistance.  4. Sit to stand transfer with Minimal Assistance  5. Bed to chair transfer with Minimal Assistance using LRAD  6. Sitting at edge of bed x10 minutes with Stand-by Assistance                         Time Tracking:     PT Received On: 24  PT Start Time: 1115     PT Stop Time: 1140  PT Total Time (min): 25 min     Billable Minutes: Therapeutic Activity 13 minutes and Therapeutic Exercise 12 minutes    Treatment Type: Treatment  PT/PTA: PT     Number of PTA visits since last PT visit: 3     2024

## 2024-04-22 NOTE — PT/OT/SLP PROGRESS
Physical Therapy Treatment    Patient Name:  Anayeli Taylor   MRN:  [id number]    Patient tolerating therapy much better. Updating POC to 5x/week.

## 2024-04-22 NOTE — PROGRESS NOTES
Trauma Surgery   Progress Note  Admit Date: 3/26/2024  HD#27  POD#26 Days Post-Op    Subjective:   Interval history:  No change. Labs reviewed.     Home Meds:   Current Outpatient Medications   Medication Instructions    HYDROcodone-acetaminophen (NORCO) 7.5-325 mg per tablet 1 tablet, Oral, Every 6 hours PRN    olmesartan-hydrochlorothiazide (BENICAR HCT) 20-12.5 mg per tablet 1 tablet, Oral, Daily    sildenafiL (VIAGRA) 50 mg, Oral, Daily PRN      Scheduled Meds:  Current Facility-Administered Medications   Medication Dose Route Frequency    calcium-vitamin D3  2 tablet Oral Daily    dextromethorphan-guaiFENesin  mg  1 tablet Oral BID    docusate  100 mg Oral BID    doxazosin  1 mg Oral Daily    enoxparin  40 mg Subcutaneous Q12H (prophylaxis, 0900/2100)    LIDOcaine  2 patch Transdermal Q24H    methocarbamoL  500 mg Oral TID    modafiniL  200 mg Oral Daily    multivitamin  1 tablet Oral Daily    polyethylene glycol  17 g Oral BID    propranoloL  20 mg Oral BID    QUEtiapine  25 mg Oral QHS     Continuous Infusions:  Current Facility-Administered Medications   Medication Dose Route Frequency Last Rate Last Admin     PRN Meds:    Current Facility-Administered Medications:     bisacodyL, 10 mg, Rectal, Daily PRN    dextrose 10%, 12.5 g, Intravenous, PRN    dextrose 10%, 25 g, Intravenous, PRN    glucagon (human recombinant), 1 mg, Intramuscular, PRN    hydrALAZINE, 10 mg, Intravenous, Q6H PRN    insulin aspart U-100, 0-5 Units, Subcutaneous, Q6H PRN    labetaloL, 10 mg, Intravenous, Q6H PRN    LORazepam, 1 mg, Oral, Q6H PRN    melatonin, 6 mg, Oral, Nightly PRN    ondansetron, 4 mg, Intravenous, Q6H PRN    oxyCODONE, 5 mg, Oral, Q4H PRN       Objective:     VITAL SIGNS: 24 HR MIN & MAX LAST   Temp  Min: 98.2 °F (36.8 °C)  Max: 98.6 °F (37 °C)  98.6 °F (37 °C)   BP  Min: 113/69  Max: 132/73  132/73    Pulse  Min: 68  Max: 127  68    Resp  Min: 16  Max: 18  18    SpO2  Min: 96 %  Max: 98 %  98 %      HT: 5'  "5.98" (167.6 cm)  WT: 55.2 kg (121 lb 11.1 oz)  BMI: 19.7     Intake/output:  Intake/Output - Last 3 Shifts         04/20 0700 04/21 0659 04/21 0700 04/22 0659          Urine Occurrence 2 x     Stool Occurrence 0 x           No intake or output data in the 24 hours ending 04/22/24 0626          Lines/drains/airway:       Peripheral IV - Single Lumen 04/08/24 2245 20 G Left;Posterior Forearm (Active)   Site Assessment Clean;Dry;Intact 04/08/24 2000   Extremity Assessment Distal to IV No abnormal discoloration 04/08/24 2000   Line Status Capped 04/08/24 2000   Dressing Status Clean;Dry;Intact 04/08/24 2000   Dressing Intervention Integrity maintained 04/08/24 2000   Number of days: 0            NG/OG Tube 03/30/24 0300 16 Fr. Right nostril (Active)   Placement Check placement verified by distal tube length measurement 04/06/24 0400   Distal Tube Length (cm) 60 04/07/24 0800   Tolerance no signs/symptoms of discomfort 04/08/24 2000   Securement secured to nostril center 04/08/24 2000   Clamp Status/Tolerance unclamped;no abdominal discomfort 04/08/24 2000   Suction Setting/Drainage Method suction at the bedside 04/08/24 2000   Insertion Site Appearance no redness, warmth, tenderness, skin breakdown, drainage 04/08/24 2000   Drainage None 04/08/24 2000   Flush/Irrigation flushed w/;water 04/08/24 2000   Feeding Type continuous;by pump 04/08/24 2000   Feeding Action feeding continued 04/08/24 2000   Current Rate (mL/hr) 55 mL/hr 04/08/24 2000   Goal Rate (mL/hr) 55 mL/hr 04/08/24 2000   Intake (mL) 474 mL 04/06/24 0525   Water Bolus (mL) 75 mL 04/08/24 2000   Rate Formula Tube Feeding (mL/hr) 55 mL/hr 04/08/24 2000   Formula Name impact peptide 1.5 04/08/24 2000   Intake (mL) - Formula Tube Feeding 535 04/03/24 0559   Number of days: 10       Female External Urinary Catheter w/ Suction 04/07/24 0800 (Active)   Number of days: 1       Physical examination:  Gen: NAD, AAOx2, answering questions appropriately  HEENT: " "Normocephalic, scalp incision C/D/I. Facial contusions healing.   CV: RRR  Resp: NWOB, R clavicle deformity  Abd: S/NT/ND  Msk: moving all extremities spontaneously and purposefully  Neuro: Oriented to self and place, follows commands  Skin/wounds: Warm, dry    Labs:  Renal:  Recent Labs     04/22/24 0457   BUN 11.3   CREATININE 0.60*         No results for input(s): "LACTIC" in the last 72 hours.  FEN/GI:  Recent Labs     04/22/24 0457      K 4.1   CO2 25   CALCIUM 8.6*   MG 2.00   PHOS 2.5   ALBUMIN 2.6*   BILITOT 0.3   AST 26   ALKPHOS 285*   ALT 41         Heme:  Recent Labs     04/22/24 0457   HGB 10.3*   HCT 33.3*            ID:  Recent Labs     04/22/24 0457   WBC 3.87*         CBG:  Recent Labs     04/22/24  0457   GLUCOSE 106          Recent Labs     04/20/24  1124 04/20/24  1632 04/20/24 2057 04/21/24  0538 04/21/24  1125 04/21/24  1631 04/21/24  2037 04/22/24  0552   POCTGLUCOSE 118* 102 97 101 151* 123* 109 103      Cardiovascular:  No results for input(s): "TROPONINI", "CKTOTAL", "CKMB", "BNP" in the last 168 hours.    I have reviewed all pertinent lab results within the past 24 hours.    Imaging:     I have reviewed all pertinent imaging results/findings within the past 24 hours.    Micro/Path/Other:  Microbiology Results (last 7 days)       ** No results found for the last 168 hours. **           Pathology Results  (Last 7 days)      None             Problems list:  Active Problem List with Overview Notes    Diagnosis Date Noted    Delirium 04/15/2024    Hemopneumothorax on right 04/02/2024    Contusion of right lung 04/02/2024    Adrenal hemorrhage 04/02/2024    Scalp laceration 04/02/2024    Elbow laceration, left, initial encounter 04/02/2024    Acute blood loss anemia 03/29/2024    Acute hypoxemic respiratory failure 03/27/2024    Traumatic subdural hematoma (SDH) 03/27/2024    Aspiration pneumonia due to gastric secretions 03/27/2024    Closed fracture of multiple ribs " 03/27/2024    Closed nondisplaced fracture of sternal end of right clavicle 03/27/2024        Assessment & Plan:     SDH, SAH  - s/p emergent craniotomy 3/27/24  - Serial neuro q shift  - Keep HOB > 30 degrees  - Keppra completed 4/2/24  - Promote good sleep-wake cycles  - Provigil qAM  - Seroquel qHS  - Propranolol BID    R 3-9 rib fractures, R PTX/FLIP, R pulmonary contusions  - s/p acute respiratory failure s/t trauma, resolved - extubated 3/29/24  - BiPAP at night prn; has been tolerating RA without difficulty overnight  - Cefepmine x 7 days completed 4/10/24  - Encourage frequent IS  - Good pulmonary hygiene  - Nebs q6h    R clavicle fracture  - Evaluated by Orthopedics, non-op management  - Sling for comfort  - OK for ADLs, no overhead activity  - Pendulum ROM shoulder, full ROM distally    Dysphagia  - Continue working with ST  - Modified diet - Pureed with mildly thick liquids    Auto vs Scooter  - M/Th labs  - MMPC  - PT/OT  - SCDs & Lovenox 40 mg q12h for VTE ppx  - CM following for neuro rehab placement. Medically stable for placement once bed available.

## 2024-04-22 NOTE — PT/OT/SLP PROGRESS
[facility]  Speech Language Pathology Department  Therapy Progress Note    Patient Name:  Anayeli Taylor   MRN:  [id number]  Admitting Diagnosis: Traumatic subdural hematoma    Recommendations     General recommendations:  dysphagia therapy and cognitive-linguistic therapy  Communication strategies:  yes/no questions only, provide increased time to answer, and go to room if call light pushed    Discharge therapy intensity: Moderate Intensity Therapy   Barriers to safe discharge: severity of impairment and level of skilled assistance needed    Subjective     Patient awake and alert.    Pain/Comfort: 0/10  Spiritual/Cultural/Rastafarian Beliefs/Practices that affect care: no    Objective     Therapeutic PO Trials:  Consistency Amount Fed By Oral Symptoms Pharyngeal Symptoms   Ice chips 10 tsp SLP None Throat clear after swallow     Laryngeal/BOT exercises completed x20 given minimal-moderate assistance.    Assessment     Pt continues to present with cognitive linguistic impairments negatively impacting safe, independent functioning. Skilled SLP intervention continues to be warranted at this time. SLP to continue POC.     Goals     Multidisciplinary Problems       SLP Goals          Problem: SLP    Goal Priority Disciplines Outcome   SLP Goal     SLP Ongoing, Progressing   Description: LTG: Pt will tolerate least restrictive PO diet with no clinical signs/sx - progressing  ST. Pt will tolerate ice chips with no signs/sx of aspiration - continue  2. Pt will tolerate puree solids with no signs/sx of aspiration - progressing  3. Tolerate thermal stimulation to the anterior faucial pillars x10 with 100% effortful swallow responses and delay less than 2 seconds - continue    LTG: complete basic cognitive tasks with supervision  STGs:  1.  Oriented x4 modified independent  2.  Attend to tx tasks x5 minutes without redirection  3.  Follow 1-step directions 90%  4.  Answer simple yes/no questions  90%                     Patient Education     Patient provided with verbal education regarding SLP POC.  Additional teaching is warranted.    Plan     Will continue to follow and tx as appropriate.    SLP Follow-Up:  Yes   Patient to be seen:  5 x/week   Plan of Care expires:  04/24/24  Plan of Care reviewed with:  patient     Time Tracking     SLP Treatment Date: 4/22/24  Speech Start Time: 1400  Speech Stop Time:  1420  Speech Total Time (min):  20 min    Billable minutes:  Speech/Language Therapy, 20 minutes     04/22/2024

## 2024-04-22 NOTE — PLAN OF CARE
I spoke to tr  Eunice today provided details on [facility] in [place].  They are interested in pt. Md will review info today and  I will f/up in am.  Fly is in agreement as they reside in [facility].

## 2024-04-23 LAB — POCT GLUCOSE: 147 MG/DL (ref 70–110)

## 2024-04-23 PROCEDURE — 25000003 PHARM REV CODE 250: Performed by: NURSE PRACTITIONER

## 2024-04-23 PROCEDURE — 94799 UNLISTED PULMONARY SVC/PX: CPT

## 2024-04-23 PROCEDURE — 97530 THERAPEUTIC ACTIVITIES: CPT | Mod: CQ

## 2024-04-23 PROCEDURE — 99900031 HC PATIENT EDUCATION (STAT)

## 2024-04-23 PROCEDURE — 25000003 PHARM REV CODE 250

## 2024-04-23 PROCEDURE — 63600175 PHARM REV CODE 636 W HCPCS: Performed by: NURSE PRACTITIONER

## 2024-04-23 PROCEDURE — 11000001 HC ACUTE MED/SURG PRIVATE ROOM

## 2024-04-23 RX ADMIN — DOCUSATE SODIUM LIQUID 100 MG: 100 LIQUID ORAL at 09:04

## 2024-04-23 RX ADMIN — ENOXAPARIN SODIUM 40 MG: 40 INJECTION SUBCUTANEOUS at 09:04

## 2024-04-23 RX ADMIN — METHOCARBAMOL 500 MG: 500 TABLET ORAL at 09:04

## 2024-04-23 RX ADMIN — METHOCARBAMOL 500 MG: 500 TABLET ORAL at 02:04

## 2024-04-23 RX ADMIN — GUAIFENESIN AND DEXTROMETHORPHAN HYDROBROMIDE 1 TABLET: 30; 600 TABLET, EXTENDED RELEASE ORAL at 09:04

## 2024-04-23 RX ADMIN — PROPRANOLOL HYDROCHLORIDE 20 MG: 20 TABLET ORAL at 09:04

## 2024-04-23 RX ADMIN — QUETIAPINE FUMARATE 25 MG: 25 TABLET ORAL at 09:04

## 2024-04-23 RX ADMIN — Medication 2 TABLET: at 09:04

## 2024-04-23 RX ADMIN — DOXAZOSIN 1 MG: 1 TABLET ORAL at 09:04

## 2024-04-23 RX ADMIN — LIDOCAINE 5% 2 PATCH: 700 PATCH TOPICAL at 12:04

## 2024-04-23 RX ADMIN — THERA TABS 1 TABLET: TAB at 09:04

## 2024-04-23 RX ADMIN — MODAFINIL 200 MG: 100 TABLET ORAL at 09:04

## 2024-04-23 NOTE — PROGRESS NOTES
Trauma Surgery   Progress Note  Admit Date: 3/26/2024  HD#28  POD#27 Days Post-Op    Subjective:   Interval history:  AF. Intermittently tachycardic but not on exam. Rested well. Still confused but improving mentation.     Home Meds:   Current Outpatient Medications   Medication Instructions    HYDROcodone-acetaminophen (NORCO) 7.5-325 mg per tablet 1 tablet, Oral, Every 6 hours PRN    olmesartan-hydrochlorothiazide (BENICAR HCT) 20-12.5 mg per tablet 1 tablet, Oral, Daily    sildenafiL (VIAGRA) 50 mg, Oral, Daily PRN      Scheduled Meds:  Current Facility-Administered Medications   Medication Dose Route Frequency    calcium-vitamin D3  2 tablet Oral Daily    dextromethorphan-guaiFENesin  mg  1 tablet Oral BID    docusate  100 mg Oral BID    doxazosin  1 mg Oral Daily    enoxparin  40 mg Subcutaneous Q12H (prophylaxis, 0900/2100)    LIDOcaine  2 patch Transdermal Q24H    methocarbamoL  500 mg Oral TID    modafiniL  200 mg Oral Daily    multivitamin  1 tablet Oral Daily    polyethylene glycol  17 g Oral BID    propranoloL  20 mg Oral BID    QUEtiapine  25 mg Oral QHS     Continuous Infusions:  Current Facility-Administered Medications   Medication Dose Route Frequency Last Rate Last Admin     PRN Meds:    Current Facility-Administered Medications:     bisacodyL, 10 mg, Rectal, Daily PRN    dextrose 10%, 12.5 g, Intravenous, PRN    dextrose 10%, 25 g, Intravenous, PRN    glucagon (human recombinant), 1 mg, Intramuscular, PRN    hydrALAZINE, 10 mg, Intravenous, Q6H PRN    insulin aspart U-100, 0-5 Units, Subcutaneous, Q6H PRN    labetaloL, 10 mg, Intravenous, Q6H PRN    LORazepam, 1 mg, Oral, Q6H PRN    melatonin, 6 mg, Oral, Nightly PRN    ondansetron, 4 mg, Intravenous, Q6H PRN    oxyCODONE, 5 mg, Oral, Q4H PRN       Objective:     VITAL SIGNS: 24 HR MIN & MAX LAST   Temp  Min: 97.9 °F (36.6 °C)  Max: 98.4 °F (36.9 °C)  98.2 °F (36.8 °C)   BP  Min: 101/70  Max: 125/73  110/73    Pulse  Min: 73  Max: 132  85   "  Resp  Min: 17  Max: 18  17    SpO2  Min: 96 %  Max: 98 %  96 %      HT: 5' 5.98" (167.6 cm)  WT: 55.2 kg (121 lb 11.1 oz)  BMI: 19.7     Intake/output:  Intake/Output - Last 3 Shifts         04/21 0700 04/22 0659 04/22 0700 04/23 0659    P.O.  600    Total Intake(mL/kg)  600 (10.9)    Net  +600          Urine Occurrence 2 x 2 x    Stool Occurrence  2 x            Intake/Output Summary (Last 24 hours) at 4/23/2024 0649  Last data filed at 4/22/2024 1200  Gross per 24 hour   Intake 600 ml   Output --   Net 600 ml             Lines/drains/airway:       Peripheral IV - Single Lumen 04/08/24 2245 20 G Left;Posterior Forearm (Active)   Site Assessment Clean;Dry;Intact 04/08/24 2000   Extremity Assessment Distal to IV No abnormal discoloration 04/08/24 2000   Line Status Capped 04/08/24 2000   Dressing Status Clean;Dry;Intact 04/08/24 2000   Dressing Intervention Integrity maintained 04/08/24 2000   Number of days: 0            NG/OG Tube 03/30/24 0300 16 Fr. Right nostril (Active)   Placement Check placement verified by distal tube length measurement 04/06/24 0400   Distal Tube Length (cm) 60 04/07/24 0800   Tolerance no signs/symptoms of discomfort 04/08/24 2000   Securement secured to nostril center 04/08/24 2000   Clamp Status/Tolerance unclamped;no abdominal discomfort 04/08/24 2000   Suction Setting/Drainage Method suction at the bedside 04/08/24 2000   Insertion Site Appearance no redness, warmth, tenderness, skin breakdown, drainage 04/08/24 2000   Drainage None 04/08/24 2000   Flush/Irrigation flushed w/;water 04/08/24 2000   Feeding Type continuous;by pump 04/08/24 2000   Feeding Action feeding continued 04/08/24 2000   Current Rate (mL/hr) 55 mL/hr 04/08/24 2000   Goal Rate (mL/hr) 55 mL/hr 04/08/24 2000   Intake (mL) 474 mL 04/06/24 0525   Water Bolus (mL) 75 mL 04/08/24 2000   Rate Formula Tube Feeding (mL/hr) 55 mL/hr 04/08/24 2000   Formula Name impact peptide 1.5 04/08/24 2000   Intake (mL) - Formula " "Tube Feeding 535 04/03/24 0559   Number of days: 10       Female External Urinary Catheter w/ Suction 04/07/24 0800 (Active)   Number of days: 1       Physical examination:  Gen: NAD, AAOx2, answering questions appropriately  HEENT: Normocephalic, scalp incision C/D/I. Facial contusions healing.   CV: RRR  Resp: NWOB, R clavicle deformity  Abd: S/NT/ND  Msk: moving all extremities spontaneously and purposefully  Neuro: Oriented to self and place, follows commands  Skin/wounds: Warm, dry    Labs:  Renal:  Recent Labs     04/22/24 0457   BUN 11.3   CREATININE 0.60*         No results for input(s): "LACTIC" in the last 72 hours.  FEN/GI:  Recent Labs     04/22/24 0457      K 4.1   CO2 25   CALCIUM 8.6*   MG 2.00   PHOS 2.5   ALBUMIN 2.6*   BILITOT 0.3   AST 26   ALKPHOS 285*   ALT 41         Heme:  Recent Labs     04/22/24 0457   HGB 10.3*   HCT 33.3*            ID:  Recent Labs     04/22/24  0457   WBC 3.87*         CBG:  Recent Labs     04/22/24  0457   GLUCOSE 106          Recent Labs     04/20/24  2057 04/21/24  0538 04/21/24  1125 04/21/24  1631 04/21/24  2037 04/22/24  0552 04/22/24  1128 04/22/24  1607   POCTGLUCOSE 97 101 151* 123* 109 103 137* 110      Cardiovascular:  No results for input(s): "TROPONINI", "CKTOTAL", "CKMB", "BNP" in the last 168 hours.    I have reviewed all pertinent lab results within the past 24 hours.    Imaging:     I have reviewed all pertinent imaging results/findings within the past 24 hours.    Micro/Path/Other:  Microbiology Results (last 7 days)       ** No results found for the last 168 hours. **           Pathology Results  (Last 7 days)      None             Problems list:  Active Problem List with Overview Notes    Diagnosis Date Noted    Delirium 04/15/2024    Hemopneumothorax on right 04/02/2024    Contusion of right lung 04/02/2024    Adrenal hemorrhage 04/02/2024    Scalp laceration 04/02/2024    Elbow laceration, left, initial encounter 04/02/2024    Acute " blood loss anemia 03/29/2024    Acute hypoxemic respiratory failure 03/27/2024    Traumatic subdural hematoma (SDH) 03/27/2024    Aspiration pneumonia due to gastric secretions 03/27/2024    Closed fracture of multiple ribs 03/27/2024    Closed nondisplaced fracture of sternal end of right clavicle 03/27/2024        Assessment & Plan:     SDH, SAH  - s/p emergent craniotomy 3/27/24  - Serial neuro q shift  - Keep HOB > 30 degrees  - Keppra completed 4/2/24  - Promote good sleep-wake cycles  - Provigil qAM  - Seroquel qHS  - Propranolol BID    R 3-9 rib fractures, R PTX/FLIP, R pulmonary contusions  - s/p acute respiratory failure s/t trauma, resolved - extubated 3/29/24  - BiPAP at night prn; has been tolerating RA without difficulty overnight  - Cefepmine x 7 days completed 4/10/24  - Encourage frequent IS  - Good pulmonary hygiene  - Nebs q6h    R clavicle fracture  - Evaluated by Orthopedics, non-op management  - Sling for comfort  - OK for ADLs, no overhead activity  - Pendulum ROM shoulder, full ROM distally    Dysphagia  - Continue working with ST  - Modified diet - Pureed with mildly thick liquids  - Monitor blood sugar, can likely remove SSI as A1C 6.5    Auto vs Scooter  - M/Th labs  - MMPC  - PT/OT  - SCDs & Lovenox 40 mg q12h for VTE ppx  - CM following for neuro rehab placement. Medically stable for placement once bed available.     ELIAZAR Baldwin-BC   Trauma/Acute Care Surgery  [facility]  C: [phone]

## 2024-04-23 NOTE — PT/OT/SLP PROGRESS
Physical Therapy Treatment    Patient Name:  Anayeli Taylor   MRN:  [id number]    Recommendations:     Discharge therapy intensity: Moderate Intensity Therapy   Discharge Equipment Recommendations: none  Barriers to discharge: Impaired mobility and Ongoing medical needs    Assessment:     Anayeli Taylor is a 73 y.o. male admitted with a medical diagnosis of SDH.  He presents with the following impairments/functional limitations: weakness, impaired endurance, impaired self care skills, impaired functional mobility, gait instability, impaired balance, decreased upper extremity function, decreased safety awareness, pain, decreased ROM.    Rehab Prognosis: Good; patient would benefit from acute skilled PT services to address these deficits and reach maximum level of function.    Recent Surgery: Procedure(s) (LRB):  CRANIOTOMY, FOR SUBDURAL HEMATOMA EVACUATION-RIGHT (Right)  Insertion,central venous access device (Right) 27 Days Post-Op    Plan:     During this hospitalization, patient would benefit from acute PT services 5 x/week to address the identified rehab impairments via gait training, therapeutic activities, therapeutic exercises, neuromuscular re-education and progress toward the following goals:    Plan of Care Expires:  04/30/24    Subjective     Chief Complaint: pain with stretching and mobilizing   Patient/Family Comments/goals: none stated  Pain/Comfort:  Pain Rating 1: 0/10      Objective:     Communicated with nurse prior to session.  Patient found HOB elevated with telemetry, peripheral IV upon PT entry to room.     General Precautions: Standard, fall  Orthopedic Precautions: RUE non weight bearing (OK for ADLS, no OH activity)  Braces: UE Sling  Respiratory Status: Room air  Skin Integrity: Visible skin intact      Functional Mobility:  Bed Mobility:     Rolling Left:  moderate assistance  Rolling Right: moderate assistance  Scooting: maximal assistance  Supine to Sit: maximal assistance  Sit to Supine:  maximal assistance  Transfers:     Sit to Stand:  not attempted due to pain requesting to stop treatment    Therapeutic Activities/Exercises:  Static sitting edge of bed [id number]  Performed active assist range of motion to bilateral lower extremity in all planes. Significant Increase in pain noted during hip and knee flexion.    Education:  Patient provided with verbal education education regarding PT role/goals/POC, fall prevention, and safety awareness.  Understanding was verbalized.     Patient left with bed in chair position with all lines intact, call button in reach, and bed alarm on    GOALS:   Multidisciplinary Problems       Physical Therapy Goals          Problem: Physical Therapy    Goal Priority Disciplines Outcome Goal Variances Interventions   Physical Therapy Goal     PT, PT/OT Progressing     Description: Goals to be met by: 5/10/24     Patient will increase functional independence with mobility by performin. Supine to sit with MInimal Assistance  2. Sit to supine with MInimal Assistance  3. Rolling to Left and Right with Minimal Assistance.  4. Sit to stand transfer with Minimal Assistance  5. Bed to chair transfer with Minimal Assistance using LRAD  6. Sitting at edge of bed x10 minutes with Stand-by Assistance                         Time Tracking:     PT Received On: 24  PT Start Time: 1102     PT Stop Time: 1125  PT Total Time (min): 23 min     Billable Minutes: Therapeutic Activity 23    Treatment Type: Treatment  PT/PTA: PTA     Number of PTA visits since last PT visit: 4     2024

## 2024-04-23 NOTE — PLAN OF CARE
Spoke to Jeaneth Saravia/[employer] [facility] in [place], [phone].awaiting insurance approval, pt is accepted by [facility] rehab.  .  Informed fly . Of above info.[phone]

## 2024-04-24 LAB — POCT GLUCOSE: 146 MG/DL (ref 70–110)

## 2024-04-24 PROCEDURE — 25000003 PHARM REV CODE 250

## 2024-04-24 PROCEDURE — 25000003 PHARM REV CODE 250: Performed by: NURSE PRACTITIONER

## 2024-04-24 PROCEDURE — 97530 THERAPEUTIC ACTIVITIES: CPT | Mod: CQ

## 2024-04-24 PROCEDURE — 92523 SPEECH SOUND LANG COMPREHEN: CPT

## 2024-04-24 PROCEDURE — 99900035 HC TECH TIME PER 15 MIN (STAT)

## 2024-04-24 PROCEDURE — 11000001 HC ACUTE MED/SURG PRIVATE ROOM

## 2024-04-24 PROCEDURE — 97110 THERAPEUTIC EXERCISES: CPT | Mod: CQ

## 2024-04-24 PROCEDURE — 94799 UNLISTED PULMONARY SVC/PX: CPT | Mod: XB

## 2024-04-24 PROCEDURE — 63600175 PHARM REV CODE 636 W HCPCS: Performed by: NURSE PRACTITIONER

## 2024-04-24 RX ORDER — PROPRANOLOL HYDROCHLORIDE 20 MG/1
20 TABLET ORAL 3 TIMES DAILY
Status: DISCONTINUED | OUTPATIENT
Start: 2024-04-24 | End: 2024-04-30 | Stop reason: HOSPADM

## 2024-04-24 RX ADMIN — THERA TABS 1 TABLET: TAB at 09:04

## 2024-04-24 RX ADMIN — PROPRANOLOL HYDROCHLORIDE 20 MG: 20 TABLET ORAL at 09:04

## 2024-04-24 RX ADMIN — ENOXAPARIN SODIUM 40 MG: 40 INJECTION SUBCUTANEOUS at 09:04

## 2024-04-24 RX ADMIN — ENOXAPARIN SODIUM 40 MG: 40 INJECTION SUBCUTANEOUS at 08:04

## 2024-04-24 RX ADMIN — METHOCARBAMOL 500 MG: 500 TABLET ORAL at 08:04

## 2024-04-24 RX ADMIN — PROPRANOLOL HYDROCHLORIDE 20 MG: 20 TABLET ORAL at 03:04

## 2024-04-24 RX ADMIN — METHOCARBAMOL 500 MG: 500 TABLET ORAL at 09:04

## 2024-04-24 RX ADMIN — Medication 2 TABLET: at 09:04

## 2024-04-24 RX ADMIN — PROPRANOLOL HYDROCHLORIDE 20 MG: 20 TABLET ORAL at 08:04

## 2024-04-24 RX ADMIN — METHOCARBAMOL 500 MG: 500 TABLET ORAL at 03:04

## 2024-04-24 RX ADMIN — GUAIFENESIN AND DEXTROMETHORPHAN HYDROBROMIDE 1 TABLET: 30; 600 TABLET, EXTENDED RELEASE ORAL at 09:04

## 2024-04-24 RX ADMIN — MODAFINIL 200 MG: 100 TABLET ORAL at 09:04

## 2024-04-24 RX ADMIN — DOXAZOSIN 1 MG: 1 TABLET ORAL at 09:04

## 2024-04-24 RX ADMIN — QUETIAPINE FUMARATE 25 MG: 25 TABLET ORAL at 08:04
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
## 2024-04-24 NOTE — PLAN OF CARE
Problem: SLP  Goal: SLP Goal  Description: LTG: Pt will tolerate least restrictive PO diet with no clinical signs/sx - progressing  ST. Pt will tolerate ice chips with no signs/sx of aspiration - continue  2. Pt will tolerate puree solids with no signs/sx of aspiration - progressing  3. Tolerate thermal stimulation to the anterior faucial pillars x10 with 100% effortful swallow responses and delay less than 2 seconds - continue    LTG: complete basic cognitive tasks with supervision  STGs:  1.  Oriented x4 modified independent  2.  Attend to tx tasks x5 minutes without redirection  3.  Follow 1-step directions 90%-met  4. Follow 2-step directions with 90% accuracy  5.  Answer simple yes/no questions 90%  6. Participate in automatic speech tasks with 90% accuracy (MOY/FREEMAN/ counting).  Outcome: Progressing
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
## 2024-04-24 NOTE — PLAN OF CARE
[facility] sent updated clinicals to [facility] via [employer].

## 2024-04-24 NOTE — PT/OT/SLP PROGRESS
Physical Therapy Treatment    Patient Name:  Anayeli Taylor   MRN:  [id number]    Recommendations:     Discharge therapy intensity: Moderate Intensity Therapy   Discharge Equipment Recommendations: none  Barriers to discharge: Impaired mobility    Assessment:     Anayeli Taylor is a 73 y.o. male admitted with a medical diagnosis of SDH.  He presents with the following impairments/functional limitations: weakness, impaired endurance, impaired self care skills, impaired functional mobility, gait instability, impaired balance, impaired cognition, decreased upper extremity function, decreased lower extremity function, decreased safety awareness, pain, decreased ROM .    Rehab Prognosis: Good; patient would benefit from acute skilled PT services to address these deficits and reach maximum level of function.    Recent Surgery: Procedure(s) (LRB):  CRANIOTOMY, FOR SUBDURAL HEMATOMA EVACUATION-RIGHT (Right)  Insertion,central venous access device (Right) 28 Days Post-Op    Plan:     During this hospitalization, patient would benefit from acute PT services 5 x/week to address the identified rehab impairments via therapeutic activities, gait training, therapeutic exercises, neuromuscular re-education and progress toward the following goals:    Plan of Care Expires:  04/30/24    Subjective     Chief Complaint: pain when LE's are moved/stretched  Patient/Family Comments/goals:   Pain/Comfort:  Pain Rating 1:  (did not rate, appeared to severe w/ movement/ stretching)  Location - Side 1: Bilateral  Location 1: leg  Pain Addressed 1: Cessation of Activity, Reposition      Objective:     Communicated with pts nurse prior to session.  Patient found HOB elevated with   upon PT entry to room.     General Precautions: Standard, fall  Orthopedic Precautions: RUE non weight bearing  Braces: UE Sling  Respiratory Status: Room air  Blood Pressure: 117/81  inceasing to 140 during AAROM/ stretching and 170 sitting EOB. Session stopped and  nurse informed.  Skin Integrity: Visible skin intact      Functional Mobility:  Bed Mobility:     Rolling Right: moderate assistance  Scooting: moderate assistance, maximal assistance, and good attempts to assist  Supine to Sit: moderate assistance and of 2 persons  Sit to Supine: moderate assistance, maximal assistance, and of 2 persons  Balance: Sitting EOB working on bringing and maintaining COM over SHIVAM, requiring Mod to Max A    Therapeutic Activities/Exercises:  In supine pt directed in AAROM and stretch of B LE's to improve knee flexion, hip abduction, hamstring flexibility.  Pt had to be moved in small increments due to c/o pain.  Pt would eventually relax as stretch progressed.     Education:  Patient provided with verbal education education regarding PT role/goals/POC.  Additional teaching is warranted.     Patient left HOB elevated with all lines intact, call button in reach, and nurse notified    GOALS:   Multidisciplinary Problems       Physical Therapy Goals          Problem: Physical Therapy    Goal Priority Disciplines Outcome Goal Variances Interventions   Physical Therapy Goal     PT, PT/OT Progressing     Description: Goals to be met by: 5/10/24     Patient will increase functional independence with mobility by performin. Supine to sit with MInimal Assistance  2. Sit to supine with MInimal Assistance  3. Rolling to Left and Right with Minimal Assistance.  4. Sit to stand transfer with Minimal Assistance  5. Bed to chair transfer with Minimal Assistance using LRAD  6. Sitting at edge of bed x10 minutes with Stand-by Assistance                         Time Tracking:     PT Received On: 24  PT Start Time: 934     PT Stop Time: 100  PT Total Time (min): 27 min     Billable Minutes: Therapeutic Activity 13 and Therapeutic Exercise 14    Treatment Type: Treatment  PT/PTA: PTA     Number of PTA visits since last PT visit: 2024

## 2024-04-25 LAB
ALBUMIN SERPL-MCNC: 2.7 G/DL (ref 3.4–4.8)
ALBUMIN/GLOB SERPL: 0.8 RATIO (ref 1.1–2)
ALP SERPL-CCNC: 276 UNIT/L (ref 40–150)
ALT SERPL-CCNC: 74 UNIT/L (ref 0–55)
AST SERPL-CCNC: 41 UNIT/L (ref 5–34)
BASOPHILS # BLD AUTO: 0.03 X10(3)/MCL
BASOPHILS NFR BLD AUTO: 0.8 %
BILIRUB SERPL-MCNC: 0.3 MG/DL
BUN SERPL-MCNC: 12.9 MG/DL (ref 8.4–25.7)
CALCIUM SERPL-MCNC: 8.4 MG/DL (ref 8.8–10)
CHLORIDE SERPL-SCNC: 107 MMOL/L (ref 98–107)
CO2 SERPL-SCNC: 26 MMOL/L (ref 23–31)
CREAT SERPL-MCNC: 0.63 MG/DL (ref 0.73–1.18)
CRP SERPL-MCNC: 15.5 MG/L
EOSINOPHIL # BLD AUTO: 0.13 X10(3)/MCL (ref 0–0.9)
EOSINOPHIL NFR BLD AUTO: 3.6 %
ERYTHROCYTE [DISTWIDTH] IN BLOOD BY AUTOMATED COUNT: 16.3 % (ref 11.5–17)
GFR SERPLBLD CREATININE-BSD FMLA CKD-EPI: >60 MLS/MIN/1.73/M2
GLOBULIN SER-MCNC: 3.5 GM/DL (ref 2.4–3.5)
GLUCOSE SERPL-MCNC: 104 MG/DL (ref 82–115)
HCT VFR BLD AUTO: 35.1 % (ref 42–52)
HGB BLD-MCNC: 11.1 G/DL (ref 14–18)
IMM GRANULOCYTES # BLD AUTO: 0 X10(3)/MCL (ref 0–0.04)
IMM GRANULOCYTES NFR BLD AUTO: 0 %
LYMPHOCYTES # BLD AUTO: 1.75 X10(3)/MCL (ref 0.6–4.6)
LYMPHOCYTES NFR BLD AUTO: 49 %
MAGNESIUM SERPL-MCNC: 2 MG/DL (ref 1.6–2.6)
MCH RBC QN AUTO: 29.6 PG (ref 27–31)
MCHC RBC AUTO-ENTMCNC: 31.6 G/DL (ref 33–36)
MCV RBC AUTO: 93.6 FL (ref 80–94)
MONOCYTES # BLD AUTO: 0.44 X10(3)/MCL (ref 0.1–1.3)
MONOCYTES NFR BLD AUTO: 12.3 %
NEUTROPHILS # BLD AUTO: 1.22 X10(3)/MCL (ref 2.1–9.2)
NEUTROPHILS NFR BLD AUTO: 34.3 %
NRBC BLD AUTO-RTO: 0 %
PHOSPHATE SERPL-MCNC: 2.8 MG/DL (ref 2.3–4.7)
PLATELET # BLD AUTO: 201 X10(3)/MCL (ref 130–400)
PMV BLD AUTO: 9 FL (ref 7.4–10.4)
POCT GLUCOSE: 108 MG/DL (ref 70–110)
POTASSIUM SERPL-SCNC: 4.3 MMOL/L (ref 3.5–5.1)
PREALB SERPL-MCNC: 18 MG/DL (ref 16–42)
PROT SERPL-MCNC: 6.2 GM/DL (ref 5.8–7.6)
RBC # BLD AUTO: 3.75 X10(6)/MCL (ref 4.7–6.1)
SODIUM SERPL-SCNC: 141 MMOL/L (ref 136–145)
WBC # SPEC AUTO: 3.57 X10(3)/MCL (ref 4.5–11.5)

## 2024-04-25 PROCEDURE — 97164 PT RE-EVAL EST PLAN CARE: CPT

## 2024-04-25 PROCEDURE — 84100 ASSAY OF PHOSPHORUS: CPT

## 2024-04-25 PROCEDURE — 99900035 HC TECH TIME PER 15 MIN (STAT)

## 2024-04-25 PROCEDURE — 94760 N-INVAS EAR/PLS OXIMETRY 1: CPT

## 2024-04-25 PROCEDURE — 97110 THERAPEUTIC EXERCISES: CPT

## 2024-04-25 PROCEDURE — 80053 COMPREHEN METABOLIC PANEL: CPT

## 2024-04-25 PROCEDURE — 92507 TX SP LANG VOICE COMM INDIV: CPT

## 2024-04-25 PROCEDURE — 11000001 HC ACUTE MED/SURG PRIVATE ROOM

## 2024-04-25 PROCEDURE — 94799 UNLISTED PULMONARY SVC/PX: CPT

## 2024-04-25 PROCEDURE — 36415 COLL VENOUS BLD VENIPUNCTURE: CPT

## 2024-04-25 PROCEDURE — 83735 ASSAY OF MAGNESIUM: CPT

## 2024-04-25 PROCEDURE — 86140 C-REACTIVE PROTEIN: CPT | Performed by: NURSE PRACTITIONER

## 2024-04-25 PROCEDURE — 85025 COMPLETE CBC W/AUTO DIFF WBC: CPT

## 2024-04-25 PROCEDURE — 25000003 PHARM REV CODE 250: Performed by: NURSE PRACTITIONER

## 2024-04-25 PROCEDURE — 63600175 PHARM REV CODE 636 W HCPCS: Performed by: NURSE PRACTITIONER

## 2024-04-25 PROCEDURE — 25000003 PHARM REV CODE 250

## 2024-04-25 PROCEDURE — 84134 ASSAY OF PREALBUMIN: CPT | Performed by: NURSE PRACTITIONER

## 2024-04-25 RX ORDER — METHOCARBAMOL 500 MG/1
500 TABLET, FILM COATED ORAL 4 TIMES DAILY
Status: DISCONTINUED | OUTPATIENT
Start: 2024-04-25 | End: 2024-04-28

## 2024-04-25 RX ADMIN — LIDOCAINE 5% 2 PATCH: 700 PATCH TOPICAL at 02:04

## 2024-04-25 RX ADMIN — ENOXAPARIN SODIUM 40 MG: 40 INJECTION SUBCUTANEOUS at 08:04

## 2024-04-25 RX ADMIN — PROPRANOLOL HYDROCHLORIDE 20 MG: 20 TABLET ORAL at 09:04

## 2024-04-25 RX ADMIN — QUETIAPINE FUMARATE 25 MG: 25 TABLET ORAL at 08:04

## 2024-04-25 RX ADMIN — PROPRANOLOL HYDROCHLORIDE 20 MG: 20 TABLET ORAL at 08:04

## 2024-04-25 RX ADMIN — METHOCARBAMOL 500 MG: 500 TABLET ORAL at 05:04

## 2024-04-25 RX ADMIN — ENOXAPARIN SODIUM 40 MG: 40 INJECTION SUBCUTANEOUS at 09:04

## 2024-04-25 RX ADMIN — THERA TABS 1 TABLET: TAB at 09:04

## 2024-04-25 RX ADMIN — METHOCARBAMOL 500 MG: 500 TABLET ORAL at 08:04

## 2024-04-25 RX ADMIN — Medication 2 TABLET: at 09:04

## 2024-04-25 RX ADMIN — GUAIFENESIN AND DEXTROMETHORPHAN HYDROBROMIDE 1 TABLET: 30; 600 TABLET, EXTENDED RELEASE ORAL at 09:04

## 2024-04-25 RX ADMIN — PROPRANOLOL HYDROCHLORIDE 20 MG: 20 TABLET ORAL at 02:04

## 2024-04-25 RX ADMIN — METHOCARBAMOL 500 MG: 500 TABLET ORAL at 09:04

## 2024-04-25 RX ADMIN — POLYETHYLENE GLYCOL 3350 17 G: 17 POWDER, FOR SOLUTION ORAL at 09:04

## 2024-04-25 RX ADMIN — DOCUSATE SODIUM LIQUID 100 MG: 100 LIQUID ORAL at 09:04

## 2024-04-25 RX ADMIN — MODAFINIL 200 MG: 100 TABLET ORAL at 09:04

## 2024-04-25 RX ADMIN — DOXAZOSIN 1 MG: 1 TABLET ORAL at 09:04

## 2024-04-25 NOTE — PT/OT/SLP RE-EVAL
Physical Therapy Re-Evaluation    Patient Name:  Anayeli Taylor   MRN:  [id number]    Recommendations:     Discharge therapy intensity: Moderate Intensity Therapy   Discharge Equipment Recommendations:  (TBD by next level of care)   Barriers to discharge: None    Assessment:     Anayeli Taylor is a 73 y.o. male admitted with a medical diagnosis of traumatic SDH.  He presents with the following impairments/functional limitations: weakness, impaired endurance, impaired self care skills, impaired functional mobility, gait instability, impaired balance, impaired cognition, decreased upper extremity function, decreased lower extremity function, decreased safety awareness, pain. Patient making great progress with PT. We have placed emphasis on BLE stretching as he has severe tightness. We mobilized him to EOB with MOD A. Stood with RW & MOD A. Tightness prevents normal gait mechanics, however, he was able to hop a short distance to chair. Strongly recommending MOD intensity therapy to address the above deficits. Continue to progress patient as tolerated.     Rehab Prognosis: Good; patient would benefit from acute skilled PT services to address these deficits and reach maximum level of function.    Recent Surgery: Procedure(s) (LRB):  CRANIOTOMY, FOR SUBDURAL HEMATOMA EVACUATION-RIGHT (Right)  Insertion,central venous access device (Right) 29 Days Post-Op    Plan:     During this hospitalization, patient would benefit from acute PT services 5 x/week to address the identified rehab impairments via gait training, therapeutic activities, therapeutic exercises, neuromuscular re-education and progress toward the following goals:    Plan of Care Expires:  04/30/24    PT/PTA conference to discuss PT POC and patient's progression towards goals held with Andressa Viveros PTA.     Subjective     Chief Complaint: pain  Patient/Family Comments/goals: none  Pain/Comfort:  Pain Rating 1:  (Reporting whole body pain. Did not  rate)    Patients cultural, spiritual, Anabaptist conflicts given the current situation: no    Objective:     Communicated with nurse prior to session.  Patient found supine with telemetry  upon PT entry to room.    General Precautions: Standard, fall  Orthopedic Precautions:RUE non weight bearing   Braces: UE Sling  Respiratory Status: Room air    Exams:  Cognitive Exam:  Patient is oriented to Person  Sensation: -       Intact  RLE ROM: Impaired at all joints and in all planes  RLE Strength: Unable to accurately assess 2/2 severe tightness  LLE ROM: Impaired at all joints and in all planes  LLE Strength: Unable to accurately assess 2/2 severe tightness  Skin integrity: Visible skin intact      Functional Mobility:  Bed Mobility:  Supine to Sit: moderate assistance  Transfers:  Sit to Stand:  moderate assistance with rolling walker  Gait: 3 hops to chair with MIN A.   Balance: fair/poor      AM-PAC 6 CLICK MOBILITY  Total Score:11     Therapeutic Exercise  -AAROM performed to BLE in all planes at hip, knee, and ankle joints. 2x10 reps each. Frequent rest breaks.       Patient left up in chair with all lines intact, call button in reach, geomat cushion, enrique pad in place, and posey vest on . Cousin present. Nurse notified. Educated on calling for assistance when ready to return to bed    GOALS:   Multidisciplinary Problems       Physical Therapy Goals          Problem: Physical Therapy    Goal Priority Disciplines Outcome Goal Variances Interventions   Physical Therapy Goal     PT, PT/OT Progressing     Description: Goals to be met by: 5/10/24     Patient will increase functional independence with mobility by performin. Supine to sit with MInimal Assistance  2. Sit to supine with MInimal Assistance  3. Rolling to Left and Right with Minimal Assistance.  4. Sit to stand transfer with Minimal Assistance  5. Bed to chair transfer with Minimal Assistance using LRAD  6. Sitting at edge of bed x10 minutes with  Stand-by Assistance  7. Ambulate 25 ft with RW & MIN A                         History:     No past medical history on file.    Past Surgical History:   Procedure Laterality Date    CRANIOTOMY FOR EVACUATION OF SUBDURAL HEMATOMA Right 3/27/2024    Procedure: CRANIOTOMY, FOR SUBDURAL HEMATOMA EVACUATION-RIGHT;  Surgeon: Davonte Resendiz MD;  Location: [facility];  Service: Neurosurgery;  Laterality: Right;    INSERTION, CENTRAL VENOUS ACCESS DEVICE Right 3/27/2024    Procedure: Insertion,central venous access device;  Surgeon: Davonte Resendiz MD;  Location: [facility];  Service: Neurosurgery;  Laterality: Right;  per Dr. Woody- start at 0405       Time Tracking:     PT Received On: 04/25/24  PT Start Time: 1130     PT Stop Time: 1200  PT Total Time (min): 30 min     Billable Minutes: Re-eval 20 minutes and Therapeutic Exercise 10 minutes      04/25/2024

## 2024-04-25 NOTE — PT/OT/SLP PROGRESS
"[facility]  Speech Language Pathology Department  Therapy Progress Note    Patient Name:  Anayeli Taylor   MRN:  [id number]  Admitting Diagnosis: Traumatic subdural hematoma    Recommendations     General recommendations:  dysphagia therapy and cognitive-linguistic therapy  Communication strategies:  yes/no questions only, provide increased time to answer, and go to room if call light pushed    Discharge therapy intensity: Moderate Intensity Therapy   Barriers to safe discharge: severity of impairment and level of skilled assistance needed    Subjective     Patient awake and alert.    Pain/Comfort:  0/10  Spiritual/Cultural/Zoroastrian Beliefs/Practices that affect care: no    Objective     Orientation: oriented to self and situation; not oriented to place and time despite cues    Sustained attention: 2 minutes    Simple yes/no questions: 60% moderate cues    Automatic sequences:   Counting: able to count 1-15 with encouragement; difficulty with 11-15, stating "I never went to school that much"  Days of the week: Unable to name days independently, states there are 10 days in a week; able to repeat after SLP  Months of the year: Unable to perform, states "I went to school for a long time but I never learned that" however then states he did not attend much school again    Assessment     Pt continues to present with cognitive linguistic impairments negatively impacting safe, independent functioning. Skilled SLP intervention continues to be warranted at this time. SLP to continue POC.     Goals     Multidisciplinary Problems       SLP Goals          Problem: SLP    Goal Priority Disciplines Outcome   SLP Goal     SLP Progressing   Description: LTG: Pt will tolerate least restrictive PO diet with no clinical signs/sx - progressing  ST. Pt will tolerate ice chips with no signs/sx of aspiration - continue  2. Pt will tolerate puree solids with no signs/sx of aspiration - progressing  3. " Tolerate thermal stimulation to the anterior faucial pillars x10 with 100% effortful swallow responses and delay less than 2 seconds - continue    LTG: complete basic cognitive tasks with supervision  STGs:  1.  Oriented x4 modified independent  2.  Attend to tx tasks x5 minutes without redirection  3.  Follow 1-step directions 90%-met  4. Follow 2-step directions with 90% accuracy  5.  Answer simple yes/no questions 90%  6. Participate in automatic speech tasks with 90% accuracy (MOY/FREEMAN/ counting).                     Patient Education     Patient provided with verbal education regarding SLP POC.  Additional teaching is warranted.    Plan     Will continue to follow and tx as appropriate.    SLP Follow-Up:  Yes   Patient to be seen:  5 x/week   Plan of Care expires:  05/08/24  Plan of Care reviewed with:  patient     Time Tracking     SLP Treatment Date:   04/25/24  Speech Start Time:  0840  Speech Stop Time:  0853     Speech Total Time (min):  13 min    Billable minutes:  Speech/Language Therapy, 13 minutes     04/25/2024

## 2024-04-25 NOTE — PLAN OF CARE
Problem: Adult Inpatient Plan of Care  Goal: Plan of Care Review  Outcome: Progressing  Goal: Patient-Specific Goal (Individualized)  Outcome: Progressing  Goal: Absence of Hospital-Acquired Illness or Injury  Outcome: Progressing  Goal: Optimal Comfort and Wellbeing  Outcome: Progressing  Goal: Readiness for Transition of Care  Outcome: Progressing     Problem: Infection  Goal: Absence of Infection Signs and Symptoms  Outcome: Progressing     Problem: Impaired Wound Healing  Goal: Optimal Wound Healing  Outcome: Progressing     Problem: Fall Injury Risk  Goal: Absence of Fall and Fall-Related Injury  Outcome: Progressing     Problem: Restraint, Nonbehavioral (Nonviolent)  Goal: Absence of Harm or Injury  Outcome: Progressing     Problem: Skin Injury Risk Increased  Goal: Skin Health and Integrity  Outcome: Progressing
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
## 2024-04-25 NOTE — PLAN OF CARE
Problem: Physical Therapy  Goal: Physical Therapy Goal  Description: Goals to be met by: 5/10/24     Patient will increase functional independence with mobility by performin. Supine to sit with MInimal Assistance  2. Sit to supine with MInimal Assistance  3. Rolling to Left and Right with Minimal Assistance.  4. Sit to stand transfer with Minimal Assistance  5. Bed to chair transfer with Minimal Assistance using LRAD  6. Sitting at edge of bed x10 minutes with Stand-by Assistance  7. Ambulate 25 ft with RW & MIN A    2024 1341 by Melida Whitney, PT  Outcome: Revised

## 2024-04-25 NOTE — PLAN OF CARE
Problem: Adult Inpatient Plan of Care  Goal: Plan of Care Review  Outcome: Progressing  Goal: Patient-Specific Goal (Individualized)  Outcome: Progressing  Goal: Absence of Hospital-Acquired Illness or Injury  Outcome: Progressing  Goal: Optimal Comfort and Wellbeing  Outcome: Progressing  Goal: Readiness for Transition of Care  Outcome: Progressing     Problem: Infection  Goal: Absence of Infection Signs and Symptoms  Outcome: Progressing     Problem: Impaired Wound Healing  Goal: Optimal Wound Healing  Outcome: Progressing     Problem: Fall Injury Risk  Goal: Absence of Fall and Fall-Related Injury  Outcome: Progressing     Problem: Skin Injury Risk Increased  Goal: Skin Health and Integrity  Outcome: Progressing

## 2024-04-26 PROCEDURE — 63600175 PHARM REV CODE 636 W HCPCS: Performed by: NURSE PRACTITIONER

## 2024-04-26 PROCEDURE — 25000003 PHARM REV CODE 250

## 2024-04-26 PROCEDURE — 97110 THERAPEUTIC EXERCISES: CPT | Mod: CQ

## 2024-04-26 PROCEDURE — 25000003 PHARM REV CODE 250: Performed by: NURSE PRACTITIONER

## 2024-04-26 PROCEDURE — 11000001 HC ACUTE MED/SURG PRIVATE ROOM

## 2024-04-26 PROCEDURE — 92507 TX SP LANG VOICE COMM INDIV: CPT

## 2024-04-26 PROCEDURE — 97530 THERAPEUTIC ACTIVITIES: CPT | Mod: CQ

## 2024-04-26 RX ORDER — SUCRALFATE 1 G/1
1 TABLET ORAL
Status: DISCONTINUED | OUTPATIENT
Start: 2024-04-26 | End: 2024-04-30 | Stop reason: HOSPADM

## 2024-04-26 RX ORDER — FAMOTIDINE 20 MG/1
20 TABLET, FILM COATED ORAL 2 TIMES DAILY
Status: DISCONTINUED | OUTPATIENT
Start: 2024-04-26 | End: 2024-04-30 | Stop reason: HOSPADM

## 2024-04-26 RX ADMIN — POLYETHYLENE GLYCOL 3350 17 G: 17 POWDER, FOR SOLUTION ORAL at 09:04

## 2024-04-26 RX ADMIN — THERA TABS 1 TABLET: TAB at 09:04

## 2024-04-26 RX ADMIN — ENOXAPARIN SODIUM 40 MG: 40 INJECTION SUBCUTANEOUS at 09:04

## 2024-04-26 RX ADMIN — PROPRANOLOL HYDROCHLORIDE 20 MG: 20 TABLET ORAL at 04:04

## 2024-04-26 RX ADMIN — DOCUSATE SODIUM LIQUID 100 MG: 100 LIQUID ORAL at 09:04

## 2024-04-26 RX ADMIN — FAMOTIDINE 20 MG: 20 TABLET, FILM COATED ORAL at 09:04

## 2024-04-26 RX ADMIN — Medication 2 TABLET: at 09:04

## 2024-04-26 RX ADMIN — LIDOCAINE 5% 2 PATCH: 700 PATCH TOPICAL at 12:04

## 2024-04-26 RX ADMIN — OXYCODONE HYDROCHLORIDE 5 MG: 5 TABLET ORAL at 10:04

## 2024-04-26 RX ADMIN — GUAIFENESIN AND DEXTROMETHORPHAN HYDROBROMIDE 1 TABLET: 30; 600 TABLET, EXTENDED RELEASE ORAL at 09:04

## 2024-04-26 RX ADMIN — QUETIAPINE FUMARATE 25 MG: 25 TABLET ORAL at 09:04

## 2024-04-26 RX ADMIN — METHOCARBAMOL 500 MG: 500 TABLET ORAL at 04:04

## 2024-04-26 RX ADMIN — SUCRALFATE 1 G: 1 TABLET ORAL at 04:04

## 2024-04-26 RX ADMIN — OXYCODONE HYDROCHLORIDE 5 MG: 5 TABLET ORAL at 09:04

## 2024-04-26 RX ADMIN — METHOCARBAMOL 500 MG: 500 TABLET ORAL at 12:04

## 2024-04-26 RX ADMIN — METHOCARBAMOL 500 MG: 500 TABLET ORAL at 09:04

## 2024-04-26 RX ADMIN — SUCRALFATE 1 G: 1 TABLET ORAL at 09:04

## 2024-04-26 RX ADMIN — DOXAZOSIN 1 MG: 1 TABLET ORAL at 09:04

## 2024-04-26 RX ADMIN — PROPRANOLOL HYDROCHLORIDE 20 MG: 20 TABLET ORAL at 09:04

## 2024-04-26 NOTE — PT/OT/SLP PROGRESS
[facility]  Speech Language Pathology Department  Therapy Progress Note    Patient Name:  Anayeli Taylor   MRN:  [id number]  Admitting Diagnosis: Traumatic subdural hematoma    Recommendations     General recommendations:  dysphagia therapy and cognitive-linguistic therapy  Communication strategies:  yes/no questions only, provide increased time to answer, and go to room if call light pushed    Discharge therapy intensity: Moderate Intensity Therapy   Barriers to safe discharge: severity of impairment and level of skilled assistance needed    Subjective     Patient awake and alert.    Pain/Comfort:  0/10  Spiritual/Cultural/Evangelical Beliefs/Practices that affect care: no    Objective     Orientation: oriented to self and situation; not oriented to place and time despite cues    Sustained attention: 1-2 minutes; more easily distracted than previous session    Simple yes/no questions: 50% moderate cues    Assessment     Pt continues to present with cognitive linguistic impairments negatively impacting safe, independent functioning. Skilled SLP intervention continues to be warranted at this time. SLP to continue POC.     Goals     Multidisciplinary Problems       SLP Goals          Problem: SLP    Goal Priority Disciplines Outcome   SLP Goal     SLP Progressing   Description: LTG: Pt will tolerate least restrictive PO diet with no clinical signs/sx - progressing  ST. Pt will tolerate ice chips with no signs/sx of aspiration - continue  2. Pt will tolerate puree solids with no signs/sx of aspiration - progressing  3. Tolerate thermal stimulation to the anterior faucial pillars x10 with 100% effortful swallow responses and delay less than 2 seconds - continue    LTG: complete basic cognitive tasks with supervision  STGs:  1.  Oriented x4 modified independent  2.  Attend to tx tasks x5 minutes without redirection  3.  Follow 1-step directions 90%-met  4. Follow 2-step directions with 90%  accuracy  5.  Answer simple yes/no questions 90%  6. Participate in automatic speech tasks with 90% accuracy (MOY/FREEMAN/ counting).                     Patient Education     Patient provided with verbal education regarding SLP POC.  Additional teaching is warranted.    Plan     Will continue to follow and tx as appropriate.    SLP Follow-Up:  Yes   Patient to be seen:  5 x/week   Plan of Care expires:  05/08/24  Plan of Care reviewed with:  patient     Time Tracking     SLP Treatment Date:   04/26/24  Speech Start Time:  0926  Speech Stop Time:  0937     Speech Total Time (min):  11 min    Billable minutes:  Speech/Language Therapy, 11 minutes     04/26/2024

## 2024-04-26 NOTE — PLAN OF CARE
Peer to peer completed, [facility] upheld decision.  [facility] and family/[facility] appealed decision.  Appeal sent to [facility] today, will await determination
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
## 2024-04-26 NOTE — PT/OT/SLP PROGRESS
Physical Therapy Treatment    Patient Name:  Anayeli Taylor   MRN:  [id number]    Recommendations:     Discharge therapy intensity: Moderate Intensity Therapy   Discharge Equipment Recommendations:  (TBD by next level of care)  Barriers to discharge: Impaired mobility and Ongoing medical needs    Assessment:     Anayeli Taylor is a 73 y.o. male.  He presents with the following impairments/functional limitations: weakness, impaired endurance, impaired self care skills, impaired functional mobility, gait instability, impaired balance, impaired cognition, decreased upper extremity function, decreased lower extremity function, decreased safety awareness, pain.    Rehab Prognosis: Good; patient would benefit from acute skilled PT services to address these deficits and reach maximum level of function.    Recent Surgery: Procedure(s) (LRB):  CRANIOTOMY, FOR SUBDURAL HEMATOMA EVACUATION-RIGHT (Right)  Insertion,central venous access device (Right) 30 Days Post-Op    Plan:     During this hospitalization, patient would benefit from acute PT services 5 x/week to address the identified rehab impairments via gait training, therapeutic activities, therapeutic exercises, neuromuscular re-education and progress toward the following goals:    Plan of Care Expires:  04/30/24    Subjective     Chief Complaint: dr you are killing me. That was directed to me as the result of mobilizing pt.    Objective:     Communicated with nurse prior to session.  Patient found supine with   upon PT entry to room.     General Precautions: Standard, fall  Orthopedic Precautions: RUE non weight bearing  Braces: UE Sling  Respiratory Status: Room air  Blood Pressure: 107/65  Skin Integrity: Visible skin intact      Functional Mobility:  Max assist to get EOB and max x 2 bed to chair.   Pull to stand using rail with total assist x 2. Pt only able to achieve 1/2 stand  PROM stretching to LE to improve ROM and to decrease tone/tightness.     Education  Provided:  Role and goals of PT, transfer training, bed mobility, gait training, balance training, safety awareness, assistive device, strengthening exercises, and importance of participating in PT to return to PLOF.    Patient left up in chair with all lines intact, call button in reach, bed alarm on, geomat cushion, and enrique pad in place    GOALS:   Multidisciplinary Problems       Physical Therapy Goals          Problem: Physical Therapy    Goal Priority Disciplines Outcome Goal Variances Interventions   Physical Therapy Goal     PT, PT/OT Progressing     Description: Goals to be met by: 5/10/24     Patient will increase functional independence with mobility by performin. Supine to sit with MInimal Assistance  2. Sit to supine with MInimal Assistance  3. Rolling to Left and Right with Minimal Assistance.  4. Sit to stand transfer with Minimal Assistance  5. Bed to chair transfer with Minimal Assistance using LRAD  6. Sitting at edge of bed x10 minutes with Stand-by Assistance  7. Ambulate 25 ft with RW & MIN A                         Time Tracking:       Billable Minutes: Act 15 Exer 10    Treatment Type: Treatment  PT/PTA: PTA     Number of PTA visits since last PT visit: 2024

## 2024-04-27 PROCEDURE — 99900035 HC TECH TIME PER 15 MIN (STAT)

## 2024-04-27 PROCEDURE — 25000003 PHARM REV CODE 250

## 2024-04-27 PROCEDURE — 11000001 HC ACUTE MED/SURG PRIVATE ROOM

## 2024-04-27 PROCEDURE — 99900031 HC PATIENT EDUCATION (STAT)

## 2024-04-27 PROCEDURE — 63600175 PHARM REV CODE 636 W HCPCS: Performed by: NURSE PRACTITIONER

## 2024-04-27 PROCEDURE — 25000003 PHARM REV CODE 250: Performed by: NURSE PRACTITIONER

## 2024-04-27 PROCEDURE — 94799 UNLISTED PULMONARY SVC/PX: CPT

## 2024-04-27 PROCEDURE — 94760 N-INVAS EAR/PLS OXIMETRY 1: CPT

## 2024-04-27 RX ADMIN — SUCRALFATE 1 G: 1 TABLET ORAL at 12:04

## 2024-04-27 RX ADMIN — METHOCARBAMOL 500 MG: 500 TABLET ORAL at 12:04

## 2024-04-27 RX ADMIN — GUAIFENESIN AND DEXTROMETHORPHAN HYDROBROMIDE 1 TABLET: 30; 600 TABLET, EXTENDED RELEASE ORAL at 08:04

## 2024-04-27 RX ADMIN — POLYETHYLENE GLYCOL 3350 17 G: 17 POWDER, FOR SOLUTION ORAL at 08:04

## 2024-04-27 RX ADMIN — METHOCARBAMOL 500 MG: 500 TABLET ORAL at 08:04

## 2024-04-27 RX ADMIN — Medication 2 TABLET: at 08:04

## 2024-04-27 RX ADMIN — FAMOTIDINE 20 MG: 20 TABLET, FILM COATED ORAL at 08:04

## 2024-04-27 RX ADMIN — SUCRALFATE 1 G: 1 TABLET ORAL at 05:04

## 2024-04-27 RX ADMIN — THERA TABS 1 TABLET: TAB at 08:04

## 2024-04-27 RX ADMIN — DOCUSATE SODIUM LIQUID 100 MG: 100 LIQUID ORAL at 08:04

## 2024-04-27 RX ADMIN — DOXAZOSIN 1 MG: 1 TABLET ORAL at 08:04

## 2024-04-27 RX ADMIN — SUCRALFATE 1 G: 1 TABLET ORAL at 08:04

## 2024-04-27 RX ADMIN — ENOXAPARIN SODIUM 40 MG: 40 INJECTION SUBCUTANEOUS at 08:04

## 2024-04-27 RX ADMIN — METHOCARBAMOL 500 MG: 500 TABLET ORAL at 05:04

## 2024-04-27 RX ADMIN — QUETIAPINE FUMARATE 25 MG: 25 TABLET ORAL at 08:04

## 2024-04-27 RX ADMIN — PROPRANOLOL HYDROCHLORIDE 20 MG: 20 TABLET ORAL at 03:04

## 2024-04-27 RX ADMIN — LIDOCAINE 5% 2 PATCH: 700 PATCH TOPICAL at 12:04

## 2024-04-27 RX ADMIN — PROPRANOLOL HYDROCHLORIDE 20 MG: 20 TABLET ORAL at 08:04

## 2024-04-27 NOTE — PROGRESS NOTES
"   Trauma Surgery   Progress Note  Admit Date: 3/26/2024  HD#32  POD#31 Days Post-Op    Subjective:   Interval history:  AF VSS. No abdominal pain. Resting comfortably     Home Meds:   Current Outpatient Medications   Medication Instructions    HYDROcodone-acetaminophen (NORCO) 7.5-325 mg per tablet 1 tablet, Oral, Every 6 hours PRN    olmesartan-hydrochlorothiazide (BENICAR HCT) 20-12.5 mg per tablet 1 tablet, Oral, Daily    sildenafiL (VIAGRA) 50 mg, Oral, Daily PRN      Scheduled Meds:  Current Facility-Administered Medications   Medication Dose Route Frequency    calcium-vitamin D3  2 tablet Oral Daily    dextromethorphan-guaiFENesin  mg  1 tablet Oral BID    docusate  100 mg Oral BID    doxazosin  1 mg Oral Daily    enoxparin  40 mg Subcutaneous Q12H (prophylaxis, 0900/2100)    famotidine  20 mg Oral BID    LIDOcaine  2 patch Transdermal Q24H    methocarbamoL  500 mg Oral QID    multivitamin  1 tablet Oral Daily    polyethylene glycol  17 g Oral BID    propranoloL  20 mg Oral TID    QUEtiapine  25 mg Oral QHS    sucralfate  1 g Oral QID (AC & HS)     Continuous Infusions:  Current Facility-Administered Medications   Medication Dose Route Frequency Last Rate Last Admin     PRN Meds:    Current Facility-Administered Medications:     bisacodyL, 10 mg, Rectal, Daily PRN    dextrose 10%, 12.5 g, Intravenous, PRN    dextrose 10%, 25 g, Intravenous, PRN    hydrALAZINE, 10 mg, Intravenous, Q6H PRN    labetaloL, 10 mg, Intravenous, Q6H PRN    LORazepam, 1 mg, Oral, Q6H PRN    melatonin, 6 mg, Oral, Nightly PRN    ondansetron, 4 mg, Intravenous, Q6H PRN    oxyCODONE, 5 mg, Oral, Q4H PRN       Objective:     VITAL SIGNS: 24 HR MIN & MAX LAST   Temp  Min: 97.8 °F (36.6 °C)  Max: 98.5 °F (36.9 °C)  98.1 °F (36.7 °C)   BP  Min: 107/65  Max: 134/76  134/76    Pulse  Min: 71  Max: 79  79    Resp  Min: 16  Max: 18  16    SpO2  Min: 92 %  Max: 98 %  97 %      HT: 5' 5.98" (167.6 cm)  WT: 55.2 kg (121 lb 11.1 oz)  BMI: 19.7 "     Intake/output:  Intake/Output - Last 3 Shifts         04/25 0700 04/26 0659 04/26 0700 04/27 0659    P.O. 960 680    Total Intake(mL/kg) 960 (17.4) 680 (12.3)    Urine (mL/kg/hr)  250 (0.2)    Total Output  250    Net +960 +430          Urine Occurrence 2 x 1 x    Stool Occurrence 1 x             Intake/Output Summary (Last 24 hours) at 4/27/2024 0659  Last data filed at 4/27/2024 0535  Gross per 24 hour   Intake 680 ml   Output 250 ml   Net 430 ml             Lines/drains/airway:       Peripheral IV - Single Lumen 04/08/24 2245 20 G Left;Posterior Forearm (Active)   Site Assessment Clean;Dry;Intact 04/08/24 2000   Extremity Assessment Distal to IV No abnormal discoloration 04/08/24 2000   Line Status Capped 04/08/24 2000   Dressing Status Clean;Dry;Intact 04/08/24 2000   Dressing Intervention Integrity maintained 04/08/24 2000   Number of days: 0            NG/OG Tube 03/30/24 0300 16 Fr. Right nostril (Active)   Placement Check placement verified by distal tube length measurement 04/06/24 0400   Distal Tube Length (cm) 60 04/07/24 0800   Tolerance no signs/symptoms of discomfort 04/08/24 2000   Securement secured to nostril center 04/08/24 2000   Clamp Status/Tolerance unclamped;no abdominal discomfort 04/08/24 2000   Suction Setting/Drainage Method suction at the bedside 04/08/24 2000   Insertion Site Appearance no redness, warmth, tenderness, skin breakdown, drainage 04/08/24 2000   Drainage None 04/08/24 2000   Flush/Irrigation flushed w/;water 04/08/24 2000   Feeding Type continuous;by pump 04/08/24 2000   Feeding Action feeding continued 04/08/24 2000   Current Rate (mL/hr) 55 mL/hr 04/08/24 2000   Goal Rate (mL/hr) 55 mL/hr 04/08/24 2000   Intake (mL) 474 mL 04/06/24 0525   Water Bolus (mL) 75 mL 04/08/24 2000   Rate Formula Tube Feeding (mL/hr) 55 mL/hr 04/08/24 2000   Formula Name impact peptide 1.5 04/08/24 2000   Intake (mL) - Formula Tube Feeding 535 04/03/24 0559   Number of days: 10       Female  "External Urinary Catheter w/ Suction 04/07/24 0800 (Active)   Number of days: 1       Physical examination:  Gen: NAD, AAOx2, answering questions appropriately  HEENT: Normocephalic, scalp incision C/D/I. Facial contusions healing.   CV: RRR  Resp: NWOB, R clavicle deformity  Abd: S/NT/ND  Msk: moving all extremities spontaneously and purposefully  Neuro: Oriented to self and place, follows commands  Skin/wounds: Warm, dry    Labs:  Renal:  Recent Labs     04/25/24 0414   BUN 12.9   CREATININE 0.63*         No results for input(s): "LACTIC" in the last 72 hours.  FEN/GI:  Recent Labs     04/25/24 0414      K 4.3   CO2 26   CALCIUM 8.4*   MG 2.00   PHOS 2.8   ALBUMIN 2.7*   BILITOT 0.3   AST 41*   ALKPHOS 276*   ALT 74*         Heme:  Recent Labs     04/25/24 0414   HGB 11.1*   HCT 35.1*            ID:  Recent Labs     04/25/24 0414   WBC 3.57*         CBG:  Recent Labs     04/25/24 0414   GLUCOSE 104          Recent Labs     04/24/24 2051 04/25/24  0512   POCTGLUCOSE 146* 108      Cardiovascular:  No results for input(s): "TROPONINI", "CKTOTAL", "CKMB", "BNP" in the last 168 hours.    I have reviewed all pertinent lab results within the past 24 hours.    Imaging:     I have reviewed all pertinent imaging results/findings within the past 24 hours.    Micro/Path/Other:  Microbiology Results (last 7 days)       ** No results found for the last 168 hours. **           Pathology Results  (Last 7 days)      None             Problems list:  Active Problem List with Overview Notes    Diagnosis Date Noted    Delirium 04/15/2024    Hemopneumothorax on right 04/02/2024    Contusion of right lung 04/02/2024    Adrenal hemorrhage 04/02/2024    Scalp laceration 04/02/2024    Elbow laceration, left, initial encounter 04/02/2024    Acute blood loss anemia 03/29/2024    Acute hypoxemic respiratory failure 03/27/2024    Traumatic subdural hematoma (SDH) 03/27/2024    Aspiration pneumonia due to gastric secretions " 03/27/2024    Closed fracture of multiple ribs 03/27/2024    Closed nondisplaced fracture of sternal end of right clavicle 03/27/2024        Assessment & Plan:     SDH, SAH  - s/p emergent craniotomy 3/27/24  - Serial neuro q shift  - Keep HOB > 30 degrees  - Keppra completed 4/2/24  - Promote good sleep-wake cycles  - Provigil qAM  - Seroquel qHS  - Propranolol  TID     R 3-9 rib fractures, R PTX/FLIP, R pulmonary contusions  - s/p acute respiratory failure s/t trauma, resolved - extubated 3/29/24  - BiPAP at night prn; has been tolerating RA without difficulty overnight  - Cefepmine x 7 days completed 4/10/24  - Encourage frequent IS  - Good pulmonary hygiene  - Nebs q6h    R clavicle fracture  - Evaluated by Orthopedics, non-op management  - Sling for comfort  - OK for ADLs, no overhead activity  - Pendulum ROM shoulder, full ROM distally    Dysphagia  - Continue working with ST  - Modified diet - Pureed with mildly thick liquids  - Monitor blood sugar, can likely remove SSI as A1C 6.5    Auto vs Scooter  - M/Th labs  - MMPC   - pepcid and carafate, possible gastritis   - PT/OT  - SCDs & Lovenox 40 mg q12h for VTE ppx  - CM following for neuro rehab placement. Medically stable for placement once bed available.      Rehab denied by insurance after P2P. Pending appeal     ELIAZAR Baldwin-BC   Trauma/Acute Care Surgery  [facility]  C: [phone]

## 2024-04-28 LAB
ANION GAP SERPL CALC-SCNC: 6 MEQ/L
BUN SERPL-MCNC: 11.9 MG/DL (ref 8.4–25.7)
CALCIUM SERPL-MCNC: 8.3 MG/DL (ref 8.8–10)
CHLORIDE SERPL-SCNC: 104 MMOL/L (ref 98–107)
CK SERPL-CCNC: 31 U/L (ref 30–200)
CO2 SERPL-SCNC: 27 MMOL/L (ref 23–31)
CREAT SERPL-MCNC: 0.7 MG/DL (ref 0.73–1.18)
CREAT/UREA NIT SERPL: 17
GFR SERPLBLD CREATININE-BSD FMLA CKD-EPI: >60 MLS/MIN/1.73/M2
GLUCOSE SERPL-MCNC: 137 MG/DL (ref 82–115)
POTASSIUM SERPL-SCNC: 4.2 MMOL/L (ref 3.5–5.1)
SODIUM SERPL-SCNC: 137 MMOL/L (ref 136–145)

## 2024-04-28 PROCEDURE — 80048 BASIC METABOLIC PNL TOTAL CA: CPT | Performed by: NURSE PRACTITIONER

## 2024-04-28 PROCEDURE — 25000003 PHARM REV CODE 250: Performed by: NURSE PRACTITIONER

## 2024-04-28 PROCEDURE — 63600175 PHARM REV CODE 636 W HCPCS: Performed by: NURSE PRACTITIONER

## 2024-04-28 PROCEDURE — 11000001 HC ACUTE MED/SURG PRIVATE ROOM

## 2024-04-28 PROCEDURE — 25000003 PHARM REV CODE 250

## 2024-04-28 PROCEDURE — 36415 COLL VENOUS BLD VENIPUNCTURE: CPT | Performed by: NURSE PRACTITIONER

## 2024-04-28 PROCEDURE — 82550 ASSAY OF CK (CPK): CPT | Performed by: NURSE PRACTITIONER

## 2024-04-28 RX ORDER — METHOCARBAMOL 750 MG/1
750 TABLET, FILM COATED ORAL 4 TIMES DAILY
Status: DISCONTINUED | OUTPATIENT
Start: 2024-04-28 | End: 2024-04-29

## 2024-04-28 RX ADMIN — METHOCARBAMOL 750 MG: 750 TABLET ORAL at 10:04

## 2024-04-28 RX ADMIN — ENOXAPARIN SODIUM 40 MG: 40 INJECTION SUBCUTANEOUS at 10:04

## 2024-04-28 RX ADMIN — OXYCODONE HYDROCHLORIDE 5 MG: 5 TABLET ORAL at 06:04

## 2024-04-28 RX ADMIN — METHOCARBAMOL 750 MG: 750 TABLET ORAL at 04:04

## 2024-04-28 RX ADMIN — ENOXAPARIN SODIUM 40 MG: 40 INJECTION SUBCUTANEOUS at 08:04

## 2024-04-28 RX ADMIN — DOCUSATE SODIUM LIQUID 100 MG: 100 LIQUID ORAL at 08:04

## 2024-04-28 RX ADMIN — QUETIAPINE FUMARATE 25 MG: 25 TABLET ORAL at 10:04

## 2024-04-28 RX ADMIN — PROPRANOLOL HYDROCHLORIDE 20 MG: 20 TABLET ORAL at 10:04

## 2024-04-28 RX ADMIN — FAMOTIDINE 20 MG: 20 TABLET, FILM COATED ORAL at 10:04

## 2024-04-28 RX ADMIN — SUCRALFATE 1 G: 1 TABLET ORAL at 10:04

## 2024-04-28 RX ADMIN — PROPRANOLOL HYDROCHLORIDE 20 MG: 20 TABLET ORAL at 02:04

## 2024-04-28 RX ADMIN — SUCRALFATE 1 G: 1 TABLET ORAL at 11:04

## 2024-04-28 RX ADMIN — DOXAZOSIN 1 MG: 1 TABLET ORAL at 08:04

## 2024-04-28 RX ADMIN — METHOCARBAMOL 750 MG: 750 TABLET ORAL at 02:04

## 2024-04-28 RX ADMIN — POLYETHYLENE GLYCOL 3350 17 G: 17 POWDER, FOR SOLUTION ORAL at 08:04

## 2024-04-28 RX ADMIN — SUCRALFATE 1 G: 1 TABLET ORAL at 06:04

## 2024-04-28 RX ADMIN — GUAIFENESIN AND DEXTROMETHORPHAN HYDROBROMIDE 1 TABLET: 30; 600 TABLET, EXTENDED RELEASE ORAL at 08:04

## 2024-04-28 RX ADMIN — Medication 2 TABLET: at 08:04

## 2024-04-28 RX ADMIN — METHOCARBAMOL 750 MG: 750 TABLET ORAL at 08:04

## 2024-04-28 RX ADMIN — LIDOCAINE 5% 2 PATCH: 700 PATCH TOPICAL at 11:04

## 2024-04-28 RX ADMIN — PROPRANOLOL HYDROCHLORIDE 20 MG: 20 TABLET ORAL at 08:04

## 2024-04-28 RX ADMIN — THERA TABS 1 TABLET: TAB at 08:04

## 2024-04-28 RX ADMIN — GUAIFENESIN AND DEXTROMETHORPHAN HYDROBROMIDE 1 TABLET: 30; 600 TABLET, EXTENDED RELEASE ORAL at 10:04

## 2024-04-28 RX ADMIN — SUCRALFATE 1 G: 1 TABLET ORAL at 04:04

## 2024-04-28 RX ADMIN — FAMOTIDINE 20 MG: 20 TABLET, FILM COATED ORAL at 08:04

## 2024-04-28 NOTE — PROGRESS NOTES
"   Trauma Surgery   Progress Note  Admit Date: 3/26/2024  HD#33  POD#32 Days Post-Op    Subjective:   Interval history:  AF VSS.  C/o some leg pain     Home Meds:   Current Outpatient Medications   Medication Instructions    HYDROcodone-acetaminophen (NORCO) 7.5-325 mg per tablet 1 tablet, Oral, Every 6 hours PRN    olmesartan-hydrochlorothiazide (BENICAR HCT) 20-12.5 mg per tablet 1 tablet, Oral, Daily    sildenafiL (VIAGRA) 50 mg, Oral, Daily PRN      Scheduled Meds:  Current Facility-Administered Medications   Medication Dose Route Frequency    calcium-vitamin D3  2 tablet Oral Daily    dextromethorphan-guaiFENesin  mg  1 tablet Oral BID    docusate  100 mg Oral BID    doxazosin  1 mg Oral Daily    enoxparin  40 mg Subcutaneous Q12H (prophylaxis, 0900/2100)    famotidine  20 mg Oral BID    LIDOcaine  2 patch Transdermal Q24H    methocarbamoL  500 mg Oral QID    multivitamin  1 tablet Oral Daily    polyethylene glycol  17 g Oral BID    propranoloL  20 mg Oral TID    QUEtiapine  25 mg Oral QHS    sucralfate  1 g Oral QID (AC & HS)     Continuous Infusions:  Current Facility-Administered Medications   Medication Dose Route Frequency Last Rate Last Admin     PRN Meds:    Current Facility-Administered Medications:     bisacodyL, 10 mg, Rectal, Daily PRN    dextrose 10%, 12.5 g, Intravenous, PRN    dextrose 10%, 25 g, Intravenous, PRN    hydrALAZINE, 10 mg, Intravenous, Q6H PRN    labetaloL, 10 mg, Intravenous, Q6H PRN    LORazepam, 1 mg, Oral, Q6H PRN    melatonin, 6 mg, Oral, Nightly PRN    ondansetron, 4 mg, Intravenous, Q6H PRN    oxyCODONE, 5 mg, Oral, Q4H PRN       Objective:     VITAL SIGNS: 24 HR MIN & MAX LAST   Temp  Min: 98 °F (36.7 °C)  Max: 98.8 °F (37.1 °C)  98.1 °F (36.7 °C)   BP  Min: 107/63  Max: 131/71  115/64    Pulse  Min: 70  Max: 112  71    Resp  Min: 18  Max: 18  18    SpO2  Min: 95 %  Max: 99 %  98 %      HT: 5' 5.98" (167.6 cm)  WT: 55.2 kg (121 lb 11.1 oz)  BMI: 19.7 "     Intake/output:  Intake/Output - Last 3 Shifts         04/26 0700 04/27 0659 04/27 0700 04/28 0659 04/28 0700 04/29 0659    P.O. 680 795     Total Intake(mL/kg) 680 (12.3) 795 (14.4)     Urine (mL/kg/hr) 250 (0.2) 800 (0.6)     Stool  0     Total Output 250 800     Net +430 -5            Urine Occurrence 1 x 1 x     Stool Occurrence  3 x             Intake/Output Summary (Last 24 hours) at 4/28/2024 0718  Last data filed at 4/27/2024 1830  Gross per 24 hour   Intake 795 ml   Output 800 ml   Net -5 ml             Lines/drains/airway:       Peripheral IV - Single Lumen 04/08/24 2245 20 G Left;Posterior Forearm (Active)   Site Assessment Clean;Dry;Intact 04/08/24 2000   Extremity Assessment Distal to IV No abnormal discoloration 04/08/24 2000   Line Status Capped 04/08/24 2000   Dressing Status Clean;Dry;Intact 04/08/24 2000   Dressing Intervention Integrity maintained 04/08/24 2000   Number of days: 0            NG/OG Tube 03/30/24 0300 16 Fr. Right nostril (Active)   Placement Check placement verified by distal tube length measurement 04/06/24 0400   Distal Tube Length (cm) 60 04/07/24 0800   Tolerance no signs/symptoms of discomfort 04/08/24 2000   Securement secured to nostril center 04/08/24 2000   Clamp Status/Tolerance unclamped;no abdominal discomfort 04/08/24 2000   Suction Setting/Drainage Method suction at the bedside 04/08/24 2000   Insertion Site Appearance no redness, warmth, tenderness, skin breakdown, drainage 04/08/24 2000   Drainage None 04/08/24 2000   Flush/Irrigation flushed w/;water 04/08/24 2000   Feeding Type continuous;by pump 04/08/24 2000   Feeding Action feeding continued 04/08/24 2000   Current Rate (mL/hr) 55 mL/hr 04/08/24 2000   Goal Rate (mL/hr) 55 mL/hr 04/08/24 2000   Intake (mL) 474 mL 04/06/24 0525   Water Bolus (mL) 75 mL 04/08/24 2000   Rate Formula Tube Feeding (mL/hr) 55 mL/hr 04/08/24 2000   Formula Name impact peptide 1.5 04/08/24 2000   Intake (mL) - Formula Tube  "Feeding 535 04/03/24 0559   Number of days: 10       Female External Urinary Catheter w/ Suction 04/07/24 0800 (Active)   Number of days: 1       Physical examination:  Gen: NAD, AAOx2, answering questions appropriately  HEENT: Normocephalic, scalp incision C/D/I. Facial contusions healing.   CV: RRR  Resp: NWOB, R clavicle deformity  Abd: S/NT/ND  Msk: moving all extremities spontaneously and purposefully  Neuro: Oriented to self and place, follows commands  Skin/wounds: Warm, dry    Labs:  Renal:  No results for input(s): "BUN", "CREATININE" in the last 72 hours.        No results for input(s): "LACTIC" in the last 72 hours.  FEN/GI:  No results for input(s): "NA", "K", "CL", "CO2", "CALCIUM", "MG", "PHOS", "PROT", "ALBUMIN", "BILITOT", "AST", "ALKPHOS", "ALT" in the last 72 hours.        Heme:  No results for input(s): "HGB", "HCT", "PLT", "PTT", "INR" in the last 72 hours.        ID:  No results for input(s): "WBC" in the last 72 hours.        CBG:  No results for input(s): "GLUCOSE" in the last 72 hours.         No results for input(s): "POCTGLUCOSE" in the last 72 hours.     Cardiovascular:  No results for input(s): "TROPONINI", "CKTOTAL", "CKMB", "BNP" in the last 168 hours.    I have reviewed all pertinent lab results within the past 24 hours.    Imaging:     I have reviewed all pertinent imaging results/findings within the past 24 hours.    Micro/Path/Other:  Microbiology Results (last 7 days)       ** No results found for the last 168 hours. **           Pathology Results  (Last 7 days)      None             Problems list:  Active Problem List with Overview Notes    Diagnosis Date Noted    Delirium 04/15/2024    Hemopneumothorax on right 04/02/2024    Contusion of right lung 04/02/2024    Adrenal hemorrhage 04/02/2024    Scalp laceration 04/02/2024    Elbow laceration, left, initial encounter 04/02/2024    Acute blood loss anemia 03/29/2024    Acute hypoxemic respiratory failure 03/27/2024    Traumatic " subdural hematoma (SDH) 03/27/2024    Aspiration pneumonia due to gastric secretions 03/27/2024    Closed fracture of multiple ribs 03/27/2024    Closed nondisplaced fracture of sternal end of right clavicle 03/27/2024        Assessment & Plan:     SDH, SAH  - s/p emergent craniotomy 3/27/24  - Serial neuro q shift  - Keep HOB > 30 degrees  - Keppra completed 4/2/24  - Promote good sleep-wake cycles  - Provigil qAM  - Seroquel qHS  - Propranolol  TID     R 3-9 rib fractures, R PTX/FLIP, R pulmonary contusions  - s/p acute respiratory failure s/t trauma, resolved - extubated 3/29/24  - BiPAP at night prn; has been tolerating RA without difficulty overnight  - Cefepmine x 7 days completed 4/10/24  - Encourage frequent IS  - Good pulmonary hygiene  - Nebs q6h    R clavicle fracture  - Evaluated by Orthopedics, non-op management  - Sling for comfort  - OK for ADLs, no overhead activity  - Pendulum ROM shoulder, full ROM distally    Dysphagia  - Continue working with ST  - Modified diet - Pureed with mildly thick liquids  - Monitor blood sugar, can likely remove SSI as A1C 6.5    Auto vs Scooter  - M/Th labs  - MMPC - robaxin increased, consider baclofen   - pepcid and carafate, possible gastritis   - PT/OT  - SCDs & Lovenox 40 mg q12h for VTE ppx  - CM following for neuro rehab placement. Medically stable for placement once bed available.      Rehab denied by insurance after P2P. Pending appeal     SYMONE [facility]   Trauma/Acute Care Surgery  [facility]  C: [phone]

## 2024-04-29 LAB
ALBUMIN SERPL-MCNC: 2.9 G/DL (ref 3.4–4.8)
ALBUMIN/GLOB SERPL: 0.7 RATIO (ref 1.1–2)
ALP SERPL-CCNC: 279 UNIT/L (ref 40–150)
ALT SERPL-CCNC: 66 UNIT/L (ref 0–55)
AST SERPL-CCNC: 27 UNIT/L (ref 5–34)
BASOPHILS # BLD AUTO: 0.02 X10(3)/MCL
BASOPHILS NFR BLD AUTO: 0.6 %
BILIRUB SERPL-MCNC: 0.3 MG/DL
BUN SERPL-MCNC: 13.5 MG/DL (ref 8.4–25.7)
CALCIUM SERPL-MCNC: 8.8 MG/DL (ref 8.8–10)
CHLORIDE SERPL-SCNC: 103 MMOL/L (ref 98–107)
CO2 SERPL-SCNC: 29 MMOL/L (ref 23–31)
CREAT SERPL-MCNC: 0.67 MG/DL (ref 0.73–1.18)
CRP SERPL-MCNC: 10.8 MG/L
EOSINOPHIL # BLD AUTO: 0.15 X10(3)/MCL (ref 0–0.9)
EOSINOPHIL NFR BLD AUTO: 4.3 %
ERYTHROCYTE [DISTWIDTH] IN BLOOD BY AUTOMATED COUNT: 16.4 % (ref 11.5–17)
GFR SERPLBLD CREATININE-BSD FMLA CKD-EPI: >60 MLS/MIN/1.73/M2
GLOBULIN SER-MCNC: 4.1 GM/DL (ref 2.4–3.5)
GLUCOSE SERPL-MCNC: 104 MG/DL (ref 82–115)
HCT VFR BLD AUTO: 35.6 % (ref 42–52)
HGB BLD-MCNC: 10.9 G/DL (ref 14–18)
IMM GRANULOCYTES # BLD AUTO: 0.01 X10(3)/MCL (ref 0–0.04)
IMM GRANULOCYTES NFR BLD AUTO: 0.3 %
LYMPHOCYTES # BLD AUTO: 1.74 X10(3)/MCL (ref 0.6–4.6)
LYMPHOCYTES NFR BLD AUTO: 50.3 %
MAGNESIUM SERPL-MCNC: 2.1 MG/DL (ref 1.6–2.6)
MCH RBC QN AUTO: 28.8 PG (ref 27–31)
MCHC RBC AUTO-ENTMCNC: 30.6 G/DL (ref 33–36)
MCV RBC AUTO: 94.2 FL (ref 80–94)
MONOCYTES # BLD AUTO: 0.33 X10(3)/MCL (ref 0.1–1.3)
MONOCYTES NFR BLD AUTO: 9.5 %
NEUTROPHILS # BLD AUTO: 1.21 X10(3)/MCL (ref 2.1–9.2)
NEUTROPHILS NFR BLD AUTO: 35 %
NRBC BLD AUTO-RTO: 0 %
PHOSPHATE SERPL-MCNC: 2.7 MG/DL (ref 2.3–4.7)
PLATELET # BLD AUTO: 201 X10(3)/MCL (ref 130–400)
PMV BLD AUTO: 8.9 FL (ref 7.4–10.4)
POTASSIUM SERPL-SCNC: 4 MMOL/L (ref 3.5–5.1)
PREALB SERPL-MCNC: 21.1 MG/DL (ref 16–42)
PROT SERPL-MCNC: 7 GM/DL (ref 5.8–7.6)
RBC # BLD AUTO: 3.78 X10(6)/MCL (ref 4.7–6.1)
SODIUM SERPL-SCNC: 138 MMOL/L (ref 136–145)
WBC # SPEC AUTO: 3.46 X10(3)/MCL (ref 4.5–11.5)

## 2024-04-29 PROCEDURE — 84134 ASSAY OF PREALBUMIN: CPT | Performed by: NURSE PRACTITIONER

## 2024-04-29 PROCEDURE — 84100 ASSAY OF PHOSPHORUS: CPT

## 2024-04-29 PROCEDURE — 63600175 PHARM REV CODE 636 W HCPCS: Performed by: NURSE PRACTITIONER

## 2024-04-29 PROCEDURE — 94799 UNLISTED PULMONARY SVC/PX: CPT

## 2024-04-29 PROCEDURE — 25000003 PHARM REV CODE 250

## 2024-04-29 PROCEDURE — 36415 COLL VENOUS BLD VENIPUNCTURE: CPT | Performed by: NURSE PRACTITIONER

## 2024-04-29 PROCEDURE — 97129 THER IVNTJ 1ST 15 MIN: CPT

## 2024-04-29 PROCEDURE — 80053 COMPREHEN METABOLIC PANEL: CPT

## 2024-04-29 PROCEDURE — 85025 COMPLETE CBC W/AUTO DIFF WBC: CPT

## 2024-04-29 PROCEDURE — 25000003 PHARM REV CODE 250: Performed by: NURSE PRACTITIONER

## 2024-04-29 PROCEDURE — 86140 C-REACTIVE PROTEIN: CPT | Performed by: NURSE PRACTITIONER

## 2024-04-29 PROCEDURE — 92526 ORAL FUNCTION THERAPY: CPT

## 2024-04-29 PROCEDURE — 11000001 HC ACUTE MED/SURG PRIVATE ROOM

## 2024-04-29 PROCEDURE — 92507 TX SP LANG VOICE COMM INDIV: CPT

## 2024-04-29 PROCEDURE — 83735 ASSAY OF MAGNESIUM: CPT

## 2024-04-29 RX ORDER — ACETAMINOPHEN 325 MG/1
650 TABLET ORAL EVERY 6 HOURS PRN
Status: DISCONTINUED | OUTPATIENT
Start: 2024-04-29 | End: 2024-04-30 | Stop reason: HOSPADM

## 2024-04-29 RX ADMIN — LIDOCAINE 5% 2 PATCH: 700 PATCH TOPICAL at 12:04

## 2024-04-29 RX ADMIN — DOCUSATE SODIUM LIQUID 100 MG: 100 LIQUID ORAL at 09:04

## 2024-04-29 RX ADMIN — POLYETHYLENE GLYCOL 3350 17 G: 17 POWDER, FOR SOLUTION ORAL at 09:04

## 2024-04-29 RX ADMIN — PROPRANOLOL HYDROCHLORIDE 20 MG: 20 TABLET ORAL at 09:04

## 2024-04-29 RX ADMIN — POLYETHYLENE GLYCOL 3350 17 G: 17 POWDER, FOR SOLUTION ORAL at 08:04

## 2024-04-29 RX ADMIN — Medication 2 TABLET: at 08:04

## 2024-04-29 RX ADMIN — FAMOTIDINE 20 MG: 20 TABLET, FILM COATED ORAL at 09:04

## 2024-04-29 RX ADMIN — METHOCARBAMOL 750 MG: 750 TABLET ORAL at 08:04

## 2024-04-29 RX ADMIN — QUETIAPINE FUMARATE 25 MG: 25 TABLET ORAL at 09:04

## 2024-04-29 RX ADMIN — DOCUSATE SODIUM LIQUID 100 MG: 100 LIQUID ORAL at 08:04

## 2024-04-29 RX ADMIN — THERA TABS 1 TABLET: TAB at 08:04

## 2024-04-29 RX ADMIN — PROPRANOLOL HYDROCHLORIDE 20 MG: 20 TABLET ORAL at 08:04

## 2024-04-29 RX ADMIN — METHOCARBAMOL 750 MG: 750 TABLET ORAL at 12:04

## 2024-04-29 RX ADMIN — DOXAZOSIN 1 MG: 1 TABLET ORAL at 08:04

## 2024-04-29 RX ADMIN — SUCRALFATE 1 G: 1 TABLET ORAL at 09:04

## 2024-04-29 RX ADMIN — ENOXAPARIN SODIUM 40 MG: 40 INJECTION SUBCUTANEOUS at 09:04

## 2024-04-29 RX ADMIN — FAMOTIDINE 20 MG: 20 TABLET, FILM COATED ORAL at 08:04

## 2024-04-29 RX ADMIN — SUCRALFATE 1 G: 1 TABLET ORAL at 12:04

## 2024-04-29 RX ADMIN — SUCRALFATE 1 G: 1 TABLET ORAL at 06:04

## 2024-04-29 RX ADMIN — ENOXAPARIN SODIUM 40 MG: 40 INJECTION SUBCUTANEOUS at 08:04

## 2024-04-29 NOTE — PROGRESS NOTES
"   Trauma Surgery   Progress Note  Admit Date: 3/26/2024  HD#34  POD#33 Days Post-Op    Subjective:   Interval history:  AF VSS.  Labs stable. No complaints     Home Meds:   Current Outpatient Medications   Medication Instructions    HYDROcodone-acetaminophen (NORCO) 7.5-325 mg per tablet 1 tablet, Oral, Every 6 hours PRN    olmesartan-hydrochlorothiazide (BENICAR HCT) 20-12.5 mg per tablet 1 tablet, Oral, Daily    sildenafiL (VIAGRA) 50 mg, Oral, Daily PRN      Scheduled Meds:  Current Facility-Administered Medications   Medication Dose Route Frequency    calcium-vitamin D3  2 tablet Oral Daily    dextromethorphan-guaiFENesin  mg  1 tablet Oral BID    docusate  100 mg Oral BID    doxazosin  1 mg Oral Daily    enoxparin  40 mg Subcutaneous Q12H (prophylaxis, 0900/2100)    famotidine  20 mg Oral BID    LIDOcaine  2 patch Transdermal Q24H    methocarbamoL  750 mg Oral QID    multivitamin  1 tablet Oral Daily    polyethylene glycol  17 g Oral BID    propranoloL  20 mg Oral TID    QUEtiapine  25 mg Oral QHS    sucralfate  1 g Oral QID (AC & HS)     Continuous Infusions:  Current Facility-Administered Medications   Medication Dose Route Frequency Last Rate Last Admin     PRN Meds:    Current Facility-Administered Medications:     bisacodyL, 10 mg, Rectal, Daily PRN    dextrose 10%, 12.5 g, Intravenous, PRN    dextrose 10%, 25 g, Intravenous, PRN    hydrALAZINE, 10 mg, Intravenous, Q6H PRN    labetaloL, 10 mg, Intravenous, Q6H PRN    LORazepam, 1 mg, Oral, Q6H PRN    melatonin, 6 mg, Oral, Nightly PRN    ondansetron, 4 mg, Intravenous, Q6H PRN    oxyCODONE, 5 mg, Oral, Q4H PRN       Objective:     VITAL SIGNS: 24 HR MIN & MAX LAST   Temp  Min: 97.7 °F (36.5 °C)  Max: 98.4 °F (36.9 °C)  98 °F (36.7 °C)   BP  Min: 101/63  Max: 127/73  127/73    Pulse  Min: 65  Max: 89  68    Resp  Min: 18  Max: 20  18    SpO2  Min: 95 %  Max: 98 %  95 %      HT: 5' 5.98" (167.6 cm)  WT: 55.2 kg (121 lb 11.1 oz)  BMI: 19.7 "     Intake/output:  Intake/Output - Last 3 Shifts         04/27 0700 04/28 0659 04/28 0700 04/29 0659 04/29 0700 04/30 0659    P.O. 795 720     Total Intake(mL/kg) 795 (14.4) 720 (13)     Urine (mL/kg/hr) 800 (0.6) 550 (0.4)     Stool 0 0     Total Output 800 550     Net -5 +170            Urine Occurrence 1 x 0 x     Stool Occurrence 3 x 2 x             Intake/Output Summary (Last 24 hours) at 4/29/2024 0704  Last data filed at 4/29/2024 0551  Gross per 24 hour   Intake 720 ml   Output 550 ml   Net 170 ml             Lines/drains/airway:       Peripheral IV - Single Lumen 04/08/24 2245 20 G Left;Posterior Forearm (Active)   Site Assessment Clean;Dry;Intact 04/08/24 2000   Extremity Assessment Distal to IV No abnormal discoloration 04/08/24 2000   Line Status Capped 04/08/24 2000   Dressing Status Clean;Dry;Intact 04/08/24 2000   Dressing Intervention Integrity maintained 04/08/24 2000   Number of days: 0            NG/OG Tube 03/30/24 0300 16 Fr. Right nostril (Active)   Placement Check placement verified by distal tube length measurement 04/06/24 0400   Distal Tube Length (cm) 60 04/07/24 0800   Tolerance no signs/symptoms of discomfort 04/08/24 2000   Securement secured to nostril center 04/08/24 2000   Clamp Status/Tolerance unclamped;no abdominal discomfort 04/08/24 2000   Suction Setting/Drainage Method suction at the bedside 04/08/24 2000   Insertion Site Appearance no redness, warmth, tenderness, skin breakdown, drainage 04/08/24 2000   Drainage None 04/08/24 2000   Flush/Irrigation flushed w/;water 04/08/24 2000   Feeding Type continuous;by pump 04/08/24 2000   Feeding Action feeding continued 04/08/24 2000   Current Rate (mL/hr) 55 mL/hr 04/08/24 2000   Goal Rate (mL/hr) 55 mL/hr 04/08/24 2000   Intake (mL) 474 mL 04/06/24 0525   Water Bolus (mL) 75 mL 04/08/24 2000   Rate Formula Tube Feeding (mL/hr) 55 mL/hr 04/08/24 2000   Formula Name impact peptide 1.5 04/08/24 2000   Intake (mL) - Formula Tube  "Feeding 535 04/03/24 0559   Number of days: 10       Female External Urinary Catheter w/ Suction 04/07/24 0800 (Active)   Number of days: 1       Physical examination:  Gen: NAD, AAOx2, answering questions appropriately  HEENT: Normocephalic, scalp incision C/D/I. Facial contusions healing.   CV: RRR  Resp: NWOB, R clavicle deformity  Abd: S/NT/ND  Msk: moving all extremities spontaneously and purposefully  Neuro: Oriented to self and place, follows commands  Skin/wounds: Warm, dry    Labs:  Renal:  Recent Labs     04/28/24  1103 04/29/24  0412   BUN 11.9 13.5   CREATININE 0.70* 0.67*           No results for input(s): "LACTIC" in the last 72 hours.  FEN/GI:  Recent Labs     04/28/24  1103 04/29/24  0412    138   K 4.2 4.0   CO2 27 29   CALCIUM 8.3* 8.8   MG  --  2.10   PHOS  --  2.7   ALBUMIN  --  2.9*   BILITOT  --  0.3   AST  --  27   ALKPHOS  --  279*   ALT  --  66*           Heme:  Recent Labs     04/29/24  0412   HGB 10.9*   HCT 35.6*              ID:  Recent Labs     04/29/24  0412   WBC 3.46*           CBG:  Recent Labs     04/28/24  1103 04/29/24  0412   GLUCOSE 137* 104            No results for input(s): "POCTGLUCOSE" in the last 72 hours.     Cardiovascular:  No results for input(s): "TROPONINI", "CKTOTAL", "CKMB", "BNP" in the last 168 hours.    I have reviewed all pertinent lab results within the past 24 hours.    Imaging:     I have reviewed all pertinent imaging results/findings within the past 24 hours.    Micro/Path/Other:  Microbiology Results (last 7 days)       ** No results found for the last 168 hours. **           Pathology Results  (Last 7 days)      None             Problems list:  Active Problem List with Overview Notes    Diagnosis Date Noted    Delirium 04/15/2024    Hemopneumothorax on right 04/02/2024    Contusion of right lung 04/02/2024    Adrenal hemorrhage 04/02/2024    Scalp laceration 04/02/2024    Elbow laceration, left, initial encounter 04/02/2024    Acute blood " loss anemia 03/29/2024    Acute hypoxemic respiratory failure 03/27/2024    Traumatic subdural hematoma (SDH) 03/27/2024    Aspiration pneumonia due to gastric secretions 03/27/2024    Closed fracture of multiple ribs 03/27/2024    Closed nondisplaced fracture of sternal end of right clavicle 03/27/2024        Assessment & Plan:     SDH, SAH  - s/p emergent craniotomy 3/27/24  - Serial neuro q shift  - Keep HOB > 30 degrees  - Keppra completed 4/2/24  - Promote good sleep-wake cycles  - Provigil qAM  - Seroquel qHS  - Propranolol  TID     R 3-9 rib fractures, R PTX/FLIP, R pulmonary contusions  - s/p acute respiratory failure s/t trauma, resolved - extubated 3/29/24  - BiPAP at night prn; has been tolerating RA without difficulty overnight  - Cefepmine x 7 days completed 4/10/24  - Encourage frequent IS  - Good pulmonary hygiene   - dc gauf     R clavicle fracture  - Evaluated by Orthopedics, non-op management  - Sling for comfort  - OK for ADLs, no overhead activity  - Pendulum ROM shoulder, full ROM distally    Dysphagia  - Continue working with ST  - Modified diet - Pureed with mildly thick liquids  - Monitor blood sugar, can likely remove SSI as A1C 6.5    Auto vs Scooter  - M/Th labs  - MMPC - robaxin increased, consider baclofen   - pepcid and carafate, possible gastritis   - PT/OT  - SCDs & Lovenox 40 mg q12h for VTE ppx  - CM following for neuro rehab placement. Medically stable for placement once bed available.      Rehab denied by insurance after P2P. Pending appeal     SYMONE Baldwin-BC   Trauma/Acute Care Surgery  [facility]  C: [phone]

## 2024-04-29 NOTE — PLAN OF CARE
ATA spoke to Jeaneth with [facility] ([phone]). She stated the appeal is still pending, and they may get a determination tomorrow.

## 2024-04-29 NOTE — PLAN OF CARE
Called and spoke to Jeaneth with [facility] APPEAL filed Friday with insurance for rehab placement.  Per Jeaneth should receive answer tomorrow.  [facility] is aware
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
## 2024-04-29 NOTE — PT/OT/SLP PROGRESS
Physical Therapy    Missed Treatment Session     Patient Name:  Anayeli Taylor   MRN:  [id number]      Patient not seen at this time secondary to pt with refusal x 2 this date.    Will follow-up as patient is appropriate/available/agreeable to participate and as therapists' schedule allows.

## 2024-04-30 ENCOUNTER — TELEPHONE (OUTPATIENT)
Dept: NEUROSURGERY | Facility: CLINIC | Age: 74
End: 2024-04-30
Payer: MEDICARE

## 2024-04-30 VITALS
TEMPERATURE: 98 F | HEART RATE: 81 BPM | BODY MASS INDEX: 19.56 KG/M2 | DIASTOLIC BLOOD PRESSURE: 65 MMHG | SYSTOLIC BLOOD PRESSURE: 108 MMHG | RESPIRATION RATE: 18 BRPM | WEIGHT: 121.69 LBS | OXYGEN SATURATION: 97 % | HEIGHT: 66 IN

## 2024-04-30 PROCEDURE — 25000003 PHARM REV CODE 250: Performed by: NURSE PRACTITIONER

## 2024-04-30 PROCEDURE — 25000003 PHARM REV CODE 250

## 2024-04-30 PROCEDURE — 97530 THERAPEUTIC ACTIVITIES: CPT | Mod: CQ

## 2024-04-30 PROCEDURE — 63600175 PHARM REV CODE 636 W HCPCS: Performed by: NURSE PRACTITIONER

## 2024-04-30 RX ORDER — QUETIAPINE FUMARATE 25 MG/1
25 TABLET, FILM COATED ORAL NIGHTLY
Start: 2024-04-30 | End: 2025-04-30

## 2024-04-30 RX ORDER — FAMOTIDINE 20 MG/1
20 TABLET, FILM COATED ORAL 2 TIMES DAILY
Start: 2024-04-30 | End: 2025-04-30

## 2024-04-30 RX ORDER — SUCRALFATE 1 G/1
1 TABLET ORAL
Start: 2024-04-30

## 2024-04-30 RX ORDER — DOXAZOSIN 1 MG/1
1 TABLET ORAL DAILY
Start: 2024-04-30 | End: 2025-04-30

## 2024-04-30 RX ORDER — PROPRANOLOL HYDROCHLORIDE 20 MG/1
20 TABLET ORAL 3 TIMES DAILY
Start: 2024-04-30 | End: 2025-04-30

## 2024-04-30 RX ORDER — FERROUS SULFATE, DRIED 160(50) MG
2 TABLET, EXTENDED RELEASE ORAL DAILY
Start: 2024-04-30 | End: 2024-06-16

## 2024-04-30 RX ADMIN — SUCRALFATE 1 G: 1 TABLET ORAL at 11:04

## 2024-04-30 RX ADMIN — POLYETHYLENE GLYCOL 3350 17 G: 17 POWDER, FOR SOLUTION ORAL at 09:04

## 2024-04-30 RX ADMIN — SUCRALFATE 1 G: 1 TABLET ORAL at 09:04

## 2024-04-30 RX ADMIN — SUCRALFATE 1 G: 1 TABLET ORAL at 05:04

## 2024-04-30 RX ADMIN — FAMOTIDINE 20 MG: 20 TABLET, FILM COATED ORAL at 09:04

## 2024-04-30 RX ADMIN — PROPRANOLOL HYDROCHLORIDE 20 MG: 20 TABLET ORAL at 09:04

## 2024-04-30 RX ADMIN — THERA TABS 1 TABLET: TAB at 09:04

## 2024-04-30 RX ADMIN — DOCUSATE SODIUM LIQUID 100 MG: 100 LIQUID ORAL at 09:04

## 2024-04-30 RX ADMIN — ENOXAPARIN SODIUM 40 MG: 40 INJECTION SUBCUTANEOUS at 09:04

## 2024-04-30 RX ADMIN — Medication 2 TABLET: at 09:04

## 2024-04-30 NOTE — PLAN OF CARE
Pis accepted to [facility] today in [place] is Dr. Kary Dixon.  Call report to Deepa [phone].  Pt will transport per [employer].  This is the only accepting agency for this pt. Rec. Approval after ins. Denied P2P. Then Jeaneth Savage/Leonarda [phone],provided an appeal on pt's behalf , she was able to get  decision overturned and pt is approved to go today.  Notified his dgtr. Eunice of above info. [phone].  Pt will transport per [employer] secondary to safety reasons
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
## 2024-04-30 NOTE — PT/OT/SLP PROGRESS
Physical Therapy Treatment    Patient Name:  Anayeli Taylor   MRN:  [id number]    Recommendations:     Discharge therapy intensity: Moderate Intensity Therapy   Discharge Equipment Recommendations:  (TBD by next level of care)  Barriers to discharge: Decreased caregiver support and Impaired mobility    Assessment:     Anayeli Taylor is a 73 y.o. male.  He presents with the following impairments/functional limitations: weakness, impaired endurance, impaired self care skills, impaired functional mobility, gait instability, impaired balance, impaired cognition, decreased upper extremity function, decreased lower extremity function, decreased safety awareness, pain.    Rehab Prognosis: Good; patient would benefit from acute skilled PT services to address these deficits and reach maximum level of function.    Recent Surgery: Procedure(s) (LRB):  CRANIOTOMY, FOR SUBDURAL HEMATOMA EVACUATION-RIGHT (Right)  Insertion,central venous access device (Right) 34 Days Post-Op    Plan:     During this hospitalization, patient would benefit from acute PT services 5 x/week to address the identified rehab impairments via gait training, therapeutic activities, therapeutic exercises, neuromuscular re-education and progress toward the following goals:    Plan of Care Expires:  04/30/24    Subjective     Chief Complaint: pain in LE's    Objective:     Communicated with nurse prior to session.  Patient found supine with   upon PT entry to room.     General Precautions: Standard, fall  Orthopedic Precautions: RUE non weight bearing  Braces: UE Sling  Respiratory Status: Room air  Blood Pressure: 126/74  Skin Integrity: Visible skin intact      Functional Mobility:  Max assist to get EOB with patient resisting. Total assist x 2 to get to chair due to resistance. ROM to LE's to improve range to impact functional independence. Pull to stand and gait 2 steps total assist x 2 and NWB RUE.     Education Provided:  Role and goals of PT, transfer  training, bed mobility, gait training, balance training, safety awareness, assistive device, strengthening exercises, and importance of participating in PT to return to PLOF.    Patient left up in chair with call button in reach, bed alarm on, geomat cushion, and enrique pad in place    GOALS:   Multidisciplinary Problems       Physical Therapy Goals          Problem: Physical Therapy    Goal Priority Disciplines Outcome Goal Variances Interventions   Physical Therapy Goal     PT, PT/OT Progressing     Description: Goals to be met by: 5/10/24     Patient will increase functional independence with mobility by performin. Supine to sit with MInimal Assistance  2. Sit to supine with MInimal Assistance  3. Rolling to Left and Right with Minimal Assistance.  4. Sit to stand transfer with Minimal Assistance  5. Bed to chair transfer with Minimal Assistance using LRAD  6. Sitting at edge of bed x10 minutes with Stand-by Assistance  7. Ambulate 25 ft with RW & MIN A                         Time Tracking:       Billable Minutes: Therapeutic Activity 28    Treatment Type: Treatment  PT/PTA: PTA     Number of PTA visits since last PT visit: 2     2024
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
## 2024-05-01 DIAGNOSIS — S06.5X0A TRAUMATIC SUBDURAL HEMATOMA WITHOUT LOSS OF CONSCIOUSNESS, INITIAL ENCOUNTER: Primary | ICD-10-CM

## 2024-05-02 ENCOUNTER — TELEPHONE (OUTPATIENT)
Dept: NEUROSURGERY | Facility: CLINIC | Age: 74
End: 2024-05-02
Payer: MEDICARE

## 2024-05-02 NOTE — TELEPHONE ENCOUNTER
I called pt daughter to inquire about a possible d/c date from rehab on the pt. Pt daughter stated he was admitted to rehab on 4/30/24 and didn't now a date of d/c. I told patient's daughter I would give her call back next week to see about a possible d/c date. I will call pt daughter back to schedule 2 wk f/u after d/c from rehab with CT head wo contrast with an MONTY.

## 2024-05-09 ENCOUNTER — TELEPHONE (OUTPATIENT)
Dept: NEUROSURGERY | Facility: CLINIC | Age: 74
End: 2024-05-09
Payer: MEDICARE

## 2024-05-09 NOTE — TELEPHONE ENCOUNTER
I called Eva at [facility] to schedule pt hospital f/u. Patient is being discharged on 5/17/24, per  I scheduled pt 2 weeks after d/c with CT Head. Patient appointment is 6/3/24 at 8:30 with SIMRAN Rodas and I faxed CT Head order to [facility] in [place].

## 2024-05-30 ENCOUNTER — OFFICE VISIT (OUTPATIENT)
Dept: ORTHOPEDICS | Facility: CLINIC | Age: 74
End: 2024-05-30
Payer: MEDICARE

## 2024-05-30 ENCOUNTER — HOSPITAL ENCOUNTER (OUTPATIENT)
Dept: RADIOLOGY | Facility: CLINIC | Age: 74
Discharge: HOME | End: 2024-05-30
Attending: ORTHOPAEDIC SURGERY
Payer: MEDICARE

## 2024-05-30 VITALS
RESPIRATION RATE: 18 BRPM | HEART RATE: 120 BPM | HEIGHT: 65 IN | BODY MASS INDEX: 20.28 KG/M2 | DIASTOLIC BLOOD PRESSURE: 72 MMHG | SYSTOLIC BLOOD PRESSURE: 111 MMHG | WEIGHT: 121.69 LBS

## 2024-05-30 DIAGNOSIS — S42.017D CLOSED NONDISPLACED FRACTURE OF STERNAL END OF RIGHT CLAVICLE WITH ROUTINE HEALING, SUBSEQUENT ENCOUNTER: Primary | ICD-10-CM

## 2024-05-30 DIAGNOSIS — S42.017D CLOSED NONDISPLACED FRACTURE OF STERNAL END OF RIGHT CLAVICLE WITH ROUTINE HEALING, SUBSEQUENT ENCOUNTER: ICD-10-CM

## 2024-05-30 PROCEDURE — 3044F HG A1C LEVEL LT 7.0%: CPT | Mod: CPTII,,, | Performed by: ORTHOPAEDIC SURGERY

## 2024-05-30 PROCEDURE — 3074F SYST BP LT 130 MM HG: CPT | Mod: CPTII,,, | Performed by: ORTHOPAEDIC SURGERY

## 2024-05-30 PROCEDURE — 99024 POSTOP FOLLOW-UP VISIT: CPT | Mod: ,,, | Performed by: ORTHOPAEDIC SURGERY

## 2024-05-30 PROCEDURE — 73000 X-RAY EXAM OF COLLAR BONE: CPT | Mod: RT,,, | Performed by: ORTHOPAEDIC SURGERY

## 2024-05-30 PROCEDURE — 3078F DIAST BP <80 MM HG: CPT | Mod: CPTII,,, | Performed by: ORTHOPAEDIC SURGERY

## 2024-05-30 PROCEDURE — 1160F RVW MEDS BY RX/DR IN RCRD: CPT | Mod: CPTII,,, | Performed by: ORTHOPAEDIC SURGERY

## 2024-05-30 PROCEDURE — 1159F MED LIST DOCD IN RCRD: CPT | Mod: CPTII,,, | Performed by: ORTHOPAEDIC SURGERY

## 2024-05-30 RX ORDER — VITAMIN A, VITAMIN C, VITAMIN D, VITAMIN E, THIAMINE, RIBOFLAVIN, NIACIN, VITAMIN B6, FOLIC ACID, VITAMIN B12, CALCIUM, IRON, ZINC, COPPER 4000; 120; 400; 22; 1.84; 3; 20; 10; 1; 12; 200; 27; 25; 2 [IU]/1; MG/1; [IU]/1; [IU]/1; MG/1; MG/1; MG/1; MG/1; MG/1; UG/1; MG/1; MG/1; MG/1; MG/1
1 TABLET ORAL
COMMUNITY
Start: 2024-05-16 | End: 2024-06-16

## 2024-05-30 RX ORDER — HYDROCODONE BITARTRATE AND ACETAMINOPHEN 10; 325 MG/1; MG/1
1 TABLET ORAL EVERY 8 HOURS PRN
COMMUNITY
Start: 2024-05-29

## 2024-05-30 RX ORDER — BACLOFEN 10 MG/1
20 TABLET ORAL 3 TIMES DAILY PRN
COMMUNITY
Start: 2024-05-16

## 2024-05-30 RX ORDER — OLMESARTAN MEDOXOMIL AND HYDROCHLOROTHIAZIDE 20/12.5 20; 12.5 MG/1; MG/1
1 TABLET ORAL EVERY MORNING
COMMUNITY
Start: 2024-05-16
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
## 2024-06-03 ENCOUNTER — OFFICE VISIT (OUTPATIENT)
Dept: NEUROSURGERY | Facility: CLINIC | Age: 74
End: 2024-06-03
Payer: MEDICARE

## 2024-06-03 VITALS
HEART RATE: 101 BPM | DIASTOLIC BLOOD PRESSURE: 70 MMHG | BODY MASS INDEX: 26.66 KG/M2 | WEIGHT: 160 LBS | RESPIRATION RATE: 16 BRPM | SYSTOLIC BLOOD PRESSURE: 116 MMHG | HEIGHT: 65 IN

## 2024-06-03 DIAGNOSIS — S06.5X0A TRAUMATIC SUBDURAL HEMATOMA WITHOUT LOSS OF CONSCIOUSNESS, INITIAL ENCOUNTER: Primary | ICD-10-CM

## 2024-06-03 PROCEDURE — 3074F SYST BP LT 130 MM HG: CPT | Mod: CPTII,,, | Performed by: PHYSICIAN ASSISTANT

## 2024-06-03 PROCEDURE — 1160F RVW MEDS BY RX/DR IN RCRD: CPT | Mod: CPTII,,, | Performed by: PHYSICIAN ASSISTANT

## 2024-06-03 PROCEDURE — 3288F FALL RISK ASSESSMENT DOCD: CPT | Mod: CPTII,,, | Performed by: PHYSICIAN ASSISTANT

## 2024-06-03 PROCEDURE — 3078F DIAST BP <80 MM HG: CPT | Mod: CPTII,,, | Performed by: PHYSICIAN ASSISTANT

## 2024-06-03 PROCEDURE — 1101F PT FALLS ASSESS-DOCD LE1/YR: CPT | Mod: CPTII,,, | Performed by: PHYSICIAN ASSISTANT

## 2024-06-03 PROCEDURE — 99024 POSTOP FOLLOW-UP VISIT: CPT | Mod: ,,, | Performed by: PHYSICIAN ASSISTANT

## 2024-06-03 PROCEDURE — 3044F HG A1C LEVEL LT 7.0%: CPT | Mod: CPTII,,, | Performed by: PHYSICIAN ASSISTANT

## 2024-06-03 PROCEDURE — 1126F AMNT PAIN NOTED NONE PRSNT: CPT | Mod: CPTII,,, | Performed by: PHYSICIAN ASSISTANT

## 2024-06-03 PROCEDURE — 1159F MED LIST DOCD IN RCRD: CPT | Mod: CPTII,,, | Performed by: PHYSICIAN ASSISTANT

## 2024-06-03 RX ORDER — NAPROXEN SODIUM 220 MG
220 TABLET ORAL 2 TIMES DAILY WITH MEALS
COMMUNITY
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
## 2024-06-19 ENCOUNTER — TELEPHONE (OUTPATIENT)
Dept: NEUROSURGERY | Facility: CLINIC | Age: 74
End: 2024-06-19
Payer: MEDICARE

## 2024-06-19 NOTE — TELEPHONE ENCOUNTER
----- Message from Adrianna Guevara PA-C sent at 6/17/2024 12:25 PM CDT -----  Regarding: RE: CT head  Please send a letter in mail requesting they call our office to scheduled CT and follow up.  Or I can call them with results.  ----- Message -----  From: Courtney Bass MA  Sent: 6/17/2024   9:59 AM CDT  To: Adrianna Guevara PA-C  Subject: RE: CT head                                      I've called [facility] to see if pt was scheduled and they stated that they've tried all the pt's numbers with no success. I also tried the numbers in pt's chart to notify them that [facility] [place] been trying to schedule pt for his CT, but I've also had no success the 4x[facility] I've tried.  ----- Message -----  From: Adrianna Guevara PA-C  Sent: 6/16/2024   6:35 PM CDT  To: Courtney Bass MA  Subject: CT head                                          Any luck on getting a new head CT on this patient?

## 2024-07-01 PROBLEM — J96.01 ACUTE HYPOXEMIC RESPIRATORY FAILURE: Status: RESOLVED | Noted: 2024-03-27 | Resolved: 2024-07-01

## 2024-07-01 PROBLEM — J69.0 ASPIRATION PNEUMONIA DUE TO GASTRIC SECRETIONS: Status: RESOLVED | Noted: 2024-03-27 | Resolved: 2024-07-01

## 2024-07-18 ENCOUNTER — OUTSIDE PLACE OF SERVICE (OUTPATIENT)
Dept: FAMILY MEDICINE | Facility: CLINIC | Age: 74
End: 2024-07-18
Payer: MEDICARE

## 2024-07-18 PROCEDURE — 99223 1ST HOSP IP/OBS HIGH 75: CPT | Mod: ,,, | Performed by: FAMILY MEDICINE

## (undated) DEVICE — SPONGE SURGIFOAM 100 8.5X12X10

## (undated) DEVICE — TUBE SUCTION MEDI-VAC STERILE

## (undated) DEVICE — STAPLER SKIN PROXIMATE WIDE

## (undated) DEVICE — COVER HD BACK TABLE 6FT

## (undated) DEVICE — SPONGE GAUZE 16PLY 4X4

## (undated) DEVICE — ROUTER TAPERED 2.3MM

## (undated) DEVICE — SUT 4-0 NEUROLON RB1 TAPER

## (undated) DEVICE — GLOVE PROTEXIS BLUE LATEX 7.5

## (undated) DEVICE — POSITIONER HEAD ADULT

## (undated) DEVICE — Device

## (undated) DEVICE — PERFORATOR SURG CRAN 14X11MM

## (undated) DEVICE — KIT DRAIN WOUND RND SPRNG RESV

## (undated) DEVICE — HEMOSTAT SURGICEL FIBRLR 2X4IN

## (undated) DEVICE — MARKER WRITESITE SKIN CHLRAPRP

## (undated) DEVICE — ELECTRODE BLADE INSULATED 1 IN

## (undated) DEVICE — BANDAGE GAUZE COT STRL 4.5X4.1

## (undated) DEVICE — KIT ARROWG+ARD PRSS INJ 3 LMN

## (undated) DEVICE — GLOVE PROTEXIS PI SYN SURG 7.5

## (undated) DEVICE — GLOVE PROTEXIS PI SYN SURG 7

## (undated) DEVICE — POSITIONER HEEL FOAM CONVOLTD

## (undated) DEVICE — SOL NACL IRR 1000ML BTL

## (undated) DEVICE — SUT VICRYL 2-0 8-18 CP-2

## (undated) DEVICE — DRESSING TELFA N ADH 3X8

## (undated) DEVICE — DRESSING XEROFORM NONADH 1X8IN

## (undated) DEVICE — ELECTRODE PATIENT RETURN DISP

## (undated) DEVICE — SPONGE COTTON TRAY 4X4IN

## (undated) DEVICE — GLOVE PROTEXIS BLUE LATEX 8

## (undated) DEVICE — HOOK LONE STAR RETRCT 12MM

## (undated) DEVICE — NDL HYPO 22GX1 1/2 SYR 10ML LL

## (undated) DEVICE — GOWN X-LG STERILE BACK

## (undated) DEVICE — SPONGE LAP 18X18 PREWASHED

## (undated) DEVICE — KIT SURGICAL TURNOVER

## (undated) DEVICE — ROUTER STRAIGHT RED 1.1MM

## (undated) DEVICE — SEE MEDLINE ITEM 157103

## (undated) DEVICE — ZOR AXS BATTERY PACK

## (undated) DEVICE — CLIPS RANEY SCALP FFS/ASSY

## (undated) DEVICE — BANDAGE BULKEE LITE 3INX4.1YD

## (undated) DEVICE — DISH PETRI MED 3.5IN